# Patient Record
Sex: MALE | Race: WHITE | Employment: UNEMPLOYED | URBAN - METROPOLITAN AREA
[De-identification: names, ages, dates, MRNs, and addresses within clinical notes are randomized per-mention and may not be internally consistent; named-entity substitution may affect disease eponyms.]

---

## 2017-10-23 ENCOUNTER — HOSPITAL ENCOUNTER (OUTPATIENT)
Dept: OTHER | Age: 52
Discharge: HOME | End: 2017-10-23
Attending: INTERNAL MEDICINE | Admitting: INTERNAL MEDICINE

## 2017-10-23 DIAGNOSIS — R52 PAIN: ICD-10-CM

## 2019-04-04 ENCOUNTER — HOSPITAL ENCOUNTER (OUTPATIENT)
Dept: GENERAL RADIOLOGY | Age: 54
Discharge: HOME | End: 2019-04-04
Payer: COMMERCIAL

## 2019-04-04 ENCOUNTER — HOSPITAL ENCOUNTER (OUTPATIENT)
Age: 54
Discharge: HOME | End: 2019-04-04
Payer: COMMERCIAL

## 2019-04-04 DIAGNOSIS — R05.9 COUGH: ICD-10-CM

## 2019-04-04 PROCEDURE — 71046 X-RAY EXAM CHEST 2 VIEWS: CPT

## 2020-05-22 ENCOUNTER — HOSPITAL ENCOUNTER (OUTPATIENT)
Age: 55
Discharge: HOME | End: 2020-05-22
Payer: COMMERCIAL

## 2020-05-22 ENCOUNTER — HOSPITAL ENCOUNTER (OUTPATIENT)
Dept: GENERAL RADIOLOGY | Age: 55
Discharge: HOME | End: 2020-05-22
Payer: COMMERCIAL

## 2020-05-22 PROCEDURE — 72040 X-RAY EXAM NECK SPINE 2-3 VW: CPT

## 2020-05-22 PROCEDURE — 73030 X-RAY EXAM OF SHOULDER: CPT

## 2022-05-23 ENCOUNTER — HOSPITAL ENCOUNTER (OUTPATIENT)
Dept: GENERAL RADIOLOGY | Age: 57
Discharge: HOME | End: 2022-05-23
Payer: COMMERCIAL

## 2022-05-23 ENCOUNTER — HOSPITAL ENCOUNTER (OUTPATIENT)
Age: 57
Discharge: HOME | End: 2022-05-23
Payer: COMMERCIAL

## 2022-05-23 DIAGNOSIS — M54.50 LOW BACK PAIN, UNSPECIFIED BACK PAIN LATERALITY, UNSPECIFIED CHRONICITY, UNSPECIFIED WHETHER SCIATICA PRESENT: ICD-10-CM

## 2022-05-23 PROCEDURE — 72100 X-RAY EXAM L-S SPINE 2/3 VWS: CPT

## 2022-06-07 ENCOUNTER — HOSPITAL ENCOUNTER (OUTPATIENT)
Dept: CT IMAGING | Age: 57
Discharge: HOME | End: 2022-06-07
Payer: COMMERCIAL

## 2022-06-07 DIAGNOSIS — I71.40 ABDOMINAL AORTIC ANEURYSM WITHOUT RUPTURE: ICD-10-CM

## 2022-06-07 PROCEDURE — 6360000004 HC RX CONTRAST MEDICATION: Performed by: INTERNAL MEDICINE

## 2022-06-07 PROCEDURE — 74174 CTA ABD&PLVS W/CONTRAST: CPT

## 2022-06-07 RX ADMIN — IOPAMIDOL 75 ML: 755 INJECTION, SOLUTION INTRAVENOUS at 08:47

## 2022-07-25 PROBLEM — I65.29 CAROTID ARTERY STENOSIS: Status: ACTIVE | Noted: 2020-06-30

## 2022-07-25 PROBLEM — I71.43 ANEURYSM OF INFRARENAL ABDOMINAL AORTA (HCC): Status: ACTIVE | Noted: 2022-06-24

## 2022-07-25 PROBLEM — M19.019 PRIMARY LOCALIZED OSTEOARTHROSIS OF SHOULDER REGION: Status: ACTIVE | Noted: 2017-11-27

## 2022-07-25 PROBLEM — N52.9 MALE ERECTILE DISORDER: Status: ACTIVE | Noted: 2019-06-28

## 2022-07-25 RX ORDER — GEMFIBROZIL 600 MG/1
600 TABLET, FILM COATED ORAL
COMMUNITY

## 2022-07-25 RX ORDER — TAMSULOSIN HYDROCHLORIDE 0.4 MG/1
0.4 CAPSULE ORAL DAILY
COMMUNITY

## 2022-07-25 RX ORDER — METRONIDAZOLE 250 MG/1
250 TABLET ORAL 3 TIMES DAILY
COMMUNITY

## 2022-07-25 RX ORDER — LISINOPRIL AND HYDROCHLOROTHIAZIDE 20; 12.5 MG/1; MG/1
TABLET ORAL
COMMUNITY
Start: 2022-05-02

## 2022-07-25 RX ORDER — NAPROXEN 500 MG/1
500 TABLET ORAL 2 TIMES DAILY WITH MEALS
COMMUNITY

## 2022-07-25 RX ORDER — AMLODIPINE BESYLATE 10 MG/1
TABLET ORAL
COMMUNITY
Start: 2022-05-26

## 2022-07-25 RX ORDER — MIRTAZAPINE 45 MG/1
45 TABLET, ORALLY DISINTEGRATING ORAL NIGHTLY
COMMUNITY

## 2022-07-25 RX ORDER — TRAZODONE HYDROCHLORIDE 50 MG/1
50 TABLET ORAL NIGHTLY
COMMUNITY

## 2022-07-25 RX ORDER — CYCLOBENZAPRINE HCL 5 MG
TABLET ORAL
COMMUNITY
Start: 2022-04-22

## 2022-07-25 RX ORDER — ATORVASTATIN CALCIUM 40 MG/1
TABLET, FILM COATED ORAL
COMMUNITY
Start: 2022-06-24

## 2022-07-25 RX ORDER — SILDENAFIL 100 MG/1
100 TABLET, FILM COATED ORAL PRN
COMMUNITY

## 2022-07-25 RX ORDER — LEVOFLOXACIN 500 MG/1
500 TABLET, FILM COATED ORAL DAILY
COMMUNITY

## 2022-07-26 ENCOUNTER — OFFICE VISIT (OUTPATIENT)
Dept: VASCULAR SURGERY | Age: 57
End: 2022-07-26
Payer: COMMERCIAL

## 2022-07-26 VITALS
SYSTOLIC BLOOD PRESSURE: 126 MMHG | DIASTOLIC BLOOD PRESSURE: 78 MMHG | WEIGHT: 207 LBS | HEIGHT: 69 IN | BODY MASS INDEX: 30.66 KG/M2

## 2022-07-26 DIAGNOSIS — I71.40 AAA (ABDOMINAL AORTIC ANEURYSM) WITHOUT RUPTURE: Primary | ICD-10-CM

## 2022-07-26 PROCEDURE — G8427 DOCREV CUR MEDS BY ELIG CLIN: HCPCS | Performed by: SURGERY

## 2022-07-26 PROCEDURE — G8417 CALC BMI ABV UP PARAM F/U: HCPCS | Performed by: SURGERY

## 2022-07-26 PROCEDURE — 4004F PT TOBACCO SCREEN RCVD TLK: CPT | Performed by: SURGERY

## 2022-07-26 PROCEDURE — 99203 OFFICE O/P NEW LOW 30 MIN: CPT | Performed by: SURGERY

## 2022-07-26 PROCEDURE — 3017F COLORECTAL CA SCREEN DOC REV: CPT | Performed by: SURGERY

## 2022-07-26 NOTE — PROGRESS NOTES
[facility]  Consultation Note    Chief Complaint / Reason for Consultation  AAA    History of Present Illness  Patient is a 60 y.o. male with history of hypertension, COPD presents today in referral for an infrarenal abdominal aortic aneurysm. Patient experienced some low back pain and x-ray was performed suggesting a infrarenal abdominal aortic aneurysm. Subsequent CT scan was performed showing a 4.9 cm aneurysm. As a result he is referred to vascular surgery for further evaluation. He denies any family history of aneurysmal disease. The patient does currently smoke and has a long history of tobacco abuse    Review of Systems     Denies fevers, chills, chest pain, shortness of breath, nausea, vomiting, hematemesis, diarrhea, constipation, melena, hematochezia, wt changes, vision problems, blindness, hearing problems, facial droop, slurred speech, extremity weakness, extremity numbness, dysuria. Past Medical History:   Diagnosis Date    Anxiety     [facility] DR EWING [phone], PANIC DISORDER    Bulging disc     LUMBAR    COPD (chronic obstructive pulmonary disease) ([facility])     EMPHYSEMA    Dependent personality disorder     PER PSYCH. HISTORY    Enlarged prostate     Hypertension     Leukocytosis     MACROCYTOSIS    MDD (major depressive disorder)     discharged from [facility] psych unit 10-5-12.     Neuropathy     Weight loss 11-6-12    30 LBS IN LAST YEAR R/T DEPRESSION       Past Surgical History:   Procedure Laterality Date    COLONOSCOPY      CYSTOSCOPY  2/27/2014    HERNIA REPAIR      BILAT GROIN    INGUINAL HERNIA REPAIR  11/14/12    REPAIR RECURRENT WITH MESH    UMBILICAL HERNIA REPAIR  3/7/14    with mesh    UPPER GASTROINTESTINAL ENDOSCOPY         Allergies   Allergen Reactions    Nsaids      IBS    Prednisone      Does not remember    Pcn [Penicillins] Rash       Social History     Socioeconomic History    Marital status: Single     Spouse name: Not on file  06/10/2021 10:17 AM    K 4.3 06/10/2021 10:17 AM     06/10/2021 10:17 AM    CO2 22 06/10/2021 10:17 AM    BUN 19 06/10/2021 10:17 AM    CREATININE 1.1 06/10/2021 10:17 AM      No results found for: GLU    Imaging:     CT scan reviewed and shows a 4.8 cm infrarenal aortic aneurysm with atherosclerosis into the iliac arteries with stenosis of the right common iliac artery    Assessment:   Asymptomatic 4.9 cm infrarenal aortic aneurysm  Tobacco abuse      Plan:  1. AAA (abdominal aortic aneurysm) without rupture (HCC)  4.9 cm asymptomatic infrarenal abdominal aortic aneurysm. Discussed with him and his sister the natural history of aneurysmal disease. At this point would recommend routine surveillance imaging with repeat CT scan in 6 months. Discussed with him the importance of smoking cessation. All questions answered. - CTA ABDOMEN PELVIS W WO CONTRAST; Future          Salomón Tripathi M.D., FACS.  7/26/2022  7:59 AM

## 2022-07-26 NOTE — LETTER
[facility]) - Vascular and Endovascular Surgeons  [facility] [address] [place] [address]  Phone: [phone]  Fax: [phone]           Michael Guevara MD      July 26, 2022     Patient: Kasey Ybarra   MR Number: [id number]   YOB: 1965   Date of Visit: 7/26/2022       Dear Dr. Stefano Webber:    Thank you for referring Lionel Worthy to me for evaluation/treatment. Below are the relevant portions of my assessment and plan of care. If you have questions, please do not hesitate to call me. I look forward to following Laura House along with you.     Sincerely,        Michael Guevara MD    CC providers:  Felton Cagle MD  [address], [facility] [address] [address]

## 2023-01-27 ENCOUNTER — HOSPITAL ENCOUNTER (OUTPATIENT)
Dept: CT IMAGING | Age: 58
Discharge: HOME | End: 2023-01-27
Payer: COMMERCIAL

## 2023-01-27 DIAGNOSIS — I71.40 AAA (ABDOMINAL AORTIC ANEURYSM) WITHOUT RUPTURE: ICD-10-CM

## 2023-01-27 LAB
CREAT SERPL-MCNC: 0.9 MG/DL (ref 0.9–1.3)
GFR SERPL CREATININE-BSD FRML MDRD: >60 ML/MIN/{1.73_M2}

## 2023-01-27 PROCEDURE — 36415 COLL VENOUS BLD VENIPUNCTURE: CPT

## 2023-01-27 PROCEDURE — 82565 ASSAY OF CREATININE: CPT

## 2023-01-27 PROCEDURE — 74174 CTA ABD&PLVS W/CONTRAST: CPT

## 2023-01-27 PROCEDURE — 6360000004 HC RX CONTRAST MEDICATION: Performed by: SURGERY

## 2023-01-27 RX ADMIN — IOPAMIDOL 75 ML: 755 INJECTION, SOLUTION INTRAVENOUS at 09:18

## 2023-01-30 ENCOUNTER — OFFICE VISIT (OUTPATIENT)
Dept: VASCULAR SURGERY | Age: 58
End: 2023-01-30
Payer: COMMERCIAL

## 2023-01-30 VITALS
HEIGHT: 69 IN | DIASTOLIC BLOOD PRESSURE: 82 MMHG | WEIGHT: 200 LBS | SYSTOLIC BLOOD PRESSURE: 138 MMHG | BODY MASS INDEX: 29.62 KG/M2

## 2023-01-30 DIAGNOSIS — I71.43 INFRARENAL ABDOMINAL AORTIC ANEURYSM (AAA) WITHOUT RUPTURE: Primary | ICD-10-CM

## 2023-01-30 PROCEDURE — G8427 DOCREV CUR MEDS BY ELIG CLIN: HCPCS | Performed by: SURGERY

## 2023-01-30 PROCEDURE — 3017F COLORECTAL CA SCREEN DOC REV: CPT | Performed by: SURGERY

## 2023-01-30 PROCEDURE — 4004F PT TOBACCO SCREEN RCVD TLK: CPT | Performed by: SURGERY

## 2023-01-30 PROCEDURE — 3075F SYST BP GE 130 - 139MM HG: CPT | Performed by: SURGERY

## 2023-01-30 PROCEDURE — 3079F DIAST BP 80-89 MM HG: CPT | Performed by: SURGERY

## 2023-01-30 PROCEDURE — G8417 CALC BMI ABV UP PARAM F/U: HCPCS | Performed by: SURGERY

## 2023-01-30 PROCEDURE — 99213 OFFICE O/P EST LOW 20 MIN: CPT | Performed by: SURGERY

## 2023-01-30 PROCEDURE — G8484 FLU IMMUNIZE NO ADMIN: HCPCS | Performed by: SURGERY

## 2023-01-30 NOTE — PROGRESS NOTES
[facility])   Vascular Surgery Followup    Referring Provider:  Ulisses Patino MD     Chief Complaint   Patient presents with    Follow-up        History of Present Illness:  61-year-old male here today for follow-up after undergoing CT scan surveillance of an infrarenal abdominal aortic aneurysm. He denies any complaints. Denies abdominal or back pain    Past Medical History:   has a past medical history of Anxiety, Bulging disc, COPD (chronic obstructive pulmonary disease) ([address] Dependent personality disorder ([address] Enlarged prostate, Hypertension, Leukocytosis, MDD (major depressive disorder), Neuropathy, and Weight loss. Surgical History:   has a past surgical history that includes hernia repair; Colonoscopy; Upper gastrointestinal endoscopy; Inguinal hernia repair (11/14/12); Umbilical hernia repair (3/7/14); and Cystocopy (2/27/2014). Social History:   reports that he has been smoking cigarettes. He has a 15.00 pack-year smoking history. He has never used smokeless tobacco. He reports current alcohol use of about 2.0 standard drinks per week. He reports that he does not use drugs. Family History:  family history includes COPD in his father and mother; Cancer in his father; Dementia in his mother; Heart Disease in his father; High Blood Pressure in his father.      Home Medications:  Current Outpatient Medications   Medication Sig Dispense Refill    amLODIPine (NORVASC) 10 MG tablet TAKE 1 TABLET BY MOUTH EVERY DAY      cyclobenzaprine (FLEXERIL) 5 MG tablet TAKE 1 TABLET BY MOUTH THREE TIMES A DAY      atorvastatin (LIPITOR) 40 MG tablet Atorvastatin Calcium 40 MG Oral Tablet QTY: 90 tablet Days: 90 Refills: 0  Written: 06/24/22 Patient Instructions: TAKE 1 TABLET BY MOUTH EVERY DAY      lisinopril-hydroCHLOROthiazide (PRINZIDE;ZESTORETIC) 20-12.5 MG per tablet TAKE 1 TABLET BY MOUTH EVERY DAY      metroNIDAZOLE (FLAGYL) 250 MG tablet Take 250 mg by mouth in the morning and 250 mg at noon and 250 mg before bedtime. gemfibrozil (LOPID) 600 MG tablet Take 600 mg by mouth in the morning and 600 mg in the evening. Take before meals. HYDROCODONE-ACETAMINOPHEN 5-500 MG PO TABS, STARTER PACK, Take by mouth.      levoFLOXacin (LEVAQUIN) 500 MG tablet Take 500 mg by mouth in the morning. naproxen (NAPROSYN) 500 MG tablet Take 500 mg by mouth in the morning and 500 mg in the evening. Take with meals. mirtazapine (REMERON SOLTAB) 45 MG disintegrating tablet Take 45 mg by mouth nightly      tamsulosin (FLOMAX) 0.4 MG capsule Take 0.4 mg by mouth in the morning. traZODone (DESYREL) 50 MG tablet Take 50 mg by mouth nightly      sildenafil (VIAGRA) 100 MG tablet Take 100 mg by mouth as needed for Erectile Dysfunction      cyclobenzaprine (FLEXERIL) 10 MG tablet Take 1 tablet by mouth 3 times daily as needed for Muscle spasms 15 tablet 0    Cholecalciferol (VITAMIN D3) 5000 UNITS TABS Take  by mouth daily. amLODIPine (NORVASC) 5 MG tablet Take 5 mg by mouth daily. albuterol (PROVENTIL HFA;VENTOLIN HFA) 108 (90 BASE) MCG/ACT inhaler Inhale 2 puffs into the lungs every 6 hours as needed. clonazePAM (KLONOPIN) 1 MG tablet Take 1 mg by mouth 3 times daily. No current facility-administered medications for this visit. Allergies:  Nsaids, Prednisone, and Pcn [penicillins]     Review of Systems:   Constitutional: there has been no unanticipated weight loss. There's been no change in energy level, sleep pattern, or activity level. Eyes: No visual changes or diplopia. No scleral icterus. ENT: No Headaches, hearing loss or vertigo. No mouth sores or sore throat. Cardiovascular: Reviewed in HPI  Respiratory: No cough or wheezing, no sputum production. No hematemesis. Gastrointestinal: No abdominal pain, appetite loss, blood in stools. No change in bowel or bladder habits. Genitourinary: No dysuria, trouble voiding, or hematuria.   Musculoskeletal:  No gait disturbance, weakness or joint complaints. Integumentary: No rash or pruritis. Neurological: No headache, diplopia, change in muscle strength, numbness or tingling. No change in gait, balance, coordination, mood, affect, memory, mentation, behavior. Psychiatric: No anxiety, no depression. Endocrine: No malaise, fatigue or temperature intolerance. No excessive thirst, fluid intake, or urination. No tremor. Hematologic/Lymphatic: No abnormal bruising or bleeding, blood clots or swollen lymph nodes. Allergic/Immunologic: No nasal congestion or hives. Physical Examination:    Vitals:    01/30/23 0902   BP: 138/82          General appearance: alert, appears stated age, cooperative, and no distress  Head: Normocephalic, without obvious abnormality, atraumatic  Neck: no adenopathy, no carotid bruit, no JVD, supple, symmetrical, trachea midline, and thyroid: not enlarged, symmetric, no tenderness/mass/nodules  Lungs: clear to auscultation bilaterally  Heart: regular rate and rhythm, S1, S2 normal, no murmur, click, rub or gallop  Abdomen: soft, non-tender. Bowel sounds normal. No masses,  no organomegaly  Extremities: extremities normal, atraumatic, no cyanosis or edema    Neurologic: Grossly normal    MEDICAL DECISION MAKING/TESTING  I have reviewed the testing personally and my interpretation is below. 1. Infrarenal abdominal aortic aneurysm with mild increase in size, currently  measuring 5.1 x 5.1 cm. Previously was 4.9 x 4.9 cm.  2. Unchanged, severe atherosclerotic changes throughout abdominal aorta with  intramural thrombus. 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 3. Unchanged, 70% stenosis at the origin of right common iliac artery. Mildly dilated right common iliac artery at 1.8 cm.  4. Unchanged nonobstructing calculus inferior pole left kidney. Simple  stable bilateral renal cysts. 5. Unchanged colonic diverticulosis.   6. Unchanged incompletely distended urinary bladder versus urinary bladder  wall prominence due to chronic obstructive uropathy from mildly enlarged  prostate.    Assessment:     Patient Active Problem List   Diagnosis    DDD (degenerative disc disease), cervical    Displacement of cervical intervertebral disc without myelopathy    Aneurysm of infrarenal abdominal aorta    Anxiety disorder    Benign essential hypertension    Benign prostatic hyperplasia with lower urinary tract symptoms    Carotid artery stenosis    Chronic obstructive pulmonary disease (HCC)    Current smoker    Degeneration of lumbar or lumbosacral intervertebral disc    Depression    Diverticular disease    Esophageal reflux    Male erectile disorder    Neck sprain    Nicotine dependence    Other and unspecified hyperlipidemia    Primary localized osteoarthrosis of shoulder region    Recurrent major depression-severe (HCC)    Suicidal ideation       Plan:  57-year-old male with a slight interval enlargement of an infrarenal abdominal aortic aneurysm now measuring 5 cm.  I discussed with him the possibility of repair versus continued close surveillance with repeat CT in 6 months.  He would like to proceed with repeat CT scan in 6 months.  I did discuss with him and his sister today that if the aneurysm grew again on the next CT scan would recommend repair.  All questions answered.    Thank you for allowing me to participate in the care of this individual.  Please do not hesitate to contact me with any questions.    Salomón Hinojosa M.D., FACS.  1/30/2023  9:08 AM

## 2023-01-30 NOTE — LETTER
[facility]) - Vascular and Endovascular Surgeons  [facility] [address] [place] [address]  Phone: [phone]  Fax: [phone]           Kannan Francis MD      January 30, 2023     Patient: Tracee Sher   MR Number: [id number]   YOB: 1965   Date of Visit: 1/30/2023       Dear Dr. Joo Cruz:    Thank you for referring Danial Walters to me for evaluation/treatment. Below are the relevant portions of my assessment and plan of care. If you have questions, please do not hesitate to call me. I look forward to following [place] Humbles along with you.     Sincerely,        Kannan Francis MD    CC providers:  Moses Em MD  [address] [place] [address] [address] [place] [address] [address]

## 2023-08-04 ENCOUNTER — HOSPITAL ENCOUNTER (OUTPATIENT)
Dept: CT IMAGING | Age: 58
Discharge: HOME | End: 2023-08-04
Payer: COMMERCIAL

## 2023-08-04 DIAGNOSIS — I71.43 INFRARENAL ABDOMINAL AORTIC ANEURYSM (AAA) WITHOUT RUPTURE (HCC): ICD-10-CM

## 2023-08-04 LAB
CREAT SERPL-MCNC: 1.2 MG/DL (ref 0.9–1.3)
GFR SERPLBLD CREATININE-BSD FMLA CKD-EPI: >60 ML/MIN/{1.73_M2}

## 2023-08-04 PROCEDURE — 36415 COLL VENOUS BLD VENIPUNCTURE: CPT

## 2023-08-04 PROCEDURE — 82565 ASSAY OF CREATININE: CPT

## 2023-08-04 PROCEDURE — 6360000004 HC RX CONTRAST MEDICATION: Performed by: SURGERY

## 2023-08-04 PROCEDURE — 74174 CTA ABD&PLVS W/CONTRAST: CPT

## 2023-08-04 RX ADMIN — IOHEXOL 75 ML: 350 INJECTION, SOLUTION INTRAVENOUS at 09:07

## 2023-08-05 NOTE — PROGRESS NOTES
TAKE 1 TABLET BY MOUTH EVERY DAY      lisinopril-hydroCHLOROthiazide (PRINZIDE;ZESTORETIC) 20-12.5 MG per tablet TAKE 1 TABLET BY MOUTH EVERY DAY      metroNIDAZOLE (FLAGYL) 250 MG tablet Take 250 mg by mouth in the morning and 250 mg at noon and 250 mg before bedtime. gemfibrozil (LOPID) 600 MG tablet Take 600 mg by mouth in the morning and 600 mg in the evening. Take before meals. HYDROCODONE-ACETAMINOPHEN 5-500 MG PO TABS, STARTER PACK, Take by mouth.      levoFLOXacin (LEVAQUIN) 500 MG tablet Take 500 mg by mouth in the morning. naproxen (NAPROSYN) 500 MG tablet Take 500 mg by mouth in the morning and 500 mg in the evening. Take with meals. mirtazapine (REMERON SOLTAB) 45 MG disintegrating tablet Take 45 mg by mouth nightly      tamsulosin (FLOMAX) 0.4 MG capsule Take 0.4 mg by mouth in the morning. traZODone (DESYREL) 50 MG tablet Take 50 mg by mouth nightly      sildenafil (VIAGRA) 100 MG tablet Take 100 mg by mouth as needed for Erectile Dysfunction      cyclobenzaprine (FLEXERIL) 10 MG tablet Take 1 tablet by mouth 3 times daily as needed for Muscle spasms 15 tablet 0    Cholecalciferol (VITAMIN D3) 5000 UNITS TABS Take  by mouth daily. amLODIPine (NORVASC) 5 MG tablet Take 5 mg by mouth daily. albuterol (PROVENTIL HFA;VENTOLIN HFA) 108 (90 BASE) MCG/ACT inhaler Inhale 2 puffs into the lungs every 6 hours as needed. clonazePAM (KLONOPIN) 1 MG tablet Take 1 mg by mouth 3 times daily. No current facility-administered medications for this visit. Allergies:  Nsaids, Prednisone, and Pcn [penicillins]     Review of Systems:   Constitutional: there has been no unanticipated weight loss. There's been no change in energy level, sleep pattern, or activity level. Eyes: No visual changes or diplopia. No scleral icterus. ENT: No Headaches, hearing loss or vertigo. No mouth sores or sore throat.   Cardiovascular: Reviewed in HPI  Respiratory: No cough or wheezing, no

## 2023-08-08 ENCOUNTER — OFFICE VISIT (OUTPATIENT)
Dept: VASCULAR SURGERY | Age: 58
End: 2023-08-08
Payer: COMMERCIAL

## 2023-08-08 VITALS
WEIGHT: 175 LBS | DIASTOLIC BLOOD PRESSURE: 84 MMHG | BODY MASS INDEX: 25.92 KG/M2 | HEIGHT: 69 IN | SYSTOLIC BLOOD PRESSURE: 124 MMHG

## 2023-08-08 DIAGNOSIS — I71.43 INFRARENAL ABDOMINAL AORTIC ANEURYSM (AAA) WITHOUT RUPTURE (HCC): Primary | ICD-10-CM

## 2023-08-08 PROCEDURE — 4004F PT TOBACCO SCREEN RCVD TLK: CPT | Performed by: SURGERY

## 2023-08-08 PROCEDURE — 99213 OFFICE O/P EST LOW 20 MIN: CPT | Performed by: SURGERY

## 2023-08-08 PROCEDURE — 3079F DIAST BP 80-89 MM HG: CPT | Performed by: SURGERY

## 2023-08-08 PROCEDURE — 3017F COLORECTAL CA SCREEN DOC REV: CPT | Performed by: SURGERY

## 2023-08-08 PROCEDURE — G8417 CALC BMI ABV UP PARAM F/U: HCPCS | Performed by: SURGERY

## 2023-08-08 PROCEDURE — 3074F SYST BP LT 130 MM HG: CPT | Performed by: SURGERY

## 2023-08-08 PROCEDURE — G8427 DOCREV CUR MEDS BY ELIG CLIN: HCPCS | Performed by: SURGERY

## 2023-08-09 ENCOUNTER — TELEPHONE (OUTPATIENT)
Dept: VASCULAR SURGERY | Age: 58
End: 2023-08-09

## 2023-08-11 ENCOUNTER — OFFICE VISIT (OUTPATIENT)
Dept: VASCULAR SURGERY | Age: 58
End: 2023-08-11
Payer: COMMERCIAL

## 2023-08-11 VITALS
SYSTOLIC BLOOD PRESSURE: 110 MMHG | BODY MASS INDEX: 25.7 KG/M2 | DIASTOLIC BLOOD PRESSURE: 60 MMHG | HEIGHT: 69 IN | WEIGHT: 173.5 LBS

## 2023-08-11 DIAGNOSIS — I72.4 POPLITEAL ARTERY ANEURYSM (HCC): ICD-10-CM

## 2023-08-11 DIAGNOSIS — I71.42 JUXTARENAL ABDOMINAL AORTIC ANEURYSM (AAA) WITHOUT RUPTURE (HCC): Primary | ICD-10-CM

## 2023-08-11 PROCEDURE — G8427 DOCREV CUR MEDS BY ELIG CLIN: HCPCS | Performed by: SURGERY

## 2023-08-11 PROCEDURE — 99214 OFFICE O/P EST MOD 30 MIN: CPT | Performed by: SURGERY

## 2023-08-11 PROCEDURE — 4004F PT TOBACCO SCREEN RCVD TLK: CPT | Performed by: SURGERY

## 2023-08-11 PROCEDURE — 3017F COLORECTAL CA SCREEN DOC REV: CPT | Performed by: SURGERY

## 2023-08-11 PROCEDURE — 3074F SYST BP LT 130 MM HG: CPT | Performed by: SURGERY

## 2023-08-11 PROCEDURE — 3078F DIAST BP <80 MM HG: CPT | Performed by: SURGERY

## 2023-08-11 PROCEDURE — G8417 CALC BMI ABV UP PARAM F/U: HCPCS | Performed by: SURGERY

## 2023-08-12 NOTE — PROGRESS NOTES
Outpatient Consultation / H&P    Date of Consultation:  8/11/2023    PCP:  Sharilyn Koyanagi, MD     Referring Provider:  Dr. Debbie Hall     Chief Complaint:   Chief Complaint   Patient presents with    Other     Patient ref by Dr Debbie Hall for AAA. Pamlr        History of Present Illness: We are asked to see this patient in consultation by Dr. Debbie Hall regarding an AAA. Roshni Montaño is a 59 y.o. male who has been followed with an AAA. Diameter was 4.9cm in June of 2022. Recent CTA showed increase in size to 5.1 by Radiology read. Patient quite nervous about aneurysm and desires repair. Patient also notes left posterior knee pain and mass. Past Medical History:  Past Medical History:   Diagnosis Date    Anxiety     [facility] DR Galina Mauricio [phone], PANIC DISORDER    Bulging disc     LUMBAR    COPD (chronic obstructive pulmonary disease) (HCC)     EMPHYSEMA    Dependent personality disorder (HCC)     PER PSYCH. HISTORY    Enlarged prostate     Hypertension     Leukocytosis     MACROCYTOSIS    MDD (major depressive disorder)     discharged from [facility]. [facility] psych unit 10-5-12. Neuropathy     Weight loss 11-6-12    30 LBS IN LAST YEAR R/T DEPRESSION       Past Surgical History:  Past Surgical History:   Procedure Laterality Date    COLONOSCOPY      CYSTOSCOPY  2/27/2014    HERNIA REPAIR      BILAT GROIN    INGUINAL HERNIA REPAIR  11/14/12    REPAIR RECURRENT WITH MESH    UMBILICAL HERNIA REPAIR  3/7/14    with mesh    UPPER GASTROINTESTINAL ENDOSCOPY         Home Medications:   Prior to Admission medications    Medication Sig Start Date End Date Taking?  Authorizing Provider   amLODIPine (NORVASC) 10 MG tablet  5/26/22  Yes Historical Provider, MD   cyclobenzaprine (FLEXERIL) 5 MG tablet  4/22/22  Yes Historical Provider, MD   atorvastatin (LIPITOR) 40 MG tablet Atorvastatin Calcium 40 MG Oral Tablet QTY: 90 tablet Days: 90 Refills: 0  Written: 06/24/22 Patient Instructions: TAKE 1 TABLET BY MOUTH

## 2023-08-14 DIAGNOSIS — Z01.818 PREOP TESTING: ICD-10-CM

## 2023-08-14 DIAGNOSIS — I71.43 INFRARENAL ABDOMINAL AORTIC ANEURYSM (AAA) WITHOUT RUPTURE (HCC): Primary | ICD-10-CM

## 2023-08-23 ENCOUNTER — HOSPITAL ENCOUNTER (OUTPATIENT)
Dept: NON INVASIVE DIAGNOSTICS | Age: 58
Discharge: HOME | End: 2023-08-23
Payer: COMMERCIAL

## 2023-08-23 DIAGNOSIS — I71.43 INFRARENAL ABDOMINAL AORTIC ANEURYSM (AAA) WITHOUT RUPTURE (HCC): ICD-10-CM

## 2023-08-23 DIAGNOSIS — Z01.818 PREOP TESTING: ICD-10-CM

## 2023-08-23 LAB
LV EF: 46 %
LVEF MODALITY: NORMAL

## 2023-08-23 PROCEDURE — 93017 CV STRESS TEST TRACING ONLY: CPT | Performed by: INTERNAL MEDICINE

## 2023-08-23 PROCEDURE — 6360000002 HC RX W HCPCS: Performed by: SURGERY

## 2023-08-23 PROCEDURE — 3430000000 HC RX DIAGNOSTIC RADIOPHARMACEUTICAL: Performed by: SURGERY

## 2023-08-23 PROCEDURE — A9502 TC99M TETROFOSMIN: HCPCS | Performed by: SURGERY

## 2023-08-23 PROCEDURE — 78452 HT MUSCLE IMAGE SPECT MULT: CPT | Performed by: INTERNAL MEDICINE

## 2023-08-23 RX ORDER — REGADENOSON 0.08 MG/ML
0.4 INJECTION, SOLUTION INTRAVENOUS
Status: COMPLETED | OUTPATIENT
Start: 2023-08-23 | End: 2023-08-23

## 2023-08-23 RX ADMIN — REGADENOSON 0.4 MG: 0.08 INJECTION, SOLUTION INTRAVENOUS at 08:49

## 2023-08-23 RX ADMIN — TETROFOSMIN 30 MILLICURIE: 1.38 INJECTION, POWDER, LYOPHILIZED, FOR SOLUTION INTRAVENOUS at 08:54

## 2023-08-23 RX ADMIN — TETROFOSMIN 10 MILLICURIE: 1.38 INJECTION, POWDER, LYOPHILIZED, FOR SOLUTION INTRAVENOUS at 07:54

## 2023-08-23 NOTE — PROGRESS NOTES
Instructed on Lexiscan Stress Test Procedure including possible side effects/ adverse reactions. Patient verbalizes  understanding and denies having any questions . See [id number] [address] [place] [address] Cardiology

## 2023-08-30 ENCOUNTER — TELEPHONE (OUTPATIENT)
Dept: VASCULAR SURGERY | Age: 58
End: 2023-08-30

## 2023-08-30 ENCOUNTER — HOSPITAL ENCOUNTER (OUTPATIENT)
Dept: CT IMAGING | Age: 58
Discharge: HOME | End: 2023-08-30
Attending: SURGERY
Payer: COMMERCIAL

## 2023-08-30 DIAGNOSIS — I72.4 POPLITEAL ARTERY ANEURYSM (HCC): ICD-10-CM

## 2023-08-30 PROCEDURE — 75635 CT ANGIO ABDOMINAL ARTERIES: CPT

## 2023-08-30 PROCEDURE — 6360000004 HC RX CONTRAST MEDICATION: Performed by: SURGERY

## 2023-08-30 RX ADMIN — IOPAMIDOL 100 ML: 755 INJECTION, SOLUTION INTRAVENOUS at 06:56

## 2023-08-30 NOTE — PROGRESS NOTES
[address]   Cardiovascular Evaluation    PATIENT: Andrew Rodriguez  DATE: 2023  MRN: [id number]  CSN: [id number]  : 1965    Primary Care Doctor/Referring provider: Guilherme Egan MD, No admitting provider for patient encounter. Reason for evaluation/Chief complaint:   Cardiac Clearance, Hypertension, Hyperlipidemia, Chest Pain, and Shortness of Breath      Subjective:    History of present illness on initial date of evaluation:   Andrew Rodriguez is a 59 y.o. male patient who presents as a new patient for cardiology evaluation and treatment for cardiac clearance for abdominal aorta aneurysm open repair with . He has a past medical history including hypertension, carotid artery stenosis, hyperlipidemia, tobacco abuse, and AAA. The patient had a abdominal CTA measuring 5.1 cm on 23. He had a Lexiscan stress myoview 23 fixed defect consistent with scar/prior infarction, EF 46%. Today he presents with his sister for visit. He reports he follows with PCP Guilherme Egan MD. He lives in [place], [place]. He reports he was initially having issues with right shoulder. Incidentally AAA was found on scan in relation to his shoulder. He reports he has lower back pain. 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 He states he has fatigue after 30 minutes on hot days and has to stop to rest with push mowing. He reports at times will experience lightheadedness with this activity. He reports he is a active smoker. He has lost 30 pounds in 6 months. He has sleep disturbance. He has issue with his anxiety. He reports he has tried various statin medications with muscle myalgias that are intolerable. He reports he was unable to attend a sleep study.      Patient Active Problem List   Diagnosis    DDD (degenerative disc disease), cervical    Displacement of cervical intervertebral disc without myelopathy    Aneurysm of infrarenal Pt discharged at this time in stable condition back to [facility] via [facility] ambulance. Pt alert and cooperative at this time. All belongings given to [facility] staff.

## 2023-08-30 NOTE — TELEPHONE ENCOUNTER
Called patient regarding coming in to see DR Jonah Farah at 8:45am tomorrow for cardiac clearance for his open AAA Repair. megan

## 2023-08-30 NOTE — TELEPHONE ENCOUNTER
Called patient and his sister as contact regarding apt for tomorrow with Dr Larry Arzola at 8:45am for cardiac clearance.  Neftali

## 2023-08-31 ENCOUNTER — OFFICE VISIT (OUTPATIENT)
Dept: CARDIOLOGY CLINIC | Age: 58
End: 2023-08-31

## 2023-08-31 ENCOUNTER — TELEPHONE (OUTPATIENT)
Dept: CARDIOLOGY CLINIC | Age: 58
End: 2023-08-31

## 2023-08-31 VITALS
OXYGEN SATURATION: 96 % | DIASTOLIC BLOOD PRESSURE: 68 MMHG | HEIGHT: 69 IN | SYSTOLIC BLOOD PRESSURE: 110 MMHG | BODY MASS INDEX: 25.77 KG/M2 | HEART RATE: 82 BPM | WEIGHT: 174 LBS

## 2023-08-31 DIAGNOSIS — I71.43 INFRARENAL ABDOMINAL AORTIC ANEURYSM (AAA) WITHOUT RUPTURE (HCC): ICD-10-CM

## 2023-08-31 DIAGNOSIS — F17.200 CURRENT SMOKER: ICD-10-CM

## 2023-08-31 DIAGNOSIS — Z01.818 PREOPERATIVE CLEARANCE: Primary | ICD-10-CM

## 2023-08-31 DIAGNOSIS — R06.02 SOB (SHORTNESS OF BREATH): ICD-10-CM

## 2023-08-31 DIAGNOSIS — E78.5 HYPERLIPIDEMIA, UNSPECIFIED HYPERLIPIDEMIA TYPE: ICD-10-CM

## 2023-08-31 DIAGNOSIS — R94.39 ABNORMAL STRESS TEST: ICD-10-CM

## 2023-08-31 DIAGNOSIS — I10 BENIGN ESSENTIAL HYPERTENSION: ICD-10-CM

## 2023-08-31 PROBLEM — Z01.810 ENCOUNTER FOR PRE-OPERATIVE CARDIOVASCULAR CLEARANCE: Status: ACTIVE | Noted: 2023-08-31

## 2023-08-31 PROBLEM — R53.83 FATIGUE: Status: ACTIVE | Noted: 2023-08-31

## 2023-08-31 PROBLEM — R42 DIZZINESS: Status: ACTIVE | Noted: 2023-08-31

## 2023-08-31 PROBLEM — R63.4 WEIGHT LOSS, ABNORMAL: Status: ACTIVE | Noted: 2023-08-31

## 2023-08-31 NOTE — PATIENT INSTRUCTIONS
Your provider has ordered testing for further evaluation. An order/prescription has been included in your paper work. To schedule outpatient testing, contact Central Scheduling by calling [facility] ([phone]). Plan:  1. EKG reviewed   2. Tobacco Cessation ~ high risk heart attack or stroke  ~1-800-QUIT-NOW  3. Order echocardiogram ~ dizziness, fatigue, shortness of breath   ~A test that records movement of your heart valves and chambers by ultrasound. Evaluates heart valves, any chamber enlargement, abnormal openings, or any fluid in the sac surrounding the heart. 4. Discussed will need medications management will discuss at future encounter ~ history of statin intolerance   5. Recommend follow up with primary care provider Javad Giordano MD ~ anxiety, depression, and sleep disturbance   6. Discussed ordering left heart catheterization possible percutaneous coronary intervention ~ abnormal stress test  ~ NPO (nothing to eat or drink) after midnight  prior to procedure  ~ Hold lisinopril-hydrochlorothiazide morning of procedure  ~ May take all other prescribed medications with sip of water morning of procedure  ~Pack over night bag  ~ Have transportation arrangements  ~ No lotion or cream to skin morning of procedure  ~ We will call you to schedule heart cath   7. Follow up after procedure.

## 2023-08-31 NOTE — TELEPHONE ENCOUNTER
Per  await to schedule ~ needs to discuss with .      Order left heart catheterization possible percutaneous coronary intervention ~ abnormal stress test  ~ NPO (nothing to eat or drink) after midnight  prior to procedure  ~ Hold lisinopril-hydrochlorothiazide morning of procedure  ~ May take all other prescribed medications with sip of water morning of procedure  ~Pack over night bag  ~ Have transportation arrangements  ~ No lotion or cream to skin morning of procedure

## 2023-09-11 NOTE — TELEPHONE ENCOUNTER
This patient was going to see his PCP and decide if he wanted to proceed. I talked to Dr Isa Vicente and vascular stuff will be on hold until after cardiac testing was completed. Joni Eisenberg, can you follow-up with patient to se if he has decided to proceed with cardiac testing.

## 2023-09-12 NOTE — TELEPHONE ENCOUNTER
Attempted to reach patient, again. Not able to contact. Per [facility] patient needs cardiac testing prior to scheduling cath it appears, echo is scheduled 09/26/23 it appears.

## 2023-09-28 ENCOUNTER — TELEPHONE (OUTPATIENT)
Dept: CARDIOLOGY CLINIC | Age: 58
End: 2023-09-28

## 2023-09-28 ENCOUNTER — HOSPITAL ENCOUNTER (OUTPATIENT)
Dept: NON INVASIVE DIAGNOSTICS | Age: 58
Discharge: HOME | End: 2023-09-28
Attending: INTERNAL MEDICINE
Payer: COMMERCIAL

## 2023-09-28 DIAGNOSIS — R06.02 SOB (SHORTNESS OF BREATH): ICD-10-CM

## 2023-09-28 DIAGNOSIS — I10 BENIGN ESSENTIAL HYPERTENSION: ICD-10-CM

## 2023-09-28 PROCEDURE — 93306 TTE W/DOPPLER COMPLETE: CPT

## 2023-09-28 NOTE — TELEPHONE ENCOUNTER
Pt called mhi wanting to know if vsp wants to proceed with the cath after normal echo results? Please advise.

## 2023-09-28 NOTE — TELEPHONE ENCOUNTER
----- Message from Abiel Joseph MD sent at 9/28/2023 12:40 PM EDT -----  The test is normal. Please call the patient and inform them of the normal result.

## 2023-09-30 PROBLEM — Z01.810 ENCOUNTER FOR PRE-OPERATIVE CARDIOVASCULAR CLEARANCE: Status: RESOLVED | Noted: 2023-08-31 | Resolved: 2023-09-30

## 2023-10-02 NOTE — TELEPHONE ENCOUNTER
Procedure:  [facility]  Doctor:  Dr. Ralf Dumont  Date:  10/18/23  Time:  9am  Arrival:  7:30am  Reps:  n/a  Anesthesia:  n/a      Spoke with patient. Please have patient arrive to the main entrance of [facility] ([address] [facility], [facility]) and check in with the registration desk. They will be directed to the Cath Lab. Medications reviewed per RN message. Remind patient to be NPO after midnight (8 hours prior). Do not apply lotions/creams on skin the day of procedure. [facility] - fyi only.

## 2023-10-02 NOTE — TELEPHONE ENCOUNTER
Pt returned call. He will not be able to answer his phone and would like for a vm to be left he doesn't answer. Pt would like to schedule for Kia Negro if possible.

## 2023-10-18 ENCOUNTER — HOSPITAL ENCOUNTER (OUTPATIENT)
Dept: CARDIAC CATH/INVASIVE PROCEDURES | Age: 58
Discharge: HOME | End: 2023-10-18
Attending: INTERNAL MEDICINE | Admitting: INTERNAL MEDICINE
Payer: COMMERCIAL

## 2023-10-18 VITALS
DIASTOLIC BLOOD PRESSURE: 86 MMHG | HEART RATE: 73 BPM | HEIGHT: 69 IN | BODY MASS INDEX: 25.62 KG/M2 | SYSTOLIC BLOOD PRESSURE: 137 MMHG | WEIGHT: 173 LBS | RESPIRATION RATE: 22 BRPM | OXYGEN SATURATION: 97 %

## 2023-10-18 LAB
ANION GAP SERPL CALCULATED.3IONS-SCNC: 12 MMOL/L (ref 3–16)
BUN SERPL-MCNC: 25 MG/DL (ref 7–20)
CALCIUM SERPL-MCNC: 9.4 MG/DL (ref 8.3–10.6)
CHLORIDE SERPL-SCNC: 97 MMOL/L (ref 99–110)
CHOLEST SERPL-MCNC: 216 MG/DL (ref 0–199)
CO2 SERPL-SCNC: 25 MMOL/L (ref 21–32)
CREAT SERPL-MCNC: 1.3 MG/DL (ref 0.9–1.3)
DEPRECATED RDW RBC AUTO: 13.5 % (ref 12.4–15.4)
EKG ATRIAL RATE: 74 BPM
EKG ATRIAL RATE: 80 BPM
EKG DIAGNOSIS: NORMAL
EKG DIAGNOSIS: NORMAL
EKG P AXIS: 51 DEGREES
EKG P AXIS: 62 DEGREES
EKG P-R INTERVAL: 158 MS
EKG P-R INTERVAL: 172 MS
EKG Q-T INTERVAL: 358 MS
EKG Q-T INTERVAL: 452 MS
EKG QRS DURATION: 148 MS
EKG QRS DURATION: 80 MS
EKG QTC CALCULATION (BAZETT): 412 MS
EKG QTC CALCULATION (BAZETT): 501 MS
EKG R AXIS: 15 DEGREES
EKG R AXIS: 30 DEGREES
EKG T AXIS: -35 DEGREES
EKG T AXIS: 12 DEGREES
EKG VENTRICULAR RATE: 74 BPM
EKG VENTRICULAR RATE: 80 BPM
GFR SERPLBLD CREATININE-BSD FMLA CKD-EPI: >60 ML/MIN/{1.73_M2}
GLUCOSE SERPL-MCNC: 114 MG/DL (ref 70–99)
HCT VFR BLD AUTO: 45.4 % (ref 40.5–52.5)
HDLC SERPL-MCNC: 32 MG/DL (ref 40–60)
HGB BLD-MCNC: 15.6 G/DL (ref 13.5–17.5)
LDLC SERPL CALC-MCNC: 146 MG/DL
MCH RBC QN AUTO: 34 PG (ref 26–34)
MCHC RBC AUTO-ENTMCNC: 34.5 G/DL (ref 31–36)
MCV RBC AUTO: 98.7 FL (ref 80–100)
PLATELET # BLD AUTO: 267 K/UL (ref 135–450)
PMV BLD AUTO: 7.3 FL (ref 5–10.5)
POTASSIUM SERPL-SCNC: 4.3 MMOL/L (ref 3.5–5.1)
RBC # BLD AUTO: 4.6 M/UL (ref 4.2–5.9)
SODIUM SERPL-SCNC: 134 MMOL/L (ref 136–145)
TRIGL SERPL-MCNC: 189 MG/DL (ref 0–150)
VLDLC SERPL CALC-MCNC: 38 MG/DL
WBC # BLD AUTO: 7.8 K/UL (ref 4–11)

## 2023-10-18 PROCEDURE — 2580000003 HC RX 258

## 2023-10-18 PROCEDURE — 99152 MOD SED SAME PHYS/QHP 5/>YRS: CPT | Performed by: INTERNAL MEDICINE

## 2023-10-18 PROCEDURE — 6360000002 HC RX W HCPCS: Performed by: INTERNAL MEDICINE

## 2023-10-18 PROCEDURE — C1887 CATHETER, GUIDING: HCPCS | Performed by: INTERNAL MEDICINE

## 2023-10-18 PROCEDURE — 92928 PRQ TCAT PLMT NTRAC ST 1 LES: CPT

## 2023-10-18 PROCEDURE — C1753 CATH, INTRAVAS ULTRASOUND: HCPCS | Performed by: INTERNAL MEDICINE

## 2023-10-18 PROCEDURE — 92978 ENDOLUMINL IVUS OCT C 1ST: CPT

## 2023-10-18 PROCEDURE — C1894 INTRO/SHEATH, NON-LASER: HCPCS | Performed by: INTERNAL MEDICINE

## 2023-10-18 PROCEDURE — 93458 L HRT ARTERY/VENTRICLE ANGIO: CPT

## 2023-10-18 PROCEDURE — 92928 PRQ TCAT PLMT NTRAC ST 1 LES: CPT | Performed by: INTERNAL MEDICINE

## 2023-10-18 PROCEDURE — 80061 LIPID PANEL: CPT

## 2023-10-18 PROCEDURE — 80048 BASIC METABOLIC PNL TOTAL CA: CPT

## 2023-10-18 PROCEDURE — 2709999900 HC NON-CHARGEABLE SUPPLY: Performed by: INTERNAL MEDICINE

## 2023-10-18 PROCEDURE — 2580000003 HC RX 258: Performed by: INTERNAL MEDICINE

## 2023-10-18 PROCEDURE — C1769 GUIDE WIRE: HCPCS | Performed by: INTERNAL MEDICINE

## 2023-10-18 PROCEDURE — 2500000003 HC RX 250 WO HCPCS

## 2023-10-18 PROCEDURE — 6370000000 HC RX 637 (ALT 250 FOR IP)

## 2023-10-18 PROCEDURE — 6360000002 HC RX W HCPCS

## 2023-10-18 PROCEDURE — 6370000000 HC RX 637 (ALT 250 FOR IP): Performed by: INTERNAL MEDICINE

## 2023-10-18 PROCEDURE — 85027 COMPLETE CBC AUTOMATED: CPT

## 2023-10-18 PROCEDURE — C1874 STENT, COATED/COV W/DEL SYS: HCPCS | Performed by: INTERNAL MEDICINE

## 2023-10-18 PROCEDURE — 93005 ELECTROCARDIOGRAM TRACING: CPT | Performed by: INTERNAL MEDICINE

## 2023-10-18 PROCEDURE — 92978 ENDOLUMINL IVUS OCT C 1ST: CPT | Performed by: INTERNAL MEDICINE

## 2023-10-18 PROCEDURE — 93458 L HRT ARTERY/VENTRICLE ANGIO: CPT | Performed by: INTERNAL MEDICINE

## 2023-10-18 PROCEDURE — C1725 CATH, TRANSLUMIN NON-LASER: HCPCS | Performed by: INTERNAL MEDICINE

## 2023-10-18 RX ORDER — SODIUM CHLORIDE 0.9 % (FLUSH) 0.9 %
5-40 SYRINGE (ML) INJECTION PRN
Status: DISCONTINUED | OUTPATIENT
Start: 2023-10-18 | End: 2023-10-18 | Stop reason: HOSPADM

## 2023-10-18 RX ORDER — SODIUM CHLORIDE 0.9 % (FLUSH) 0.9 %
5-40 SYRINGE (ML) INJECTION EVERY 12 HOURS SCHEDULED
Status: DISCONTINUED | OUTPATIENT
Start: 2023-10-18 | End: 2023-10-18 | Stop reason: HOSPADM

## 2023-10-18 RX ORDER — MIDAZOLAM HYDROCHLORIDE 1 MG/ML
INJECTION INTRAMUSCULAR; INTRAVENOUS
Status: COMPLETED | OUTPATIENT
Start: 2023-10-18 | End: 2023-10-18

## 2023-10-18 RX ORDER — ONDANSETRON 2 MG/ML
4 INJECTION INTRAMUSCULAR; INTRAVENOUS EVERY 6 HOURS PRN
Status: DISCONTINUED | OUTPATIENT
Start: 2023-10-18 | End: 2023-10-18 | Stop reason: SDUPTHER

## 2023-10-18 RX ORDER — SODIUM CHLORIDE 9 MG/ML
INJECTION, SOLUTION INTRAVENOUS PRN
Status: DISCONTINUED | OUTPATIENT
Start: 2023-10-18 | End: 2023-10-18 | Stop reason: HOSPADM

## 2023-10-18 RX ORDER — ATORVASTATIN CALCIUM 40 MG/1
40 TABLET, FILM COATED ORAL DAILY
Qty: 90 TABLET | Refills: 1 | Status: SHIPPED | OUTPATIENT
Start: 2023-10-18

## 2023-10-18 RX ORDER — LORAZEPAM 0.5 MG/1
0.5 TABLET ORAL
Status: DISCONTINUED | OUTPATIENT
Start: 2023-10-18 | End: 2023-10-18 | Stop reason: HOSPADM

## 2023-10-18 RX ORDER — CLOPIDOGREL 300 MG/1
TABLET, FILM COATED ORAL
Status: COMPLETED | OUTPATIENT
Start: 2023-10-18 | End: 2023-10-18

## 2023-10-18 RX ORDER — CARVEDILOL 3.12 MG/1
3.12 TABLET ORAL 2 TIMES DAILY
Qty: 180 TABLET | Refills: 1 | Status: SHIPPED | OUTPATIENT
Start: 2023-10-18

## 2023-10-18 RX ORDER — ONDANSETRON 2 MG/ML
4 INJECTION INTRAMUSCULAR; INTRAVENOUS EVERY 6 HOURS PRN
Status: DISCONTINUED | OUTPATIENT
Start: 2023-10-18 | End: 2023-10-18 | Stop reason: HOSPADM

## 2023-10-18 RX ORDER — CLOPIDOGREL BISULFATE 75 MG/1
75 TABLET ORAL DAILY
Qty: 30 TABLET | Refills: 3 | Status: SHIPPED | OUTPATIENT
Start: 2023-10-18

## 2023-10-18 RX ORDER — ATORVASTATIN CALCIUM 40 MG/1
40 TABLET, FILM COATED ORAL NIGHTLY
Status: COMPLETED | OUTPATIENT
Start: 2023-10-18 | End: 2023-10-18

## 2023-10-18 RX ORDER — ACETAMINOPHEN 325 MG/1
650 TABLET ORAL EVERY 4 HOURS PRN
Status: DISCONTINUED | OUTPATIENT
Start: 2023-10-18 | End: 2023-10-18 | Stop reason: HOSPADM

## 2023-10-18 RX ORDER — ASPIRIN 325 MG
325 TABLET ORAL ONCE
Status: COMPLETED | OUTPATIENT
Start: 2023-10-18 | End: 2023-10-18

## 2023-10-18 RX ORDER — SODIUM CHLORIDE 9 MG/ML
INJECTION, SOLUTION INTRAVENOUS CONTINUOUS
Status: ACTIVE | OUTPATIENT
Start: 2023-10-18 | End: 2023-10-18

## 2023-10-18 RX ORDER — FENTANYL CITRATE 50 UG/ML
INJECTION, SOLUTION INTRAMUSCULAR; INTRAVENOUS
Status: COMPLETED | OUTPATIENT
Start: 2023-10-18 | End: 2023-10-18

## 2023-10-18 RX ORDER — ASPIRIN 81 MG/1
81 TABLET ORAL DAILY
Qty: 90 TABLET | Refills: 1 | Status: SHIPPED | OUTPATIENT
Start: 2023-10-18

## 2023-10-18 RX ADMIN — Medication 325 MG: at 08:25

## 2023-10-18 RX ADMIN — MIDAZOLAM HYDROCHLORIDE 1 MG: 1 INJECTION INTRAMUSCULAR; INTRAVENOUS at 09:34

## 2023-10-18 RX ADMIN — CLOPIDOGREL 600 MG: 300 TABLET, FILM COATED ORAL at 09:57

## 2023-10-18 RX ADMIN — FENTANYL CITRATE 25 MCG: 50 INJECTION, SOLUTION INTRAMUSCULAR; INTRAVENOUS at 10:04

## 2023-10-18 RX ADMIN — MIDAZOLAM HYDROCHLORIDE 0.5 MG: 1 INJECTION INTRAMUSCULAR; INTRAVENOUS at 09:39

## 2023-10-18 RX ADMIN — ATORVASTATIN CALCIUM 40 MG: 40 TABLET, FILM COATED ORAL at 15:27

## 2023-10-18 RX ADMIN — FENTANYL CITRATE 50 MCG: 50 INJECTION, SOLUTION INTRAMUSCULAR; INTRAVENOUS at 09:34

## 2023-10-18 RX ADMIN — MIDAZOLAM HYDROCHLORIDE 0.5 MG: 1 INJECTION INTRAMUSCULAR; INTRAVENOUS at 10:04

## 2023-10-18 RX ADMIN — BIVALIRUDIN 1.75 MG/KG/HR: 250 INJECTION, POWDER, LYOPHILIZED, FOR SOLUTION INTRAVENOUS at 09:52

## 2023-10-18 NOTE — DISCHARGE INSTRUCTIONS
Cath Labs at  [facility]   Discharge Instructions        10/18/2023  Sung Wolf   Date of Birth 1965       Activity:  No driving for 24 hours. In 24 hours you may remove dressing and shower, wash site gently with soap and water and leave open to air  Avoid submerging your arm in sitting water for 5 days. Do not use your right hand for 24 hours, then  No lifting more than 5 pounds for 5 days. No lotions, powders, or ointments near site for 5 days. No work/school for 5 days unless instructed otherwise by your cardiologist.    Diet:   Resume previous diet, if a cardiac diet is specified you will receive a handout with  general guidelines. Drink extra non-alcoholic/decaffienated fluids for first 24 hours after your procedure. Arm Management:  If bleeding occurs from the site or a hematoma (lump) begins to increase in size, apply pressure directly over the site, call 911 to return to the hospital.    Special Instructions:  Report any coolness or numbness in the arm  Report any chills, fever, itching, red bumps or rash   Report any of the following to the MD: drainage from the site, redness and/or swelling at the site, increased tenderness at the site   If you are currently taking Metformin or Metformin combination medications for Diabetes, hold your dose for 48 hours after your procedure. Consult your Cardiologist before taking any NSAIDS, vitamin supplements, estrogen, or estrogen plus progestin. Do not stop taking Plavix, Brilinta or Effient, without first consulting your cardiologist.    Sedation Discharge Instructions: For the next 24 hours do not drive a car, operate machinery, power tools or kitchen appliances. Do not drink alcohol; including beer or wine. Do not make any important decisions or sign any important papers. For the next 24 hours you can expect drowsiness, light-headed or dizziness, nausea/ vomiting, inability to concentrate, fatigue and desire to sleep.   We

## 2023-10-18 NOTE — PROGRESS NOTES
Patient/family given discharge instructions. Patient/family verbalize understanding of discharge instructions, all questions addressed, copy given to patient/family. Pt transferred to vehicle via wheelchair to be discharged home with family.

## 2023-10-18 NOTE — H&P
[address]   Cardiovascular Evaluation    PATIENT: Maggie Carter  DATE: 10/18/2023  MRN: [id number]  CSN: [id number]  : 1965    Primary Care Doctor/Referring provider: Evelyn Mills MD, Trent Callaway MD     Reason for evaluation/Chief complaint:   Pre-operative evaluation. Subjective:    History of present illness on initial date of evaluation:   Maggie Carter is a 59 y.o. male patient who presents as a new patient for cardiology evaluation and treatment for cardiac clearance for abdominal aorta aneurysm open repair with . He has a past medical history including hypertension, carotid artery stenosis, hyperlipidemia, tobacco abuse, and AAA. The patient had a abdominal CTA measuring 5.1 cm on 23. He had a Lexiscan stress myoview 23 fixed defect consistent with scar/prior infarction, EF 46%. Today he presents with his sister for visit. He reports he follows with PCP Evelyn Mills MD. He lives in [place], [place]. He reports he was initially having issues with right shoulder. Incidentally AAA was found on scan in relation to his shoulder. He reports he has lower back pain. 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 He states he has fatigue after 30 minutes on hot days and has to stop to rest with push mowing. He reports at times will experience lightheadedness with this activity. He reports he is a active smoker. He has lost 30 pounds in 6 months. He has sleep disturbance. He has issue with his anxiety. He reports he has tried various statin medications with muscle myalgias that are intolerable. He reports he was unable to attend a sleep study.      Patient Active Problem List   Diagnosis    DDD (degenerative disc disease), cervical    Displacement of cervical intervertebral disc without myelopathy    Aneurysm of infrarenal abdominal aorta (HCC)    Anxiety disorder    Benign essential hypertension    Benign

## 2023-10-18 NOTE — PROCEDURES
[facility]       Cardiac Catheterization Lab Report    PATIENT: Kera Rojas  DATE: 10/18/2023  MRN: [id number]  CSN: [id number]  : 1965      Performing Physician: Marleny Fortune MD, [address] [place] [address]  Primary Care Physician: Skip Zarco MD  Admitting/Referring provider: Roman Reyes MD     Procedures Performed:   1. Left heart catheterization  2. Selective left and right coronary angiogram  3. Left ventriculography   4. IVUS of the RCA  5. TAWANDA to the RCA x 2    Procedure Findings:  1. 70-90% mid RCA  ~mild to moderate coronary artery disease. 2. Normal left ventricular function with EF estimated at 55-60%  3. Normal left heart hemodynamics    Indications:   Patient Active Problem List   Diagnosis    DDD (degenerative disc disease), cervical    Displacement of cervical intervertebral disc without myelopathy    Aneurysm of infrarenal abdominal aorta (HCC)    Anxiety disorder    Benign essential hypertension    Benign prostatic hyperplasia with lower urinary tract symptoms    Carotid artery stenosis    Chronic obstructive pulmonary disease (HCC)    Current smoker    Degeneration of lumbar or lumbosacral intervertebral disc    Depression    Diverticular disease    Esophageal reflux    Male erectile disorder    Neck sprain    Nicotine dependence    Other and unspecified hyperlipidemia    Primary localized osteoarthrosis of shoulder region    Recurrent major depression-severe ([address])    Suicidal ideation    HLD (hyperlipidemia)    Pre-operative cardiovascular examination    Fatigue    Dizziness    Abnormal stress test    SOB (shortness of breath)    Weight loss, abnormal       Details:   Kera Rojas was brought to the cardiac catheterization lab in a fasting state after informed consent was obtained. If the patient was able to provide written consent, it was obtained. The patient's vitals were monitored through out the procedure.  The patient was sedated using the

## 2023-10-18 NOTE — FLOWSHEET NOTE
Pt educated prior to discharge on stents, meds, cardiac rehab and follow up plan of care for same day PCI / radial discharge to home. Pt has copy of discharge instructions and education folder with above information. Pt states he is going to follow instructions,  Pt continues to elevate RUE. Right radial site remains unremarkable, arm board remains in place. Pt in good spirits.

## 2023-10-27 ENCOUNTER — TELEPHONE (OUTPATIENT)
Dept: VASCULAR SURGERY | Age: 58
End: 2023-10-27

## 2023-10-30 ENCOUNTER — TELEPHONE (OUTPATIENT)
Dept: VASCULAR SURGERY | Age: 58
End: 2023-10-30

## 2023-10-30 NOTE — TELEPHONE ENCOUNTER
Called patient regarding apt with Dr Osei Diallo. He will need to come into see Dr Osei Diallo on a Friday. pamlr

## 2023-11-03 ENCOUNTER — OFFICE VISIT (OUTPATIENT)
Dept: VASCULAR SURGERY | Age: 58
End: 2023-11-03
Payer: COMMERCIAL

## 2023-11-03 ENCOUNTER — OFFICE VISIT (OUTPATIENT)
Dept: CARDIOLOGY CLINIC | Age: 58
End: 2023-11-03
Payer: COMMERCIAL

## 2023-11-03 VITALS
WEIGHT: 173 LBS | DIASTOLIC BLOOD PRESSURE: 72 MMHG | OXYGEN SATURATION: 97 % | BODY MASS INDEX: 25.62 KG/M2 | HEART RATE: 66 BPM | HEIGHT: 69 IN | SYSTOLIC BLOOD PRESSURE: 130 MMHG

## 2023-11-03 VITALS
SYSTOLIC BLOOD PRESSURE: 140 MMHG | HEIGHT: 69 IN | DIASTOLIC BLOOD PRESSURE: 82 MMHG | BODY MASS INDEX: 25.62 KG/M2 | WEIGHT: 173 LBS

## 2023-11-03 DIAGNOSIS — F17.200 CURRENT SMOKER: ICD-10-CM

## 2023-11-03 DIAGNOSIS — I10 BENIGN ESSENTIAL HYPERTENSION: Primary | ICD-10-CM

## 2023-11-03 DIAGNOSIS — I71.43 INFRARENAL ABDOMINAL AORTIC ANEURYSM (AAA) WITHOUT RUPTURE (HCC): Primary | ICD-10-CM

## 2023-11-03 DIAGNOSIS — I25.10 CORONARY ARTERY DISEASE INVOLVING NATIVE CORONARY ARTERY OF NATIVE HEART WITHOUT ANGINA PECTORIS: ICD-10-CM

## 2023-11-03 DIAGNOSIS — E78.2 MIXED HYPERLIPIDEMIA: ICD-10-CM

## 2023-11-03 DIAGNOSIS — I71.40 ABDOMINAL AORTIC ANEURYSM (AAA) WITHOUT RUPTURE, UNSPECIFIED PART (HCC): ICD-10-CM

## 2023-11-03 PROCEDURE — 3079F DIAST BP 80-89 MM HG: CPT | Performed by: SURGERY

## 2023-11-03 PROCEDURE — 4004F PT TOBACCO SCREEN RCVD TLK: CPT | Performed by: NURSE PRACTITIONER

## 2023-11-03 PROCEDURE — 3017F COLORECTAL CA SCREEN DOC REV: CPT | Performed by: SURGERY

## 2023-11-03 PROCEDURE — 3077F SYST BP >= 140 MM HG: CPT | Performed by: SURGERY

## 2023-11-03 PROCEDURE — 99214 OFFICE O/P EST MOD 30 MIN: CPT | Performed by: SURGERY

## 2023-11-03 PROCEDURE — 3078F DIAST BP <80 MM HG: CPT | Performed by: NURSE PRACTITIONER

## 2023-11-03 PROCEDURE — 99214 OFFICE O/P EST MOD 30 MIN: CPT | Performed by: NURSE PRACTITIONER

## 2023-11-03 PROCEDURE — 3017F COLORECTAL CA SCREEN DOC REV: CPT | Performed by: NURSE PRACTITIONER

## 2023-11-03 PROCEDURE — G8484 FLU IMMUNIZE NO ADMIN: HCPCS | Performed by: SURGERY

## 2023-11-03 PROCEDURE — 3075F SYST BP GE 130 - 139MM HG: CPT | Performed by: NURSE PRACTITIONER

## 2023-11-03 PROCEDURE — 4004F PT TOBACCO SCREEN RCVD TLK: CPT | Performed by: SURGERY

## 2023-11-03 PROCEDURE — G8484 FLU IMMUNIZE NO ADMIN: HCPCS | Performed by: NURSE PRACTITIONER

## 2023-11-03 PROCEDURE — G8427 DOCREV CUR MEDS BY ELIG CLIN: HCPCS | Performed by: SURGERY

## 2023-11-03 PROCEDURE — G8427 DOCREV CUR MEDS BY ELIG CLIN: HCPCS | Performed by: NURSE PRACTITIONER

## 2023-11-03 PROCEDURE — G8417 CALC BMI ABV UP PARAM F/U: HCPCS | Performed by: NURSE PRACTITIONER

## 2023-11-03 PROCEDURE — G8417 CALC BMI ABV UP PARAM F/U: HCPCS | Performed by: SURGERY

## 2023-11-03 NOTE — PROGRESS NOTES
coronary artery disease. 2. Normal left ventricular function with EF estimated at 55-60%  3. Normal left heart hemodynamics    Findings:  1. Left main coronary artery was normal. It gave off the left anterior descending artery and left circumflex. 2. Left anterior descending artery has mild to moderate atherosclerotic disease. It was moderate in size. It gave off septal perforators and a moderate sized diagonal branch. The LAD covered the entire apex of the left ventricle. 3. Left circumflex has mild to moderate atherosclerotic disease. It was moderate in size. There was a moderate sized obtuse marginal branch. 4. Right coronary artery has 70-90% mid atherosclerotic disease. It was moderate in size and was the dominant artery. 5. Left ventriculogram showed normal LVEF at 55-60%. Wall motion was normal . There was no significant mitral valve or aortic valve disease noted. LVEDP was normal. There was no gradient noted across the aortic valve during pullback of the catheter. CONCLUSIONS:     1. Successful IVUS guided drug-eluting stent insertion to the mid right coronary artery. ASSESSMENT/RECOMMENDATIONS:     1. Initiate dual antiplatelet therapy with aspirin 81 mg and Plavix 75 mg for a minimum of 4 weeks. 9/28/2023 Echo:  Summary Left ventricular cavity size is normal. There is mild left ventricular hypertrophy. Overall left ventricular systolic function appears normal with an ejection fraction of 55% The right ventricle is normal in size and function. Normal function of all valves. 8/23/2023 Lexiscan-Myoview;  Summary Abnormal study. There is a moderate sized, moderate intensity, fixed defect of the inferoseptal, inferior walls which is consistent with scar/prior infarction. There is mild global LV systolic dysfunction with ejection fraction of 46 %. Overall findings represent a intermediate risk scan.  Recommendation Based on positive stress test, risk for perioperative cardiac

## 2023-11-09 RX ORDER — CLOPIDOGREL BISULFATE 75 MG/1
75 TABLET ORAL DAILY
Qty: 90 TABLET | Refills: 3 | Status: SHIPPED | OUTPATIENT
Start: 2023-11-09

## 2023-11-17 PROBLEM — Z01.810 PRE-OPERATIVE CARDIOVASCULAR EXAMINATION: Status: RESOLVED | Noted: 2023-08-31 | Resolved: 2023-11-17

## 2023-11-21 ENCOUNTER — TELEPHONE (OUTPATIENT)
Dept: VASCULAR SURGERY | Age: 58
End: 2023-11-21

## 2023-11-21 NOTE — TELEPHONE ENCOUNTER
Called patient today regarding his surgery for December 4, 2023. He is to arrive at 5:30am for his 7:00am surgery. Npo after midnight. Stop Plavix 7 days prior to surgery. Other meds can take with a sip of water the morning.  Neftali

## 2023-12-01 ENCOUNTER — ANESTHESIA EVENT (OUTPATIENT)
Dept: OPERATING ROOM | Age: 58
End: 2023-12-01
Payer: COMMERCIAL

## 2023-12-01 ENCOUNTER — PREP FOR PROCEDURE (OUTPATIENT)
Dept: VASCULAR SURGERY | Age: 58
End: 2023-12-01

## 2023-12-01 DIAGNOSIS — Z01.818 PREOP TESTING: Primary | ICD-10-CM

## 2023-12-01 RX ORDER — SODIUM CHLORIDE 0.9 % (FLUSH) 0.9 %
5-40 SYRINGE (ML) INJECTION EVERY 12 HOURS SCHEDULED
Status: CANCELLED | OUTPATIENT
Start: 2023-12-01

## 2023-12-01 RX ORDER — SODIUM CHLORIDE 9 MG/ML
INJECTION, SOLUTION INTRAVENOUS PRN
Status: CANCELLED | OUTPATIENT
Start: 2023-12-01

## 2023-12-01 RX ORDER — SODIUM CHLORIDE 0.9 % (FLUSH) 0.9 %
5-40 SYRINGE (ML) INJECTION PRN
Status: CANCELLED | OUTPATIENT
Start: 2023-12-01

## 2023-12-04 ENCOUNTER — APPOINTMENT (OUTPATIENT)
Dept: GENERAL RADIOLOGY | Age: 58
DRG: 169 | End: 2023-12-04
Attending: SURGERY
Payer: COMMERCIAL

## 2023-12-04 ENCOUNTER — ANESTHESIA (OUTPATIENT)
Dept: OPERATING ROOM | Age: 58
End: 2023-12-04
Payer: COMMERCIAL

## 2023-12-04 ENCOUNTER — HOSPITAL ENCOUNTER (INPATIENT)
Age: 58
LOS: 7 days | Discharge: NURSING, REHAB OR LONG-TERM CARE | DRG: 169 | End: 2023-12-11
Attending: SURGERY | Admitting: SURGERY
Payer: COMMERCIAL

## 2023-12-04 DIAGNOSIS — G89.18 ACUTE POSTOPERATIVE PAIN: Primary | ICD-10-CM

## 2023-12-04 PROBLEM — I71.42 JUXTARENAL ABDOMINAL AORTIC ANEURYSM (AAA) WITHOUT RUPTURE (HCC): Status: ACTIVE | Noted: 2023-12-04

## 2023-12-04 PROBLEM — I71.40 AAA (ABDOMINAL AORTIC ANEURYSM) WITHOUT RUPTURE (HCC): Status: ACTIVE | Noted: 2023-12-04

## 2023-12-04 LAB
ABO + RH BLD: NORMAL
ANION GAP SERPL CALCULATED.3IONS-SCNC: 10 MMOL/L (ref 3–16)
ANION GAP SERPL CALCULATED.3IONS-SCNC: 6 MMOL/L (ref 3–16)
BASE EXCESS BLDA CALC-SCNC: 1 MMOL/L (ref -3–3)
BASE EXCESS BLDA CALC-SCNC: 1 MMOL/L (ref -3–3)
BASE EXCESS BLDA CALC-SCNC: 8 MMOL/L (ref -3–3)
BLD GP AB SCN SERPL QL: NORMAL
BUN SERPL-MCNC: 16 MG/DL (ref 7–20)
BUN SERPL-MCNC: 18 MG/DL (ref 7–20)
CA-I BLD-SCNC: 1.02 MMOL/L (ref 1.12–1.32)
CA-I BLD-SCNC: 1.08 MMOL/L (ref 1.12–1.32)
CA-I BLD-SCNC: 1.15 MMOL/L (ref 1.12–1.32)
CALCIUM SERPL-MCNC: 7 MG/DL (ref 8.3–10.6)
CALCIUM SERPL-MCNC: 8.9 MG/DL (ref 8.3–10.6)
CHLORIDE SERPL-SCNC: 105 MMOL/L (ref 99–110)
CHLORIDE SERPL-SCNC: 109 MMOL/L (ref 99–110)
CO2 BLDA-SCNC: 27 MMOL/L
CO2 BLDA-SCNC: 29 MMOL/L
CO2 BLDA-SCNC: 32 MMOL/L
CO2 SERPL-SCNC: 25 MMOL/L (ref 21–32)
CO2 SERPL-SCNC: 26 MMOL/L (ref 21–32)
CREAT SERPL-MCNC: 0.9 MG/DL (ref 0.9–1.3)
CREAT SERPL-MCNC: 0.9 MG/DL (ref 0.9–1.3)
DEPRECATED RDW RBC AUTO: 13.1 % (ref 12.4–15.4)
DEPRECATED RDW RBC AUTO: 13.2 % (ref 12.4–15.4)
EKG ATRIAL RATE: 72 BPM
EKG DIAGNOSIS: NORMAL
EKG P AXIS: 60 DEGREES
EKG P-R INTERVAL: 156 MS
EKG Q-T INTERVAL: 394 MS
EKG QRS DURATION: 78 MS
EKG QTC CALCULATION (BAZETT): 431 MS
EKG R AXIS: 18 DEGREES
EKG T AXIS: 1 DEGREES
EKG VENTRICULAR RATE: 72 BPM
GFR SERPLBLD CREATININE-BSD FMLA CKD-EPI: >60 ML/MIN/{1.73_M2}
GFR SERPLBLD CREATININE-BSD FMLA CKD-EPI: >60 ML/MIN/{1.73_M2}
GLUCOSE BLD-MCNC: 110 MG/DL (ref 70–99)
GLUCOSE BLD-MCNC: 112 MG/DL (ref 70–99)
GLUCOSE BLD-MCNC: 130 MG/DL (ref 70–99)
GLUCOSE SERPL-MCNC: 104 MG/DL (ref 70–99)
GLUCOSE SERPL-MCNC: 127 MG/DL (ref 70–99)
HCO3 BLDA-SCNC: 25.5 MMOL/L (ref 21–29)
HCO3 BLDA-SCNC: 27 MMOL/L (ref 21–29)
HCO3 BLDA-SCNC: 31 MMOL/L (ref 21–29)
HCT VFR BLD AUTO: 32 % (ref 40.5–52.5)
HCT VFR BLD AUTO: 32 % (ref 40.5–52.5)
HCT VFR BLD AUTO: 36.3 % (ref 40.5–52.5)
HCT VFR BLD AUTO: 43.4 % (ref 40.5–52.5)
HGB BLD CALC-MCNC: 10.7 GM/DL (ref 13.5–17.5)
HGB BLD CALC-MCNC: 10.9 GM/DL (ref 13.5–17.5)
HGB BLD-MCNC: 12.3 G/DL (ref 13.5–17.5)
HGB BLD-MCNC: 15 G/DL (ref 13.5–17.5)
LACTATE BLD-SCNC: 1.1 MMOL/L (ref 0.4–2)
LACTATE BLD-SCNC: 1.95 MMOL/L (ref 0.4–2)
LACTATE BLD-SCNC: 2.21 MMOL/L (ref 0.4–2)
MCH RBC QN AUTO: 34.1 PG (ref 26–34)
MCH RBC QN AUTO: 34.5 PG (ref 26–34)
MCHC RBC AUTO-ENTMCNC: 33.9 G/DL (ref 31–36)
MCHC RBC AUTO-ENTMCNC: 34.7 G/DL (ref 31–36)
MCV RBC AUTO: 100.6 FL (ref 80–100)
MCV RBC AUTO: 99.5 FL (ref 80–100)
PCO2 BLDA: 36.4 MM HG (ref 35–45)
PCO2 BLDA: 39.6 MM HG (ref 35–45)
PCO2 BLDA: 51.5 MM HG (ref 35–45)
PERFORMED ON: ABNORMAL
PH BLDA: 7.33 [PH] (ref 7.35–7.45)
PH BLDA: 7.42 [PH] (ref 7.35–7.45)
PH BLDA: 7.54 [PH] (ref 7.35–7.45)
PLATELET # BLD AUTO: 209 K/UL (ref 135–450)
PLATELET # BLD AUTO: 253 K/UL (ref 135–450)
PMV BLD AUTO: 7.4 FL (ref 5–10.5)
PMV BLD AUTO: 8 FL (ref 5–10.5)
PO2 BLDA: 124.8 MM HG (ref 75–108)
PO2 BLDA: 151 MM HG (ref 75–108)
PO2 BLDA: 250.2 MM HG (ref 75–108)
POC SAMPLE TYPE: ABNORMAL
POTASSIUM BLD-SCNC: 4.3 MMOL/L (ref 3.5–5.1)
POTASSIUM BLD-SCNC: 4.4 MMOL/L (ref 3.5–5.1)
POTASSIUM SERPL-SCNC: 4.2 MMOL/L (ref 3.5–5.1)
POTASSIUM SERPL-SCNC: 4.7 MMOL/L (ref 3.5–5.1)
RBC # BLD AUTO: 3.61 M/UL (ref 4.2–5.9)
RBC # BLD AUTO: 4.36 M/UL (ref 4.2–5.9)
SAO2 % BLDA: 100 % (ref 93–100)
SAO2 % BLDA: 99 % (ref 93–100)
SAO2 % BLDA: 99 % (ref 93–100)
SODIUM BLD-SCNC: 140 MMOL/L (ref 136–145)
SODIUM BLD-SCNC: 143 MMOL/L (ref 136–145)
SODIUM SERPL-SCNC: 140 MMOL/L (ref 136–145)
SODIUM SERPL-SCNC: 141 MMOL/L (ref 136–145)
WBC # BLD AUTO: 11.1 K/UL (ref 4–11)
WBC # BLD AUTO: 7.3 K/UL (ref 4–11)

## 2023-12-04 PROCEDURE — C9290 INJ, BUPIVACAINE LIPOSOME: HCPCS | Performed by: SURGERY

## 2023-12-04 PROCEDURE — 3600000017 HC SURGERY HYBRID ADDL 15MIN: Performed by: SURGERY

## 2023-12-04 PROCEDURE — 82947 ASSAY GLUCOSE BLOOD QUANT: CPT

## 2023-12-04 PROCEDURE — C1768 GRAFT, VASCULAR: HCPCS | Performed by: SURGERY

## 2023-12-04 PROCEDURE — 6360000002 HC RX W HCPCS: Performed by: SURGERY

## 2023-12-04 PROCEDURE — 93010 ELECTROCARDIOGRAM REPORT: CPT | Performed by: INTERNAL MEDICINE

## 2023-12-04 PROCEDURE — 2500000003 HC RX 250 WO HCPCS

## 2023-12-04 PROCEDURE — 2580000003 HC RX 258

## 2023-12-04 PROCEDURE — 2580000003 HC RX 258: Performed by: SURGERY

## 2023-12-04 PROCEDURE — 86900 BLOOD TYPING SEROLOGIC ABO: CPT

## 2023-12-04 PROCEDURE — 82803 BLOOD GASES ANY COMBINATION: CPT

## 2023-12-04 PROCEDURE — 86901 BLOOD TYPING SEROLOGIC RH(D): CPT

## 2023-12-04 PROCEDURE — 3700000000 HC ANESTHESIA ATTENDED CARE: Performed by: SURGERY

## 2023-12-04 PROCEDURE — 04R007Z REPLACEMENT OF ABDOMINAL AORTA WITH AUTOLOGOUS TISSUE SUBSTITUTE, OPEN APPROACH: ICD-10-PCS | Performed by: SURGERY

## 2023-12-04 PROCEDURE — C2628 CATHETER, OCCLUSION: HCPCS | Performed by: SURGERY

## 2023-12-04 PROCEDURE — 80048 BASIC METABOLIC PNL TOTAL CA: CPT

## 2023-12-04 PROCEDURE — 84295 ASSAY OF SERUM SODIUM: CPT

## 2023-12-04 PROCEDURE — 6370000000 HC RX 637 (ALT 250 FOR IP)

## 2023-12-04 PROCEDURE — 85027 COMPLETE CBC AUTOMATED: CPT

## 2023-12-04 PROCEDURE — 2000000000 HC ICU R&B

## 2023-12-04 PROCEDURE — 83605 ASSAY OF LACTIC ACID: CPT

## 2023-12-04 PROCEDURE — C1729 CATH, DRAINAGE: HCPCS | Performed by: SURGERY

## 2023-12-04 PROCEDURE — 6360000002 HC RX W HCPCS

## 2023-12-04 PROCEDURE — 2500000003 HC RX 250 WO HCPCS: Performed by: CLINICAL NURSE SPECIALIST

## 2023-12-04 PROCEDURE — 02HV33Z INSERTION OF INFUSION DEVICE INTO SUPERIOR VENA CAVA, PERCUTANEOUS APPROACH: ICD-10-PCS | Performed by: SURGERY

## 2023-12-04 PROCEDURE — 2580000003 HC RX 258: Performed by: CLINICAL NURSE SPECIALIST

## 2023-12-04 PROCEDURE — 94761 N-INVAS EAR/PLS OXIMETRY MLT: CPT

## 2023-12-04 PROCEDURE — 85014 HEMATOCRIT: CPT

## 2023-12-04 PROCEDURE — 3600000007 HC SURGERY HYBRID BASE: Performed by: SURGERY

## 2023-12-04 PROCEDURE — A4217 STERILE WATER/SALINE, 500 ML: HCPCS | Performed by: SURGERY

## 2023-12-04 PROCEDURE — 35102 REPAIR DEFECT OF ARTERY: CPT | Performed by: SURGERY

## 2023-12-04 PROCEDURE — 71045 X-RAY EXAM CHEST 1 VIEW: CPT

## 2023-12-04 PROCEDURE — 36415 COLL VENOUS BLD VENIPUNCTURE: CPT

## 2023-12-04 PROCEDURE — 2709999900 HC NON-CHARGEABLE SUPPLY: Performed by: SURGERY

## 2023-12-04 PROCEDURE — 93005 ELECTROCARDIOGRAM TRACING: CPT | Performed by: CLINICAL NURSE SPECIALIST

## 2023-12-04 PROCEDURE — 2700000000 HC OXYGEN THERAPY PER DAY

## 2023-12-04 PROCEDURE — 2580000003 HC RX 258: Performed by: ANESTHESIOLOGY

## 2023-12-04 PROCEDURE — 82330 ASSAY OF CALCIUM: CPT

## 2023-12-04 PROCEDURE — 86850 RBC ANTIBODY SCREEN: CPT

## 2023-12-04 PROCEDURE — 3700000001 HC ADD 15 MINUTES (ANESTHESIA): Performed by: SURGERY

## 2023-12-04 PROCEDURE — 84132 ASSAY OF SERUM POTASSIUM: CPT

## 2023-12-04 DEVICE — HEMASHIELD PLATINUM WOVEN BIFURCATED DOUBLE VELOUR VASCULAR GRAFT WITH GRAFT SIZER ACCESSORY
Type: IMPLANTABLE DEVICE | Site: ABDOMEN | Status: FUNCTIONAL
Brand: HEMASHIELD

## 2023-12-04 RX ORDER — ONDANSETRON 2 MG/ML
4 INJECTION INTRAMUSCULAR; INTRAVENOUS
Status: DISCONTINUED | OUTPATIENT
Start: 2023-12-04 | End: 2023-12-05 | Stop reason: HOSPADM

## 2023-12-04 RX ORDER — SODIUM CHLORIDE 9 MG/ML
INJECTION, SOLUTION INTRAVENOUS PRN
Status: DISCONTINUED | OUTPATIENT
Start: 2023-12-04 | End: 2023-12-04 | Stop reason: HOSPADM

## 2023-12-04 RX ORDER — NITROGLYCERIN 20 MG/100ML
5 INJECTION INTRAVENOUS CONTINUOUS PRN
Status: DISCONTINUED | OUTPATIENT
Start: 2023-12-04 | End: 2023-12-08

## 2023-12-04 RX ORDER — SODIUM CHLORIDE 0.9 % (FLUSH) 0.9 %
5-40 SYRINGE (ML) INJECTION PRN
Status: DISCONTINUED | OUTPATIENT
Start: 2023-12-04 | End: 2023-12-11 | Stop reason: HOSPADM

## 2023-12-04 RX ORDER — ONDANSETRON 2 MG/ML
INJECTION INTRAMUSCULAR; INTRAVENOUS PRN
Status: DISCONTINUED | OUTPATIENT
Start: 2023-12-04 | End: 2023-12-04 | Stop reason: SDUPTHER

## 2023-12-04 RX ORDER — MORPHINE SULFATE/0.9% NACL/PF 1 MG/ML
SYRINGE (ML) INJECTION CONTINUOUS
Status: DISCONTINUED | OUTPATIENT
Start: 2023-12-04 | End: 2023-12-06

## 2023-12-04 RX ORDER — SODIUM CHLORIDE 9 MG/ML
INJECTION, SOLUTION INTRAVENOUS PRN
Status: DISCONTINUED | OUTPATIENT
Start: 2023-12-04 | End: 2023-12-04 | Stop reason: SDUPTHER

## 2023-12-04 RX ORDER — LIDOCAINE HYDROCHLORIDE 20 MG/ML
INJECTION, SOLUTION EPIDURAL; INFILTRATION; INTRACAUDAL; PERINEURAL PRN
Status: DISCONTINUED | OUTPATIENT
Start: 2023-12-04 | End: 2023-12-04 | Stop reason: SDUPTHER

## 2023-12-04 RX ORDER — LABETALOL HYDROCHLORIDE 5 MG/ML
INJECTION, SOLUTION INTRAVENOUS PRN
Status: DISCONTINUED | OUTPATIENT
Start: 2023-12-04 | End: 2023-12-04 | Stop reason: SDUPTHER

## 2023-12-04 RX ORDER — METOPROLOL TARTRATE 1 MG/ML
5 INJECTION, SOLUTION INTRAVENOUS EVERY 6 HOURS
Status: DISCONTINUED | OUTPATIENT
Start: 2023-12-04 | End: 2023-12-06

## 2023-12-04 RX ORDER — ONDANSETRON 2 MG/ML
4 INJECTION INTRAMUSCULAR; INTRAVENOUS EVERY 6 HOURS PRN
Status: DISCONTINUED | OUTPATIENT
Start: 2023-12-04 | End: 2023-12-11 | Stop reason: HOSPADM

## 2023-12-04 RX ORDER — SODIUM CHLORIDE 0.9 % (FLUSH) 0.9 %
5-40 SYRINGE (ML) INJECTION EVERY 12 HOURS SCHEDULED
Status: DISCONTINUED | OUTPATIENT
Start: 2023-12-04 | End: 2023-12-04 | Stop reason: HOSPADM

## 2023-12-04 RX ORDER — PHENYLEPHRINE HCL IN 0.9% NACL 1 MG/10 ML
SYRINGE (ML) INTRAVENOUS PRN
Status: DISCONTINUED | OUTPATIENT
Start: 2023-12-04 | End: 2023-12-04 | Stop reason: SDUPTHER

## 2023-12-04 RX ORDER — HYDROMORPHONE HYDROCHLORIDE 1 MG/ML
0.5 INJECTION, SOLUTION INTRAMUSCULAR; INTRAVENOUS; SUBCUTANEOUS EVERY 5 MIN PRN
Status: DISCONTINUED | OUTPATIENT
Start: 2023-12-04 | End: 2023-12-05 | Stop reason: HOSPADM

## 2023-12-04 RX ORDER — SODIUM CHLORIDE, SODIUM LACTATE, POTASSIUM CHLORIDE, CALCIUM CHLORIDE 600; 310; 30; 20 MG/100ML; MG/100ML; MG/100ML; MG/100ML
INJECTION, SOLUTION INTRAVENOUS CONTINUOUS
Status: DISCONTINUED | OUTPATIENT
Start: 2023-12-04 | End: 2023-12-04

## 2023-12-04 RX ORDER — DROPERIDOL 2.5 MG/ML
0.62 INJECTION, SOLUTION INTRAMUSCULAR; INTRAVENOUS
Status: DISCONTINUED | OUTPATIENT
Start: 2023-12-04 | End: 2023-12-05 | Stop reason: HOSPADM

## 2023-12-04 RX ORDER — MAGNESIUM SULFATE 1 G/100ML
1000 INJECTION INTRAVENOUS PRN
Status: DISCONTINUED | OUTPATIENT
Start: 2023-12-04 | End: 2023-12-11

## 2023-12-04 RX ORDER — MIDAZOLAM HYDROCHLORIDE 1 MG/ML
INJECTION INTRAMUSCULAR; INTRAVENOUS PRN
Status: DISCONTINUED | OUTPATIENT
Start: 2023-12-04 | End: 2023-12-04 | Stop reason: SDUPTHER

## 2023-12-04 RX ORDER — ALBUTEROL SULFATE 90 UG/1
AEROSOL, METERED RESPIRATORY (INHALATION) PRN
Status: DISCONTINUED | OUTPATIENT
Start: 2023-12-04 | End: 2023-12-04 | Stop reason: SDUPTHER

## 2023-12-04 RX ORDER — SODIUM CHLORIDE, SODIUM LACTATE, POTASSIUM CHLORIDE, CALCIUM CHLORIDE 600; 310; 30; 20 MG/100ML; MG/100ML; MG/100ML; MG/100ML
INJECTION, SOLUTION INTRAVENOUS CONTINUOUS
Status: DISCONTINUED | OUTPATIENT
Start: 2023-12-04 | End: 2023-12-06

## 2023-12-04 RX ORDER — SODIUM CHLORIDE 9 MG/ML
INJECTION, SOLUTION INTRAVENOUS PRN
Status: DISCONTINUED | OUTPATIENT
Start: 2023-12-04 | End: 2023-12-11

## 2023-12-04 RX ORDER — GLYCOPYRROLATE 0.2 MG/ML
INJECTION INTRAMUSCULAR; INTRAVENOUS PRN
Status: DISCONTINUED | OUTPATIENT
Start: 2023-12-04 | End: 2023-12-04 | Stop reason: SDUPTHER

## 2023-12-04 RX ORDER — HYDROMORPHONE HYDROCHLORIDE 1 MG/ML
0.25 INJECTION, SOLUTION INTRAMUSCULAR; INTRAVENOUS; SUBCUTANEOUS EVERY 5 MIN PRN
Status: DISCONTINUED | OUTPATIENT
Start: 2023-12-04 | End: 2023-12-05 | Stop reason: HOSPADM

## 2023-12-04 RX ORDER — 0.9 % SODIUM CHLORIDE 0.9 %
1000 INTRAVENOUS SOLUTION INTRAVENOUS ONCE
Status: COMPLETED | OUTPATIENT
Start: 2023-12-04 | End: 2023-12-04

## 2023-12-04 RX ORDER — ACETAMINOPHEN 500 MG
1000 TABLET ORAL
Status: DISCONTINUED | OUTPATIENT
Start: 2023-12-04 | End: 2023-12-05 | Stop reason: HOSPADM

## 2023-12-04 RX ORDER — SODIUM CHLORIDE 0.9 % (FLUSH) 0.9 %
5-40 SYRINGE (ML) INJECTION EVERY 12 HOURS SCHEDULED
Status: DISCONTINUED | OUTPATIENT
Start: 2023-12-04 | End: 2023-12-04 | Stop reason: SDUPTHER

## 2023-12-04 RX ORDER — NITROGLYCERIN 5 MG/ML
INJECTION, SOLUTION INTRAVENOUS PRN
Status: DISCONTINUED | OUTPATIENT
Start: 2023-12-04 | End: 2023-12-04 | Stop reason: SDUPTHER

## 2023-12-04 RX ORDER — OXYCODONE HYDROCHLORIDE 5 MG/1
5 TABLET ORAL EVERY 30 MIN PRN
Status: DISCONTINUED | OUTPATIENT
Start: 2023-12-04 | End: 2023-12-05 | Stop reason: HOSPADM

## 2023-12-04 RX ORDER — SODIUM CHLORIDE 0.9 % (FLUSH) 0.9 %
5-40 SYRINGE (ML) INJECTION PRN
Status: DISCONTINUED | OUTPATIENT
Start: 2023-12-04 | End: 2023-12-04 | Stop reason: HOSPADM

## 2023-12-04 RX ORDER — PROPOFOL 10 MG/ML
INJECTION, EMULSION INTRAVENOUS PRN
Status: DISCONTINUED | OUTPATIENT
Start: 2023-12-04 | End: 2023-12-04 | Stop reason: SDUPTHER

## 2023-12-04 RX ORDER — SODIUM CHLORIDE 0.9 % (FLUSH) 0.9 %
5-40 SYRINGE (ML) INJECTION EVERY 12 HOURS SCHEDULED
Status: DISCONTINUED | OUTPATIENT
Start: 2023-12-04 | End: 2023-12-11 | Stop reason: HOSPADM

## 2023-12-04 RX ORDER — HYDRALAZINE HYDROCHLORIDE 20 MG/ML
10 INJECTION INTRAMUSCULAR; INTRAVENOUS
Status: DISCONTINUED | OUTPATIENT
Start: 2023-12-04 | End: 2023-12-11

## 2023-12-04 RX ORDER — MANNITOL 250 MG/ML
INJECTION, SOLUTION INTRAVENOUS PRN
Status: DISCONTINUED | OUTPATIENT
Start: 2023-12-04 | End: 2023-12-04 | Stop reason: SDUPTHER

## 2023-12-04 RX ORDER — POTASSIUM CHLORIDE 29.8 MG/ML
20 INJECTION INTRAVENOUS PRN
Status: DISCONTINUED | OUTPATIENT
Start: 2023-12-04 | End: 2023-12-07

## 2023-12-04 RX ORDER — HEPARIN SODIUM 1000 [USP'U]/ML
INJECTION, SOLUTION INTRAVENOUS; SUBCUTANEOUS PRN
Status: DISCONTINUED | OUTPATIENT
Start: 2023-12-04 | End: 2023-12-04 | Stop reason: SDUPTHER

## 2023-12-04 RX ORDER — MIDAZOLAM HYDROCHLORIDE 1 MG/ML
2 INJECTION INTRAMUSCULAR; INTRAVENOUS
Status: DISCONTINUED | OUTPATIENT
Start: 2023-12-04 | End: 2023-12-04 | Stop reason: HOSPADM

## 2023-12-04 RX ORDER — NALOXONE HYDROCHLORIDE 0.4 MG/ML
INJECTION, SOLUTION INTRAMUSCULAR; INTRAVENOUS; SUBCUTANEOUS PRN
Status: DISCONTINUED | OUTPATIENT
Start: 2023-12-04 | End: 2023-12-11

## 2023-12-04 RX ORDER — SODIUM CHLORIDE 0.9 % (FLUSH) 0.9 %
5-40 SYRINGE (ML) INJECTION PRN
Status: DISCONTINUED | OUTPATIENT
Start: 2023-12-04 | End: 2023-12-04 | Stop reason: SDUPTHER

## 2023-12-04 RX ORDER — MEPERIDINE HYDROCHLORIDE 50 MG/ML
12.5 INJECTION INTRAMUSCULAR; INTRAVENOUS; SUBCUTANEOUS EVERY 5 MIN PRN
Status: DISCONTINUED | OUTPATIENT
Start: 2023-12-04 | End: 2023-12-05 | Stop reason: HOSPADM

## 2023-12-04 RX ORDER — DEXAMETHASONE SODIUM PHOSPHATE 4 MG/ML
INJECTION, SOLUTION INTRA-ARTICULAR; INTRALESIONAL; INTRAMUSCULAR; INTRAVENOUS; SOFT TISSUE PRN
Status: DISCONTINUED | OUTPATIENT
Start: 2023-12-04 | End: 2023-12-04 | Stop reason: SDUPTHER

## 2023-12-04 RX ORDER — LIDOCAINE HYDROCHLORIDE 10 MG/ML
1 INJECTION, SOLUTION EPIDURAL; INFILTRATION; INTRACAUDAL; PERINEURAL
Status: DISCONTINUED | OUTPATIENT
Start: 2023-12-04 | End: 2023-12-04 | Stop reason: HOSPADM

## 2023-12-04 RX ORDER — HYDROMORPHONE HYDROCHLORIDE 2 MG/ML
INJECTION, SOLUTION INTRAMUSCULAR; INTRAVENOUS; SUBCUTANEOUS PRN
Status: DISCONTINUED | OUTPATIENT
Start: 2023-12-04 | End: 2023-12-04 | Stop reason: SDUPTHER

## 2023-12-04 RX ORDER — FENTANYL CITRATE 50 UG/ML
INJECTION, SOLUTION INTRAMUSCULAR; INTRAVENOUS PRN
Status: DISCONTINUED | OUTPATIENT
Start: 2023-12-04 | End: 2023-12-04 | Stop reason: SDUPTHER

## 2023-12-04 RX ORDER — CEFAZOLIN SODIUM IN 0.9 % NACL 2 G/100 ML
2000 PLASTIC BAG, INJECTION (ML) INTRAVENOUS
Status: COMPLETED | OUTPATIENT
Start: 2023-12-04 | End: 2023-12-04

## 2023-12-04 RX ORDER — ROCURONIUM BROMIDE 10 MG/ML
INJECTION, SOLUTION INTRAVENOUS PRN
Status: DISCONTINUED | OUTPATIENT
Start: 2023-12-04 | End: 2023-12-04 | Stop reason: SDUPTHER

## 2023-12-04 RX ORDER — PROMETHAZINE HYDROCHLORIDE 25 MG/1
12.5 TABLET ORAL EVERY 6 HOURS PRN
Status: DISCONTINUED | OUTPATIENT
Start: 2023-12-04 | End: 2023-12-11 | Stop reason: HOSPADM

## 2023-12-04 RX ADMIN — SODIUM CHLORIDE, POTASSIUM CHLORIDE, SODIUM LACTATE AND CALCIUM CHLORIDE: 600; 310; 30; 20 INJECTION, SOLUTION INTRAVENOUS at 11:26

## 2023-12-04 RX ADMIN — PROPOFOL 200 MG: 10 INJECTION, EMULSION INTRAVENOUS at 07:09

## 2023-12-04 RX ADMIN — FENTANYL CITRATE 50 MCG: 50 INJECTION, SOLUTION INTRAMUSCULAR; INTRAVENOUS at 08:09

## 2023-12-04 RX ADMIN — LABETALOL HYDROCHLORIDE 5 MG: 5 INJECTION, SOLUTION INTRAVENOUS at 10:27

## 2023-12-04 RX ADMIN — NITROGLYCERIN 50 MCG: 5 INJECTION, SOLUTION INTRAVENOUS at 10:22

## 2023-12-04 RX ADMIN — NITROGLYCERIN 50 MCG: 5 INJECTION, SOLUTION INTRAVENOUS at 07:28

## 2023-12-04 RX ADMIN — FENTANYL CITRATE 50 MCG: 50 INJECTION, SOLUTION INTRAMUSCULAR; INTRAVENOUS at 07:07

## 2023-12-04 RX ADMIN — HYDROMORPHONE HYDROCHLORIDE 0.4 MG: 2 INJECTION, SOLUTION INTRAMUSCULAR; INTRAVENOUS; SUBCUTANEOUS at 10:39

## 2023-12-04 RX ADMIN — LABETALOL HYDROCHLORIDE 5 MG: 5 INJECTION, SOLUTION INTRAVENOUS at 10:38

## 2023-12-04 RX ADMIN — FENTANYL CITRATE 50 MCG: 50 INJECTION, SOLUTION INTRAMUSCULAR; INTRAVENOUS at 08:48

## 2023-12-04 RX ADMIN — HYDROMORPHONE HYDROCHLORIDE 0.4 MG: 2 INJECTION, SOLUTION INTRAMUSCULAR; INTRAVENOUS; SUBCUTANEOUS at 10:53

## 2023-12-04 RX ADMIN — PHENYLEPHRINE HYDROCHLORIDE 25 MCG/MIN: 10 INJECTION INTRAVENOUS at 07:50

## 2023-12-04 RX ADMIN — NITROGLYCERIN 50 MCG/MIN: 5 INJECTION, SOLUTION INTRAVENOUS at 08:48

## 2023-12-04 RX ADMIN — LABETALOL HYDROCHLORIDE 5 MG: 5 INJECTION, SOLUTION INTRAVENOUS at 10:33

## 2023-12-04 RX ADMIN — NITROGLYCERIN 3 MCG/MIN: 20 INJECTION INTRAVENOUS at 17:15

## 2023-12-04 RX ADMIN — NITROGLYCERIN 50 MCG: 5 INJECTION, SOLUTION INTRAVENOUS at 08:53

## 2023-12-04 RX ADMIN — ONDANSETRON 4 MG: 2 INJECTION INTRAMUSCULAR; INTRAVENOUS at 07:35

## 2023-12-04 RX ADMIN — ROCURONIUM BROMIDE 50 MG: 50 INJECTION, SOLUTION INTRAVENOUS at 09:08

## 2023-12-04 RX ADMIN — FENTANYL CITRATE 50 MCG: 50 INJECTION, SOLUTION INTRAMUSCULAR; INTRAVENOUS at 08:02

## 2023-12-04 RX ADMIN — SODIUM CHLORIDE, SODIUM LACTATE, POTASSIUM CHLORIDE, AND CALCIUM CHLORIDE: .6; .31; .03; .02 INJECTION, SOLUTION INTRAVENOUS at 09:25

## 2023-12-04 RX ADMIN — NITROGLYCERIN 50 MCG: 5 INJECTION, SOLUTION INTRAVENOUS at 10:25

## 2023-12-04 RX ADMIN — NITROGLYCERIN 50 MCG: 5 INJECTION, SOLUTION INTRAVENOUS at 08:51

## 2023-12-04 RX ADMIN — MANNITOL 12.5 G: 12.5 INJECTION, SOLUTION INTRAVENOUS at 08:47

## 2023-12-04 RX ADMIN — Medication 2000 MG: at 07:10

## 2023-12-04 RX ADMIN — NITROGLYCERIN 50 MCG: 5 INJECTION, SOLUTION INTRAVENOUS at 08:34

## 2023-12-04 RX ADMIN — SODIUM CHLORIDE 1000 ML: 9 INJECTION, SOLUTION INTRAVENOUS at 10:56

## 2023-12-04 RX ADMIN — LIDOCAINE HYDROCHLORIDE 100 MG: 20 INJECTION, SOLUTION EPIDURAL; INFILTRATION; INTRACAUDAL at 07:07

## 2023-12-04 RX ADMIN — Medication 50 MCG: at 07:54

## 2023-12-04 RX ADMIN — NITROGLYCERIN 50 MCG: 5 INJECTION, SOLUTION INTRAVENOUS at 08:05

## 2023-12-04 RX ADMIN — SODIUM CHLORIDE, SODIUM LACTATE, POTASSIUM CHLORIDE, AND CALCIUM CHLORIDE: .6; .31; .03; .02 INJECTION, SOLUTION INTRAVENOUS at 07:03

## 2023-12-04 RX ADMIN — HYDROMORPHONE HYDROCHLORIDE 0.3 MG: 2 INJECTION, SOLUTION INTRAMUSCULAR; INTRAVENOUS; SUBCUTANEOUS at 10:50

## 2023-12-04 RX ADMIN — Medication 50 MCG: at 07:51

## 2023-12-04 RX ADMIN — SODIUM BICARBONATE 50 MEQ: 84 INJECTION, SOLUTION INTRAVENOUS at 08:54

## 2023-12-04 RX ADMIN — HEPARIN SODIUM 5000 UNITS: 1000 INJECTION, SOLUTION INTRAVENOUS; SUBCUTANEOUS at 08:47

## 2023-12-04 RX ADMIN — GLYCOPYRROLATE 0.2 MG: 0.2 INJECTION, SOLUTION INTRAMUSCULAR; INTRAVENOUS at 08:57

## 2023-12-04 RX ADMIN — ROCURONIUM BROMIDE 30 MG: 50 INJECTION, SOLUTION INTRAVENOUS at 08:07

## 2023-12-04 RX ADMIN — Medication 100 MCG: at 09:40

## 2023-12-04 RX ADMIN — MORPHINE SULFATE: 1 INJECTION INTRAVENOUS at 11:26

## 2023-12-04 RX ADMIN — NITROGLYCERIN 5 MCG/MIN: 20 INJECTION INTRAVENOUS at 10:55

## 2023-12-04 RX ADMIN — ROCURONIUM BROMIDE 70 MG: 50 INJECTION, SOLUTION INTRAVENOUS at 07:07

## 2023-12-04 RX ADMIN — Medication 100 MCG: at 09:25

## 2023-12-04 RX ADMIN — SUGAMMADEX 200 MG: 100 INJECTION, SOLUTION INTRAVENOUS at 10:24

## 2023-12-04 RX ADMIN — SODIUM BICARBONATE 50 MEQ: 84 INJECTION, SOLUTION INTRAVENOUS at 08:55

## 2023-12-04 RX ADMIN — Medication 50 MCG: at 07:22

## 2023-12-04 RX ADMIN — HYDROMORPHONE HYDROCHLORIDE 0.2 MG: 2 INJECTION, SOLUTION INTRAMUSCULAR; INTRAVENOUS; SUBCUTANEOUS at 10:07

## 2023-12-04 RX ADMIN — SODIUM CHLORIDE, POTASSIUM CHLORIDE, SODIUM LACTATE AND CALCIUM CHLORIDE: 600; 310; 30; 20 INJECTION, SOLUTION INTRAVENOUS at 18:24

## 2023-12-04 RX ADMIN — METOPROLOL TARTRATE 5 MG: 5 INJECTION INTRAVENOUS at 23:14

## 2023-12-04 RX ADMIN — Medication 2 PUFF: at 07:02

## 2023-12-04 RX ADMIN — Medication 10 ML: at 20:26

## 2023-12-04 RX ADMIN — NITROGLYCERIN 50 MCG: 5 INJECTION, SOLUTION INTRAVENOUS at 09:02

## 2023-12-04 RX ADMIN — NITROGLYCERIN 10 MCG/MIN: 20 INJECTION INTRAVENOUS at 11:32

## 2023-12-04 RX ADMIN — Medication 50 MCG: at 08:27

## 2023-12-04 RX ADMIN — MIDAZOLAM 2 MG: 1 INJECTION INTRAMUSCULAR; INTRAVENOUS at 07:02

## 2023-12-04 RX ADMIN — Medication 50 MCG: at 09:41

## 2023-12-04 RX ADMIN — HYDROMORPHONE HYDROCHLORIDE 0.3 MG: 2 INJECTION, SOLUTION INTRAMUSCULAR; INTRAVENOUS; SUBCUTANEOUS at 10:47

## 2023-12-04 RX ADMIN — NITROGLYCERIN 50 MCG: 5 INJECTION, SOLUTION INTRAVENOUS at 08:37

## 2023-12-04 RX ADMIN — NITROGLYCERIN 50 MCG: 5 INJECTION, SOLUTION INTRAVENOUS at 10:19

## 2023-12-04 RX ADMIN — DEXAMETHASONE SODIUM PHOSPHATE 4 MG: 4 INJECTION, SOLUTION INTRAMUSCULAR; INTRAVENOUS at 07:34

## 2023-12-04 RX ADMIN — HYDROMORPHONE HYDROCHLORIDE 0.4 MG: 2 INJECTION, SOLUTION INTRAMUSCULAR; INTRAVENOUS; SUBCUTANEOUS at 10:43

## 2023-12-04 RX ADMIN — Medication 4 PUFF: at 09:52

## 2023-12-04 ASSESSMENT — PAIN SCALES - GENERAL
PAINLEVEL_OUTOF10: 0
PAINLEVEL_OUTOF10: 10
PAINLEVEL_OUTOF10: 0

## 2023-12-04 ASSESSMENT — PAIN - FUNCTIONAL ASSESSMENT: PAIN_FUNCTIONAL_ASSESSMENT: 0-10

## 2023-12-04 ASSESSMENT — COPD QUESTIONNAIRES: CAT_SEVERITY: NO INTERVAL CHANGE

## 2023-12-04 ASSESSMENT — LIFESTYLE VARIABLES: SMOKING_STATUS: 1

## 2023-12-04 ASSESSMENT — ENCOUNTER SYMPTOMS: SHORTNESS OF BREATH: 1

## 2023-12-04 NOTE — BRIEF OP NOTE
Brief Postoperative Note      Patient: Rosa Logan  YOB: 1965  MRN: [id number]    Date of Procedure: 12/4/2023    Pre-Op Diagnosis Codes:     * Infrarenal abdominal aortic aneurysm (AAA) without rupture ([address]) [I71.43]    Post-Op Diagnosis: Same       Procedure(s):  OPEN ABDOMINAL AORTIC ANEURYSM REPAIR,  RETROPERONEAL APPROACH, LEFT RENAL ARTERY IMPLANTATION    Surgeon(s):  Macey Rock MD    Assistant:  Surgical Assistant: Abida Mayberry    Anesthesia: General    Estimated Blood Loss (mL): 164VW    Complications: None    Specimens:   * No specimens in log *    Implants:  Implant Name Type Inv.  Item Serial No.  Lot No. LRB No. Used Action   GRAFT VSCLR WOVEN DBLE VLR BFRCTD 20 MM X 10 CM JOHNNY X 40 CM - [id number] Vascular grafts GRAFT VSCLR WOVEN DBLE VLR BFRCTD 20 MM X 10 CM JOHNNY X 40 CM [id number] [employer]- [id number] Left 1 Implanted         Drains:   Urinary Catheter 12/04/23 Erickson-Temperature (Active)             Electronically signed by Macey Rock MD on 12/4/2023 at 10:28 AM

## 2023-12-04 NOTE — ANESTHESIA PRE PROCEDURE
12/04/2023 06:10 AM    RBC 4.36 12/04/2023 06:10 AM    HGB 15.0 12/04/2023 06:10 AM    HCT 43.4 12/04/2023 06:10 AM    MCV 99.5 12/04/2023 06:10 AM    RDW 13.1 12/04/2023 06:10 AM     12/04/2023 06:10 AM       CMP:   Lab Results   Component Value Date/Time     10/18/2023 08:05 AM    K 4.3 10/18/2023 08:05 AM    CL 97 10/18/2023 08:05 AM    CO2 25 10/18/2023 08:05 AM    BUN 25 10/18/2023 08:05 AM    CREATININE 1.3 10/18/2023 08:05 AM    GFRAA >60 06/10/2021 10:17 AM    GFRAA >60 06/08/2012 09:24 AM    AGRATIO 1.4 06/10/2021 10:17 AM    LABGLOM >60 10/18/2023 08:05 AM    GLUCOSE 114 10/18/2023 08:05 AM    PROT 7.7 06/10/2021 10:17 AM    PROT 7.3 06/08/2012 09:24 AM    CALCIUM 9.4 10/18/2023 08:05 AM    BILITOT 1.1 06/10/2021 10:17 AM    ALKPHOS 66 06/10/2021 10:17 AM    AST 23 06/10/2021 10:17 AM    ALT 19 06/10/2021 10:17 AM       POC Tests: No results for input(s): \"POCGLU\", \"POCNA\", \"POCK\", \"POCCL\", \"POCBUN\", \"POCHEMO\", \"POCHCT\" in the last 72 hours.     Coags: No results found for: \"PROTIME\", \"INR\", \"APTT\"    HCG (If Applicable): No results found for: \"PREGTESTUR\", \"PREGSERUM\", \"HCG\", \"HCGQUANT\"     ABGs: No results found for: \"PHART\", \"PO2ART\", \"EYC4UOW\", \"YRO9CWV\", \"BEART\", \"J0NEGALM\"     Type & Screen (If Applicable):  No results found for: \"LABABO\", \"LABRH\"    Drug/Infectious Status (If Applicable):  No results found for: \"HIV\", \"HEPCAB\"    COVID-19 Screening (If Applicable): No results found for: \"COVID19\"        Anesthesia Evaluation     no history of anesthetic complications:   Airway: Mallampati: II  TM distance: >3 FB   Neck ROM: full  Mouth opening: > = 3 FB   Dental:    (+) poor dentition  Comment: Has multiple loose teeth (\"most of them\"); discussed and understanding of risks to remove teeth    Pulmonary:   (+)  COPD: no interval change,  shortness of breath: no interval change,     decreased breath sounds    current smoker                           Cardiovascular:    (+) hypertension:,

## 2023-12-04 NOTE — ANESTHESIA PROCEDURE NOTES
Central Venous Line:    A central venous line was placed using ultrasound guidance, in the OR for the following indication(s): central venous access and CVP monitoring. 12/4/2023 7:25 AM    Sterility preparation included the following: hand hygiene performed prior to procedure, maximum sterile barriers used and sterile technique used to drape from head to toe. The patient was placed in Trendelenburg position. The right internal jugular vein was prepped. The site was prepped with Chloraprep. A 7 Fr (size), 16 (length), triple lumen was placed. During the procedure, the following specific steps were taken: target vein identified, needle advanced into vein and blood aspirated and guidewire advanced into vein. During the procedure the patient experienced: patient tolerated procedure well with no complications.       Outcomes: uncomplicated and patient tolerated procedure well  Real-time US image taken/store: yes  Anesthesia type: general..No  Staffing  Performed: Anesthesiologist   Anesthesiologist: Espinoza Lynn MD  Performed by: Espinoza Lynn MD  Authorized by: Espinoza Lynn MD    Preanesthetic Checklist  Completed: patient identified, IV checked, site marked, risks and benefits discussed, surgical/procedural consents, equipment checked, pre-op evaluation, timeout performed, anesthesia consent given, oxygen available, monitors applied/VS acknowledged, fire risk safety assessment completed and verbalized and blood product R/B/A discussed and consented

## 2023-12-04 NOTE — H&P
Outpatient Follow Up Note    Héctor Lewis is 59 y.o. male who presents today for AAA repair. Past Medical History:   Diagnosis Date    Abdominal aortic aneurysm (AAA) ([address])     Anxiety 11/01/2012    PSYCH DR Samy Osorio [phone], PANIC DISORDER    Arthritis     viv hands    Bulging disc     LUMBAR    CAD (coronary artery disease)     COPD (chronic obstructive pulmonary disease) (HCC)     EMPHYSEMA    Dependent personality disorder (HCC)     PER PSYCH. HISTORY    Diverticulosis     Enlarged prostate     Hyperlipidemia     Hypertension     Leukocytosis     MACROCYTOSIS    MDD (major depressive disorder) 10/01/2012    discharged from [facility]. [facility] psych unit 10-5-12.     Neuropathy     viv LE, viv hands    Tobacco abuse     Weight loss 11/06/2012    30 LBS IN LAST YEAR R/T DEPRESSION       Past Surgical History:   Procedure Laterality Date    COLONOSCOPY      CYSTOSCOPY  2/27/2014    HERNIA REPAIR      BILAT GROIN    INGUINAL HERNIA REPAIR  11/14/12    REPAIR RECURRENT WITH MESH    UMBILICAL HERNIA REPAIR  3/7/14    with mesh    UPPER GASTROINTESTINAL ENDOSCOPY       Social History:    Social History     Tobacco Use   Smoking Status Every Day    Packs/day: 0.25    Years: 30.00    Additional pack years: 0.00    Total pack years: 7.50    Types: Cigarettes   Smokeless Tobacco Never     Current Medications:  Current Facility-Administered Medications   Medication Dose Route Frequency Provider Last Rate Last Admin    lidocaine PF 1 % injection 1 mL  1 mL IntraDERmal Once PRN Gerhardt Fairy, MD        lactated ringers IV soln infusion   IntraVENous Continuous Gerhardt Fairy, MD        sodium chloride flush 0.9 % injection 5-40 mL  5-40 mL IntraVENous 2 times per day Gerhardt Fairy, MD        sodium chloride flush 0.9 % injection 5-40 mL  5-40 mL IntraVENous PRN Gerhardt Fairy, MD        0.9 % sodium chloride infusion   IntraVENous PRN Gerhardt Fairy, MD        midazolam

## 2023-12-05 ENCOUNTER — APPOINTMENT (OUTPATIENT)
Dept: GENERAL RADIOLOGY | Age: 58
DRG: 169 | End: 2023-12-05
Attending: SURGERY
Payer: COMMERCIAL

## 2023-12-05 PROBLEM — J98.11 ATELECTASIS: Status: ACTIVE | Noted: 2023-12-05

## 2023-12-05 PROBLEM — F10.90 ALCOHOL USE: Status: ACTIVE | Noted: 2023-12-05

## 2023-12-05 PROBLEM — Z78.9 ALCOHOL USE: Status: ACTIVE | Noted: 2023-12-05

## 2023-12-05 PROBLEM — E66.3 OVERWEIGHT (BMI 25.0-29.9): Status: ACTIVE | Noted: 2023-12-05

## 2023-12-05 PROBLEM — D64.9 POSTOPERATIVE ANEMIA: Status: ACTIVE | Noted: 2023-12-05

## 2023-12-05 PROBLEM — J96.01 ACUTE RESPIRATORY FAILURE WITH HYPOXEMIA (HCC): Status: ACTIVE | Noted: 2023-12-05

## 2023-12-05 PROBLEM — J95.2 ACUTE POSTOPERATIVE PULMONARY INSUFFICIENCY (HCC): Status: ACTIVE | Noted: 2023-12-05

## 2023-12-05 PROBLEM — J98.6 ACQUIRED ELEVATED DIAPHRAGM: Status: ACTIVE | Noted: 2023-12-05

## 2023-12-05 PROBLEM — Z87.891 HISTORY OF SMOKING: Status: ACTIVE | Noted: 2023-12-05

## 2023-12-05 LAB
ANION GAP SERPL CALCULATED.3IONS-SCNC: 7 MMOL/L (ref 3–16)
BASE EXCESS BLDA CALC-SCNC: 6 MMOL/L (ref -3–3)
BUN SERPL-MCNC: 19 MG/DL (ref 7–20)
CALCIUM SERPL-MCNC: 7.6 MG/DL (ref 8.3–10.6)
CHLORIDE SERPL-SCNC: 105 MMOL/L (ref 99–110)
CO2 BLDA-SCNC: 33 MMOL/L
CO2 SERPL-SCNC: 29 MMOL/L (ref 21–32)
CREAT SERPL-MCNC: 1.1 MG/DL (ref 0.9–1.3)
DEPRECATED RDW RBC AUTO: 12.9 % (ref 12.4–15.4)
DEPRECATED RDW RBC AUTO: 13.1 % (ref 12.4–15.4)
GFR SERPLBLD CREATININE-BSD FMLA CKD-EPI: >60 ML/MIN/{1.73_M2}
GLUCOSE SERPL-MCNC: 115 MG/DL (ref 70–99)
HCO3 BLDA-SCNC: 31.6 MMOL/L (ref 21–29)
HCT VFR BLD AUTO: 32.7 % (ref 40.5–52.5)
HCT VFR BLD AUTO: 33.9 % (ref 40.5–52.5)
HGB BLD-MCNC: 11 G/DL (ref 13.5–17.5)
HGB BLD-MCNC: 11.3 G/DL (ref 13.5–17.5)
MCH RBC QN AUTO: 33.6 PG (ref 26–34)
MCH RBC QN AUTO: 34.1 PG (ref 26–34)
MCHC RBC AUTO-ENTMCNC: 33.2 G/DL (ref 31–36)
MCHC RBC AUTO-ENTMCNC: 33.8 G/DL (ref 31–36)
MCV RBC AUTO: 101 FL (ref 80–100)
MCV RBC AUTO: 101.3 FL (ref 80–100)
PCO2 BLDA: 61 MM HG (ref 35–45)
PERFORMED ON: ABNORMAL
PH BLDA: 7.32 [PH] (ref 7.35–7.45)
PLATELET # BLD AUTO: 178 K/UL (ref 135–450)
PLATELET # BLD AUTO: 197 K/UL (ref 135–450)
PMV BLD AUTO: 7.5 FL (ref 5–10.5)
PMV BLD AUTO: 8.2 FL (ref 5–10.5)
PO2 BLDA: 28.2 MM HG (ref 75–108)
POC SAMPLE TYPE: ABNORMAL
POTASSIUM SERPL-SCNC: 4.4 MMOL/L (ref 3.5–5.1)
RBC # BLD AUTO: 3.24 M/UL (ref 4.2–5.9)
RBC # BLD AUTO: 3.35 M/UL (ref 4.2–5.9)
SAO2 % BLDA: 46 % (ref 93–100)
SODIUM SERPL-SCNC: 141 MMOL/L (ref 136–145)
WBC # BLD AUTO: 7.3 K/UL (ref 4–11)
WBC # BLD AUTO: 8.4 K/UL (ref 4–11)

## 2023-12-05 PROCEDURE — 2700000000 HC OXYGEN THERAPY PER DAY

## 2023-12-05 PROCEDURE — 99291 CRITICAL CARE FIRST HOUR: CPT | Performed by: INTERNAL MEDICINE

## 2023-12-05 PROCEDURE — 36415 COLL VENOUS BLD VENIPUNCTURE: CPT

## 2023-12-05 PROCEDURE — 94640 AIRWAY INHALATION TREATMENT: CPT

## 2023-12-05 PROCEDURE — 94669 MECHANICAL CHEST WALL OSCILL: CPT

## 2023-12-05 PROCEDURE — 2500000003 HC RX 250 WO HCPCS: Performed by: CLINICAL NURSE SPECIALIST

## 2023-12-05 PROCEDURE — 6360000002 HC RX W HCPCS: Performed by: SURGERY

## 2023-12-05 PROCEDURE — 71045 X-RAY EXAM CHEST 1 VIEW: CPT

## 2023-12-05 PROCEDURE — 2580000003 HC RX 258: Performed by: SURGERY

## 2023-12-05 PROCEDURE — 6370000000 HC RX 637 (ALT 250 FOR IP): Performed by: INTERNAL MEDICINE

## 2023-12-05 PROCEDURE — 85027 COMPLETE CBC AUTOMATED: CPT

## 2023-12-05 PROCEDURE — 36592 COLLECT BLOOD FROM PICC: CPT

## 2023-12-05 PROCEDURE — 6360000002 HC RX W HCPCS: Performed by: INTERNAL MEDICINE

## 2023-12-05 PROCEDURE — 2000000000 HC ICU R&B

## 2023-12-05 PROCEDURE — 94761 N-INVAS EAR/PLS OXIMETRY MLT: CPT

## 2023-12-05 PROCEDURE — 82803 BLOOD GASES ANY COMBINATION: CPT

## 2023-12-05 PROCEDURE — 6370000000 HC RX 637 (ALT 250 FOR IP): Performed by: SURGERY

## 2023-12-05 PROCEDURE — 80048 BASIC METABOLIC PNL TOTAL CA: CPT

## 2023-12-05 RX ORDER — CLONAZEPAM 1 MG/1
1 TABLET ORAL 3 TIMES DAILY PRN
Status: DISCONTINUED | OUTPATIENT
Start: 2023-12-05 | End: 2023-12-11 | Stop reason: HOSPADM

## 2023-12-05 RX ORDER — IPRATROPIUM BROMIDE AND ALBUTEROL SULFATE 2.5; .5 MG/3ML; MG/3ML
1 SOLUTION RESPIRATORY (INHALATION)
Status: DISCONTINUED | OUTPATIENT
Start: 2023-12-05 | End: 2023-12-06

## 2023-12-05 RX ORDER — FUROSEMIDE 10 MG/ML
20 INJECTION INTRAMUSCULAR; INTRAVENOUS ONCE
Status: COMPLETED | OUTPATIENT
Start: 2023-12-05 | End: 2023-12-05

## 2023-12-05 RX ORDER — ACETAMINOPHEN 325 MG/1
650 TABLET ORAL EVERY 6 HOURS PRN
Status: DISCONTINUED | OUTPATIENT
Start: 2023-12-05 | End: 2023-12-05

## 2023-12-05 RX ORDER — ACETAMINOPHEN 325 MG/1
650 TABLET ORAL EVERY 6 HOURS PRN
Status: DISCONTINUED | OUTPATIENT
Start: 2023-12-05 | End: 2023-12-11 | Stop reason: HOSPADM

## 2023-12-05 RX ORDER — ACETYLCYSTEINE 200 MG/ML
600 SOLUTION ORAL; RESPIRATORY (INHALATION)
Status: DISCONTINUED | OUTPATIENT
Start: 2023-12-05 | End: 2023-12-11 | Stop reason: HOSPADM

## 2023-12-05 RX ADMIN — ACETYLCYSTEINE 600 MG: 200 INHALANT RESPIRATORY (INHALATION) at 19:31

## 2023-12-05 RX ADMIN — CLONAZEPAM 1 MG: 1 TABLET ORAL at 09:43

## 2023-12-05 RX ADMIN — SODIUM CHLORIDE, POTASSIUM CHLORIDE, SODIUM LACTATE AND CALCIUM CHLORIDE: 600; 310; 30; 20 INJECTION, SOLUTION INTRAVENOUS at 18:28

## 2023-12-05 RX ADMIN — METOPROLOL TARTRATE 5 MG: 5 INJECTION INTRAVENOUS at 11:10

## 2023-12-05 RX ADMIN — IPRATROPIUM BROMIDE AND ALBUTEROL SULFATE 1 DOSE: 2.5; .5 SOLUTION RESPIRATORY (INHALATION) at 15:32

## 2023-12-05 RX ADMIN — MORPHINE SULFATE: 1 INJECTION INTRAVENOUS at 00:05

## 2023-12-05 RX ADMIN — Medication 10 ML: at 09:44

## 2023-12-05 RX ADMIN — METOPROLOL TARTRATE 5 MG: 5 INJECTION INTRAVENOUS at 16:58

## 2023-12-05 RX ADMIN — FUROSEMIDE 20 MG: 10 INJECTION, SOLUTION INTRAMUSCULAR; INTRAVENOUS at 09:43

## 2023-12-05 RX ADMIN — CLONAZEPAM 1 MG: 1 TABLET ORAL at 18:53

## 2023-12-05 RX ADMIN — METOPROLOL TARTRATE 5 MG: 5 INJECTION INTRAVENOUS at 22:36

## 2023-12-05 RX ADMIN — ACETAMINOPHEN 650 MG: 325 TABLET ORAL at 17:34

## 2023-12-05 RX ADMIN — IPRATROPIUM BROMIDE AND ALBUTEROL SULFATE 1 DOSE: 2.5; .5 SOLUTION RESPIRATORY (INHALATION) at 19:31

## 2023-12-05 RX ADMIN — Medication 10 ML: at 22:36

## 2023-12-05 ASSESSMENT — PAIN SCALES - GENERAL
PAINLEVEL_OUTOF10: 0
PAINLEVEL_OUTOF10: 10
PAINLEVEL_OUTOF10: 0
PAINLEVEL_OUTOF10: 10
PAINLEVEL_OUTOF10: 10
PAINLEVEL_OUTOF10: 0
PAINLEVEL_OUTOF10: 10
PAINLEVEL_OUTOF10: 4

## 2023-12-05 NOTE — RT PROTOCOL NOTE
medication at home then do not decrease Frequency below that used at home. 0-3 - enter or revise RT bronchodilator order(s) to equivalent RT Bronchodilator order with Frequency of every 4 hours PRN for wheezing or increased work of breathing using Per Protocol order mode. 4-6 - enter or revise RT Bronchodilator order(s) to two equivalent RT bronchodilator orders with one order with BID Frequency and one order with Frequency of every 4 hours PRN wheezing or increased work of breathing using Per Protocol order mode. 7-10 - enter or revise RT Bronchodilator order(s) to two equivalent RT bronchodilator orders with one order with TID Frequency and one order with Frequency of every 4 hours PRN wheezing or increased work of breathing using Per Protocol order mode. 11-13 - enter or revise RT Bronchodilator order(s) to one equivalent RT bronchodilator order with QID Frequency and an Albuterol order with Frequency of every 4 hours PRN wheezing or increased work of breathing using Per Protocol order mode. Greater than 13 - enter or revise RT Bronchodilator order(s) to one equivalent RT bronchodilator order with every 4 hours Frequency and an Albuterol order with Frequency of every 2 hours PRN wheezing or increased work of breathing using Per Protocol order mode. RT to enter RT Home Evaluation for COPD & MDI Assessment order using Per Protocol order mode.     Electronically signed by Lissett Castillo RCP on 12/5/2023 at 5:01 PM

## 2023-12-05 NOTE — ACP (ADVANCE CARE PLANNING)
Advance Care Planning     General Advance Care Planning (ACP) Conversation    Date of Conversation: 11/21/2023  Conducted with: Patient with Decision Making Capacity    Healthcare Decision Maker:  No healthcare decision makers have been documented. Click here to complete [address] including selection of the Healthcare Decision Maker Relationship (ie \"Primary\")   Today we documented Decision Maker(s) consistent with Legal Next of Kin hierarchy. Content/Action Overview:   Has ACP document(s) on file - reflects the patient's care preferences  Reviewed DNR/DNI and patient elects Full Code (Attempt Resuscitation)        Length of Voluntary ACP Conversation in minutes:  <16 minutes (Non-Billable)    Xu Olvera RN

## 2023-12-05 NOTE — OP NOTE
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 POSTOPERATIVE DIAGNOSIS:  Juxtarenal abdominal aortic aneurysm with  right iliac artery aneurysm. PROCEDURE PERFORMED:  Abdominal aortic aneurysm repair using  aortobiiliac graft performed via a retroperitoneal approach. SURGEON:  Roland Reyna MD    ANESTHESIA:  General endotracheal.    INDICATIONS:  The patient is a 24-year-old male with large abdominal  aortic aneurysm abutting the renal arteries. Posterior to the renal  artery, there was an outpouching of the aorta. I felt that the safest  way to completely repair the aorta and have a sound proximal anastomosis  would be through a lateral retroperitoneal approach. OPERATIVE PROCEDURE:  The patient was brought to the operating room and  placed in supine position. General endotracheal anesthesia was induced. After adequate anesthesia, the patient was turned in the right lateral  decubitus position, positioned on a beanbag splint. The chest, abdomen  and pelvis were then prepped and draped in a sterile fashion. An  oblique incision was made coursing through approximately the tenth  interspace. The abdominal wall muscle was divided using a cautery. I  did split the diaphragm slightly so that the ribs could be spread for  better retraction. The retroperitoneal plane was developed and the left  kidney was reflected anteriorly.   Dissected down to the aortic  bifurcation and both iliac arteries down to just above the

## 2023-12-05 NOTE — CARE COORDINATION
Case Management Assessment  Initial Evaluation    Date/Time of Evaluation: 12/5/2023 12:56 PM  Assessment Completed by: Deloris Heaton RN    If patient is discharged prior to next notation, then this note serves as note for discharge by case management. Patient Name: Heike Odonnell                   YOB: 1965  Diagnosis: Aneurysm of infrarenal abdominal aorta (HCC) [I71.43]  Infrarenal abdominal aortic aneurysm (AAA) without rupture (HCC) [I71.43]  AAA (abdominal aortic aneurysm) without rupture ([address]) [I71.40]                   Date / Time: 12/4/2023  5:31 AM    Patient Admission Status: Inpatient   Readmission Risk (Low < 19, Mod (19-27), High > 27): Readmission Risk Score: 11.2    Current PCP: Fatoumata Gama MD  PCP verified by CM? Yes    Chart Reviewed: Yes      History Provided by: Patient  Patient Orientation: Alert and Oriented, Person, Place, Situation, Self    Patient Cognition: Alert    Hospitalization in the last 30 days (Readmission):  No    If yes, Readmission Assessment in  Navigator will be completed. Advance Directives:      Code Status: Full Code   Patient's Primary Decision Maker is: Named in [facility]      Discharge Planning:    Patient lives with: Other (Comment) (sister - hugo) Type of Home: House  Primary Care Giver: Self  Patient Support Systems include: Family Members (lives w/ sister)   Current Financial resources: Medicaid  Current community resources: None  Current services prior to admission: None            Current DME:              Type of Home Care services:  None    ADLS  Prior functional level: Independent in ADLs/IADLs  Current functional level: Assistance with the following:, Bathing, Dressing, Mobility    PT AM-PAC:   /24  OT AM-PAC:   /24    Family can provide assistance at DC: Yes  Would you like Case Management to discuss the discharge plan with any other family members/significant others, and if so, who?  No  Plans to Return to

## 2023-12-06 ENCOUNTER — TELEPHONE (OUTPATIENT)
Dept: VASCULAR SURGERY | Age: 58
End: 2023-12-06

## 2023-12-06 ENCOUNTER — APPOINTMENT (OUTPATIENT)
Dept: GENERAL RADIOLOGY | Age: 58
DRG: 169 | End: 2023-12-06
Attending: SURGERY
Payer: COMMERCIAL

## 2023-12-06 PROBLEM — R09.89 CHEST CONGESTION: Status: ACTIVE | Noted: 2023-12-06

## 2023-12-06 PROBLEM — R06.89 INEFFECTIVE AIRWAY CLEARANCE: Status: ACTIVE | Noted: 2023-12-06

## 2023-12-06 LAB
ANION GAP SERPL CALCULATED.3IONS-SCNC: 3 MMOL/L (ref 3–16)
BUN SERPL-MCNC: 14 MG/DL (ref 7–20)
CALCIUM SERPL-MCNC: 7.6 MG/DL (ref 8.3–10.6)
CHLORIDE SERPL-SCNC: 104 MMOL/L (ref 99–110)
CO2 SERPL-SCNC: 32 MMOL/L (ref 21–32)
CREAT SERPL-MCNC: 0.9 MG/DL (ref 0.9–1.3)
DEPRECATED RDW RBC AUTO: 13.1 % (ref 12.4–15.4)
EKG ATRIAL RATE: 94 BPM
EKG DIAGNOSIS: NORMAL
EKG P AXIS: 53 DEGREES
EKG P-R INTERVAL: 144 MS
EKG Q-T INTERVAL: 330 MS
EKG QRS DURATION: 82 MS
EKG QTC CALCULATION (BAZETT): 412 MS
EKG R AXIS: 15 DEGREES
EKG T AXIS: 0 DEGREES
EKG VENTRICULAR RATE: 94 BPM
GFR SERPLBLD CREATININE-BSD FMLA CKD-EPI: >60 ML/MIN/{1.73_M2}
GLUCOSE SERPL-MCNC: 107 MG/DL (ref 70–99)
HCT VFR BLD AUTO: 28.3 % (ref 40.5–52.5)
HGB BLD-MCNC: 9.8 G/DL (ref 13.5–17.5)
MCH RBC QN AUTO: 34.5 PG (ref 26–34)
MCHC RBC AUTO-ENTMCNC: 34.7 G/DL (ref 31–36)
MCV RBC AUTO: 99.7 FL (ref 80–100)
PLATELET # BLD AUTO: 152 K/UL (ref 135–450)
PMV BLD AUTO: 7.1 FL (ref 5–10.5)
POTASSIUM SERPL-SCNC: 4.2 MMOL/L (ref 3.5–5.1)
RBC # BLD AUTO: 2.84 M/UL (ref 4.2–5.9)
SODIUM SERPL-SCNC: 139 MMOL/L (ref 136–145)
WBC # BLD AUTO: 6.9 K/UL (ref 4–11)

## 2023-12-06 PROCEDURE — 93010 ELECTROCARDIOGRAM REPORT: CPT | Performed by: INTERNAL MEDICINE

## 2023-12-06 PROCEDURE — 6370000000 HC RX 637 (ALT 250 FOR IP): Performed by: SURGERY

## 2023-12-06 PROCEDURE — 6360000002 HC RX W HCPCS: Performed by: INTERNAL MEDICINE

## 2023-12-06 PROCEDURE — 93005 ELECTROCARDIOGRAM TRACING: CPT | Performed by: CLINICAL NURSE SPECIALIST

## 2023-12-06 PROCEDURE — 2000000000 HC ICU R&B

## 2023-12-06 PROCEDURE — 6360000002 HC RX W HCPCS: Performed by: NURSE PRACTITIONER

## 2023-12-06 PROCEDURE — 85027 COMPLETE CBC AUTOMATED: CPT

## 2023-12-06 PROCEDURE — 97163 PT EVAL HIGH COMPLEX 45 MIN: CPT

## 2023-12-06 PROCEDURE — 97110 THERAPEUTIC EXERCISES: CPT

## 2023-12-06 PROCEDURE — 97166 OT EVAL MOD COMPLEX 45 MIN: CPT

## 2023-12-06 PROCEDURE — 97530 THERAPEUTIC ACTIVITIES: CPT

## 2023-12-06 PROCEDURE — 6360000002 HC RX W HCPCS: Performed by: SURGERY

## 2023-12-06 PROCEDURE — 6370000000 HC RX 637 (ALT 250 FOR IP): Performed by: INTERNAL MEDICINE

## 2023-12-06 PROCEDURE — 94640 AIRWAY INHALATION TREATMENT: CPT

## 2023-12-06 PROCEDURE — 2700000000 HC OXYGEN THERAPY PER DAY

## 2023-12-06 PROCEDURE — 71045 X-RAY EXAM CHEST 1 VIEW: CPT

## 2023-12-06 PROCEDURE — 99291 CRITICAL CARE FIRST HOUR: CPT | Performed by: INTERNAL MEDICINE

## 2023-12-06 PROCEDURE — 94669 MECHANICAL CHEST WALL OSCILL: CPT

## 2023-12-06 PROCEDURE — 2580000003 HC RX 258: Performed by: SURGERY

## 2023-12-06 PROCEDURE — 97535 SELF CARE MNGMENT TRAINING: CPT

## 2023-12-06 PROCEDURE — 80048 BASIC METABOLIC PNL TOTAL CA: CPT

## 2023-12-06 PROCEDURE — 94761 N-INVAS EAR/PLS OXIMETRY MLT: CPT

## 2023-12-06 PROCEDURE — 6370000000 HC RX 637 (ALT 250 FOR IP): Performed by: CLINICAL NURSE SPECIALIST

## 2023-12-06 PROCEDURE — 2500000003 HC RX 250 WO HCPCS: Performed by: CLINICAL NURSE SPECIALIST

## 2023-12-06 PROCEDURE — 6360000002 HC RX W HCPCS: Performed by: CLINICAL NURSE SPECIALIST

## 2023-12-06 RX ORDER — ENOXAPARIN SODIUM 100 MG/ML
40 INJECTION SUBCUTANEOUS DAILY
Status: DISCONTINUED | OUTPATIENT
Start: 2023-12-06 | End: 2023-12-11 | Stop reason: HOSPADM

## 2023-12-06 RX ORDER — LEVALBUTEROL 1.25 MG/.5ML
0.63 SOLUTION, CONCENTRATE RESPIRATORY (INHALATION) EVERY 6 HOURS
Status: DISCONTINUED | OUTPATIENT
Start: 2023-12-06 | End: 2023-12-06

## 2023-12-06 RX ORDER — METOPROLOL TARTRATE 1 MG/ML
5 INJECTION, SOLUTION INTRAVENOUS EVERY 6 HOURS
Status: DISCONTINUED | OUTPATIENT
Start: 2023-12-06 | End: 2023-12-08

## 2023-12-06 RX ORDER — METOPROLOL TARTRATE 1 MG/ML
5 INJECTION, SOLUTION INTRAVENOUS ONCE
Status: COMPLETED | OUTPATIENT
Start: 2023-12-06 | End: 2023-12-06

## 2023-12-06 RX ORDER — POTASSIUM CHLORIDE 20 MEQ/1
20 TABLET, EXTENDED RELEASE ORAL ONCE
Status: COMPLETED | OUTPATIENT
Start: 2023-12-06 | End: 2023-12-06

## 2023-12-06 RX ORDER — MORPHINE SULFATE 4 MG/ML
4 INJECTION, SOLUTION INTRAMUSCULAR; INTRAVENOUS
Status: DISCONTINUED | OUTPATIENT
Start: 2023-12-06 | End: 2023-12-11

## 2023-12-06 RX ORDER — LEVALBUTEROL 1.25 MG/.5ML
0.63 SOLUTION, CONCENTRATE RESPIRATORY (INHALATION)
Status: DISCONTINUED | OUTPATIENT
Start: 2023-12-07 | End: 2023-12-11 | Stop reason: HOSPADM

## 2023-12-06 RX ORDER — MORPHINE SULFATE 2 MG/ML
2 INJECTION, SOLUTION INTRAMUSCULAR; INTRAVENOUS
Status: DISCONTINUED | OUTPATIENT
Start: 2023-12-06 | End: 2023-12-11

## 2023-12-06 RX ORDER — CLONAZEPAM 1 MG/1
1 TABLET ORAL 3 TIMES DAILY
Status: ON HOLD | COMMUNITY

## 2023-12-06 RX ORDER — OXYCODONE HYDROCHLORIDE 5 MG/1
5 TABLET ORAL EVERY 4 HOURS PRN
Status: DISCONTINUED | OUTPATIENT
Start: 2023-12-06 | End: 2023-12-11 | Stop reason: HOSPADM

## 2023-12-06 RX ORDER — LISINOPRIL AND HYDROCHLOROTHIAZIDE 20; 12.5 MG/1; MG/1
1 TABLET ORAL DAILY
Status: ON HOLD | COMMUNITY
End: 2023-12-11 | Stop reason: HOSPADM

## 2023-12-06 RX ORDER — EZETIMIBE 10 MG/1
10 TABLET ORAL DAILY
Status: ON HOLD | COMMUNITY

## 2023-12-06 RX ORDER — PANTOPRAZOLE SODIUM 40 MG/1
40 TABLET, DELAYED RELEASE ORAL
Status: DISCONTINUED | OUTPATIENT
Start: 2023-12-06 | End: 2023-12-11 | Stop reason: HOSPADM

## 2023-12-06 RX ORDER — CARVEDILOL 3.12 MG/1
3.12 TABLET ORAL 2 TIMES DAILY
Status: DISCONTINUED | OUTPATIENT
Start: 2023-12-06 | End: 2023-12-06

## 2023-12-06 RX ORDER — FUROSEMIDE 10 MG/ML
20 INJECTION INTRAMUSCULAR; INTRAVENOUS ONCE
Status: COMPLETED | OUTPATIENT
Start: 2023-12-06 | End: 2023-12-06

## 2023-12-06 RX ADMIN — POTASSIUM CHLORIDE 20 MEQ: 1500 TABLET, EXTENDED RELEASE ORAL at 17:54

## 2023-12-06 RX ADMIN — OXYCODONE 5 MG: 5 TABLET ORAL at 18:34

## 2023-12-06 RX ADMIN — Medication 10 ML: at 09:00

## 2023-12-06 RX ADMIN — OXYCODONE 5 MG: 5 TABLET ORAL at 14:54

## 2023-12-06 RX ADMIN — CLONAZEPAM 1 MG: 1 TABLET ORAL at 14:54

## 2023-12-06 RX ADMIN — Medication 10 ML: at 20:14

## 2023-12-06 RX ADMIN — OXYCODONE 5 MG: 5 TABLET ORAL at 09:35

## 2023-12-06 RX ADMIN — METOPROLOL TARTRATE 5 MG: 5 INJECTION INTRAVENOUS at 17:47

## 2023-12-06 RX ADMIN — METOPROLOL TARTRATE 5 MG: 5 INJECTION INTRAVENOUS at 11:14

## 2023-12-06 RX ADMIN — ACETAMINOPHEN 650 MG: 325 TABLET ORAL at 18:32

## 2023-12-06 RX ADMIN — MORPHINE SULFATE: 1 INJECTION INTRAVENOUS at 05:39

## 2023-12-06 RX ADMIN — PANTOPRAZOLE SODIUM 40 MG: 40 TABLET, DELAYED RELEASE ORAL at 08:58

## 2023-12-06 RX ADMIN — CLONAZEPAM 1 MG: 1 TABLET ORAL at 08:59

## 2023-12-06 RX ADMIN — ACETYLCYSTEINE 600 MG: 200 INHALANT RESPIRATORY (INHALATION) at 19:13

## 2023-12-06 RX ADMIN — IPRATROPIUM BROMIDE AND ALBUTEROL SULFATE 1 DOSE: 2.5; .5 SOLUTION RESPIRATORY (INHALATION) at 07:41

## 2023-12-06 RX ADMIN — FUROSEMIDE 20 MG: 10 INJECTION, SOLUTION INTRAMUSCULAR; INTRAVENOUS at 08:20

## 2023-12-06 RX ADMIN — FUROSEMIDE 20 MG: 10 INJECTION, SOLUTION INTRAMUSCULAR; INTRAVENOUS at 17:46

## 2023-12-06 RX ADMIN — ENOXAPARIN SODIUM 40 MG: 100 INJECTION SUBCUTANEOUS at 09:00

## 2023-12-06 RX ADMIN — LEVALBUTEROL 0.63 MG: 1.25 SOLUTION, CONCENTRATE RESPIRATORY (INHALATION) at 15:37

## 2023-12-06 RX ADMIN — METOPROLOL TARTRATE 5 MG: 5 INJECTION INTRAVENOUS at 05:42

## 2023-12-06 RX ADMIN — Medication 10 ML: at 20:13

## 2023-12-06 RX ADMIN — CLONAZEPAM 1 MG: 1 TABLET ORAL at 00:22

## 2023-12-06 RX ADMIN — MORPHINE SULFATE 2 MG: 2 INJECTION, SOLUTION INTRAMUSCULAR; INTRAVENOUS at 13:15

## 2023-12-06 RX ADMIN — LEVALBUTEROL 0.63 MG: 1.25 SOLUTION, CONCENTRATE RESPIRATORY (INHALATION) at 19:13

## 2023-12-06 RX ADMIN — ACETYLCYSTEINE 600 MG: 200 INHALANT RESPIRATORY (INHALATION) at 07:41

## 2023-12-06 RX ADMIN — METOPROLOL TARTRATE 5 MG: 5 INJECTION INTRAVENOUS at 23:36

## 2023-12-06 ASSESSMENT — PAIN - FUNCTIONAL ASSESSMENT
PAIN_FUNCTIONAL_ASSESSMENT: ACTIVITIES ARE NOT PREVENTED

## 2023-12-06 ASSESSMENT — PAIN SCALES - GENERAL
PAINLEVEL_OUTOF10: 6
PAINLEVEL_OUTOF10: 8
PAINLEVEL_OUTOF10: 2
PAINLEVEL_OUTOF10: 6
PAINLEVEL_OUTOF10: 7

## 2023-12-06 ASSESSMENT — PAIN DESCRIPTION - LOCATION
LOCATION: ABDOMEN;INCISION
LOCATION: ABDOMEN

## 2023-12-06 ASSESSMENT — PAIN SCALES - WONG BAKER
WONGBAKER_NUMERICALRESPONSE: 0
WONGBAKER_NUMERICALRESPONSE: 0

## 2023-12-06 ASSESSMENT — PAIN DESCRIPTION - DESCRIPTORS
DESCRIPTORS: ACHING;SORE
DESCRIPTORS: ACHING;SORE
DESCRIPTORS: DISCOMFORT;SORE

## 2023-12-06 NOTE — CARE COORDINATION
[facility] Acute Rehab Unit   After review, this patient is felt to be:       []  Appropriate for Acute Inpatient Rehab    []  Appropriate for Acute Inpatient Rehab Pending Insurance Authorization    []  Not appropriate for Acute Inpatient Rehab    [x]  Referral received and ARU reviewing patient; Evaluation ongoing. Dr. Jillian Bradley will round for final determination. Will notify DCP with further updates.  Thank you for the referral.  Nai Harrison RN

## 2023-12-06 NOTE — CARE COORDINATION
Spoke with Dr Giselle Ornelas - catalina to make ARU referral (F preferred)  Spoke with A ARU - they are aware and Dr Tram Espinoza can evaluate patient for appropriateness/criteria. This writer contacted Antonella Nicole (liaison)  [facility] ARU and made referral.    Will follow.     Kevin Tony RN

## 2023-12-06 NOTE — TELEPHONE ENCOUNTER
from [facility] called regarding pt. Pt is currently in   post AAA repair. Therapy has seen pt and recommended ARU. Pt prefers ARU, [facility]. Best callback number is [phone].       Please advise.

## 2023-12-06 NOTE — CARE COORDINATION
CM update note  - POD #2 AAA repair. Patient lives with sister Adeola Minor, however she works 2 jobs, and is gone most of the day. He would essentially be alone most of the time. Adeola Minor is concerned that Ti Lilly may need to get some rehab before discharge home. We discussed the potential  for IPR/ARU. If patient would need ARU, they prefer ARU at [facility], as they live very close to that site. Following.     Francisco J King RN

## 2023-12-07 ENCOUNTER — APPOINTMENT (OUTPATIENT)
Dept: GENERAL RADIOLOGY | Age: 58
DRG: 169 | End: 2023-12-07
Attending: SURGERY
Payer: COMMERCIAL

## 2023-12-07 LAB
ANION GAP SERPL CALCULATED.3IONS-SCNC: 8 MMOL/L (ref 3–16)
BUN SERPL-MCNC: 13 MG/DL (ref 7–20)
CALCIUM SERPL-MCNC: 7.9 MG/DL (ref 8.3–10.6)
CHLORIDE SERPL-SCNC: 100 MMOL/L (ref 99–110)
CO2 SERPL-SCNC: 29 MMOL/L (ref 21–32)
CREAT SERPL-MCNC: 1 MG/DL (ref 0.9–1.3)
DEPRECATED RDW RBC AUTO: 12.5 % (ref 12.4–15.4)
GFR SERPLBLD CREATININE-BSD FMLA CKD-EPI: >60 ML/MIN/{1.73_M2}
GLUCOSE SERPL-MCNC: 107 MG/DL (ref 70–99)
HCT VFR BLD AUTO: 26.2 % (ref 40.5–52.5)
HGB BLD-MCNC: 9.3 G/DL (ref 13.5–17.5)
MCH RBC QN AUTO: 35.4 PG (ref 26–34)
MCHC RBC AUTO-ENTMCNC: 35.4 G/DL (ref 31–36)
MCV RBC AUTO: 99.9 FL (ref 80–100)
PLATELET # BLD AUTO: 141 K/UL (ref 135–450)
PMV BLD AUTO: 7.5 FL (ref 5–10.5)
POTASSIUM SERPL-SCNC: 3.7 MMOL/L (ref 3.5–5.1)
RBC # BLD AUTO: 2.63 M/UL (ref 4.2–5.9)
SODIUM SERPL-SCNC: 137 MMOL/L (ref 136–145)
WBC # BLD AUTO: 8.1 K/UL (ref 4–11)

## 2023-12-07 PROCEDURE — 6360000002 HC RX W HCPCS: Performed by: SURGERY

## 2023-12-07 PROCEDURE — 97116 GAIT TRAINING THERAPY: CPT

## 2023-12-07 PROCEDURE — 94669 MECHANICAL CHEST WALL OSCILL: CPT

## 2023-12-07 PROCEDURE — 2500000003 HC RX 250 WO HCPCS: Performed by: CLINICAL NURSE SPECIALIST

## 2023-12-07 PROCEDURE — 2580000003 HC RX 258: Performed by: SURGERY

## 2023-12-07 PROCEDURE — 94640 AIRWAY INHALATION TREATMENT: CPT

## 2023-12-07 PROCEDURE — 2000000000 HC ICU R&B

## 2023-12-07 PROCEDURE — 6360000002 HC RX W HCPCS: Performed by: NURSE PRACTITIONER

## 2023-12-07 PROCEDURE — 85027 COMPLETE CBC AUTOMATED: CPT

## 2023-12-07 PROCEDURE — 6360000002 HC RX W HCPCS: Performed by: CLINICAL NURSE SPECIALIST

## 2023-12-07 PROCEDURE — 6370000000 HC RX 637 (ALT 250 FOR IP): Performed by: CLINICAL NURSE SPECIALIST

## 2023-12-07 PROCEDURE — 97110 THERAPEUTIC EXERCISES: CPT

## 2023-12-07 PROCEDURE — 6370000000 HC RX 637 (ALT 250 FOR IP): Performed by: INTERNAL MEDICINE

## 2023-12-07 PROCEDURE — 71045 X-RAY EXAM CHEST 1 VIEW: CPT

## 2023-12-07 PROCEDURE — 99233 SBSQ HOSP IP/OBS HIGH 50: CPT | Performed by: INTERNAL MEDICINE

## 2023-12-07 PROCEDURE — 6370000000 HC RX 637 (ALT 250 FOR IP): Performed by: SURGERY

## 2023-12-07 PROCEDURE — 94761 N-INVAS EAR/PLS OXIMETRY MLT: CPT

## 2023-12-07 PROCEDURE — 97530 THERAPEUTIC ACTIVITIES: CPT

## 2023-12-07 PROCEDURE — 2700000000 HC OXYGEN THERAPY PER DAY

## 2023-12-07 PROCEDURE — 80048 BASIC METABOLIC PNL TOTAL CA: CPT

## 2023-12-07 RX ORDER — FUROSEMIDE 10 MG/ML
20 INJECTION INTRAMUSCULAR; INTRAVENOUS ONCE
Status: COMPLETED | OUTPATIENT
Start: 2023-12-07 | End: 2023-12-07

## 2023-12-07 RX ORDER — POTASSIUM CHLORIDE 20 MEQ/1
20 TABLET, EXTENDED RELEASE ORAL 2 TIMES DAILY
Status: DISCONTINUED | OUTPATIENT
Start: 2023-12-07 | End: 2023-12-07

## 2023-12-07 RX ORDER — OXYCODONE HYDROCHLORIDE 5 MG/1
10 TABLET ORAL EVERY 4 HOURS PRN
Status: DISCONTINUED | OUTPATIENT
Start: 2023-12-07 | End: 2023-12-11

## 2023-12-07 RX ORDER — POTASSIUM CHLORIDE 20 MEQ/1
40 TABLET, EXTENDED RELEASE ORAL 2 TIMES DAILY
Status: DISCONTINUED | OUTPATIENT
Start: 2023-12-07 | End: 2023-12-08

## 2023-12-07 RX ORDER — LANOLIN ALCOHOL/MO/W.PET/CERES
6 CREAM (GRAM) TOPICAL NIGHTLY PRN
Status: DISCONTINUED | OUTPATIENT
Start: 2023-12-07 | End: 2023-12-11 | Stop reason: HOSPADM

## 2023-12-07 RX ADMIN — CLONAZEPAM 1 MG: 1 TABLET ORAL at 12:52

## 2023-12-07 RX ADMIN — FUROSEMIDE 20 MG: 10 INJECTION, SOLUTION INTRAMUSCULAR; INTRAVENOUS at 08:01

## 2023-12-07 RX ADMIN — ACETAMINOPHEN 650 MG: 325 TABLET ORAL at 08:01

## 2023-12-07 RX ADMIN — LEVALBUTEROL 0.63 MG: 1.25 SOLUTION, CONCENTRATE RESPIRATORY (INHALATION) at 07:41

## 2023-12-07 RX ADMIN — Medication 10 ML: at 20:49

## 2023-12-07 RX ADMIN — POTASSIUM CHLORIDE 40 MEQ: 1500 TABLET, EXTENDED RELEASE ORAL at 21:20

## 2023-12-07 RX ADMIN — OXYCODONE 10 MG: 5 TABLET ORAL at 12:52

## 2023-12-07 RX ADMIN — METOPROLOL TARTRATE 5 MG: 5 INJECTION INTRAVENOUS at 23:56

## 2023-12-07 RX ADMIN — PANTOPRAZOLE SODIUM 40 MG: 40 TABLET, DELAYED RELEASE ORAL at 08:00

## 2023-12-07 RX ADMIN — MORPHINE SULFATE 4 MG: 4 INJECTION, SOLUTION INTRAMUSCULAR; INTRAVENOUS at 03:09

## 2023-12-07 RX ADMIN — LEVALBUTEROL 0.63 MG: 1.25 SOLUTION, CONCENTRATE RESPIRATORY (INHALATION) at 13:38

## 2023-12-07 RX ADMIN — LEVALBUTEROL 0.63 MG: 1.25 SOLUTION, CONCENTRATE RESPIRATORY (INHALATION) at 20:43

## 2023-12-07 RX ADMIN — ACETYLCYSTEINE 600 MG: 200 INHALANT RESPIRATORY (INHALATION) at 07:41

## 2023-12-07 RX ADMIN — Medication 10 ML: at 08:00

## 2023-12-07 RX ADMIN — OXYCODONE 5 MG: 5 TABLET ORAL at 08:00

## 2023-12-07 RX ADMIN — METOPROLOL TARTRATE 5 MG: 5 INJECTION INTRAVENOUS at 06:44

## 2023-12-07 RX ADMIN — POTASSIUM CHLORIDE 20 MEQ: 1500 TABLET, EXTENDED RELEASE ORAL at 08:00

## 2023-12-07 RX ADMIN — METOPROLOL TARTRATE 5 MG: 5 INJECTION INTRAVENOUS at 18:03

## 2023-12-07 RX ADMIN — OXYCODONE 10 MG: 5 TABLET ORAL at 20:46

## 2023-12-07 RX ADMIN — ENOXAPARIN SODIUM 40 MG: 100 INJECTION SUBCUTANEOUS at 07:59

## 2023-12-07 RX ADMIN — ACETAMINOPHEN 650 MG: 325 TABLET ORAL at 16:38

## 2023-12-07 RX ADMIN — OXYCODONE 10 MG: 5 TABLET ORAL at 16:38

## 2023-12-07 RX ADMIN — ACETYLCYSTEINE 600 MG: 200 INHALANT RESPIRATORY (INHALATION) at 20:43

## 2023-12-07 RX ADMIN — METOPROLOL TARTRATE 5 MG: 5 INJECTION INTRAVENOUS at 12:53

## 2023-12-07 RX ADMIN — MORPHINE SULFATE 4 MG: 4 INJECTION, SOLUTION INTRAMUSCULAR; INTRAVENOUS at 07:00

## 2023-12-07 RX ADMIN — CLONAZEPAM 1 MG: 1 TABLET ORAL at 08:00

## 2023-12-07 RX ADMIN — ACETAMINOPHEN 650 MG: 325 TABLET ORAL at 03:09

## 2023-12-07 ASSESSMENT — PAIN SCALES - GENERAL
PAINLEVEL_OUTOF10: 7
PAINLEVEL_OUTOF10: 10
PAINLEVEL_OUTOF10: 5
PAINLEVEL_OUTOF10: 10
PAINLEVEL_OUTOF10: 8
PAINLEVEL_OUTOF10: 8
PAINLEVEL_OUTOF10: 4

## 2023-12-07 ASSESSMENT — PAIN - FUNCTIONAL ASSESSMENT
PAIN_FUNCTIONAL_ASSESSMENT: ACTIVITIES ARE NOT PREVENTED
PAIN_FUNCTIONAL_ASSESSMENT: PREVENTS OR INTERFERES SOME ACTIVE ACTIVITIES AND ADLS
PAIN_FUNCTIONAL_ASSESSMENT: PREVENTS OR INTERFERES SOME ACTIVE ACTIVITIES AND ADLS

## 2023-12-07 ASSESSMENT — PAIN DESCRIPTION - LOCATION
LOCATION: CHEST

## 2023-12-07 ASSESSMENT — PAIN DESCRIPTION - DESCRIPTORS
DESCRIPTORS: ACHING

## 2023-12-07 ASSESSMENT — PAIN DESCRIPTION - ORIENTATION
ORIENTATION: RIGHT
ORIENTATION: LEFT;MID
ORIENTATION: LEFT;MID

## 2023-12-07 NOTE — CARE COORDINATION
SPoke to Dong at [facility] ARU- they do have a bed available for the pt.  Will start pre cert tomorrow

## 2023-12-07 NOTE — CARE COORDINATION
LOS 3- Pt being followed by [facility],Pulm-  He is recommended for IPR- pref for [facility]- bed availability is tight. Pt will probably not be ready til Monday. Will call back tomorrow to  ARU for update.      Possible bronch today, chest tube to waterseal,  prn pain meds, advancing diet    Spoke with Geovanny Santos and she is okay if the pt can't get a bed at [facility] she is ok with staying here at [facility] ARU

## 2023-12-08 ENCOUNTER — APPOINTMENT (OUTPATIENT)
Dept: GENERAL RADIOLOGY | Age: 58
DRG: 169 | End: 2023-12-08
Attending: SURGERY
Payer: COMMERCIAL

## 2023-12-08 LAB
ANION GAP SERPL CALCULATED.3IONS-SCNC: 6 MMOL/L (ref 3–16)
BUN SERPL-MCNC: 12 MG/DL (ref 7–20)
CALCIUM SERPL-MCNC: 8.2 MG/DL (ref 8.3–10.6)
CHLORIDE SERPL-SCNC: 102 MMOL/L (ref 99–110)
CO2 SERPL-SCNC: 30 MMOL/L (ref 21–32)
CREAT SERPL-MCNC: 0.9 MG/DL (ref 0.9–1.3)
DEPRECATED RDW RBC AUTO: 12.6 % (ref 12.4–15.4)
GFR SERPLBLD CREATININE-BSD FMLA CKD-EPI: >60 ML/MIN/{1.73_M2}
GLUCOSE SERPL-MCNC: 107 MG/DL (ref 70–99)
HCT VFR BLD AUTO: 27.4 % (ref 40.5–52.5)
HGB BLD-MCNC: 9.5 G/DL (ref 13.5–17.5)
MCH RBC QN AUTO: 34.5 PG (ref 26–34)
MCHC RBC AUTO-ENTMCNC: 34.7 G/DL (ref 31–36)
MCV RBC AUTO: 99.7 FL (ref 80–100)
PLATELET # BLD AUTO: 176 K/UL (ref 135–450)
PMV BLD AUTO: 7.5 FL (ref 5–10.5)
POTASSIUM SERPL-SCNC: 4.4 MMOL/L (ref 3.5–5.1)
RBC # BLD AUTO: 2.75 M/UL (ref 4.2–5.9)
SODIUM SERPL-SCNC: 138 MMOL/L (ref 136–145)
WBC # BLD AUTO: 7.8 K/UL (ref 4–11)

## 2023-12-08 PROCEDURE — 94669 MECHANICAL CHEST WALL OSCILL: CPT

## 2023-12-08 PROCEDURE — 2000000000 HC ICU R&B

## 2023-12-08 PROCEDURE — 94761 N-INVAS EAR/PLS OXIMETRY MLT: CPT

## 2023-12-08 PROCEDURE — 97530 THERAPEUTIC ACTIVITIES: CPT

## 2023-12-08 PROCEDURE — 80048 BASIC METABOLIC PNL TOTAL CA: CPT

## 2023-12-08 PROCEDURE — A4217 STERILE WATER/SALINE, 500 ML: HCPCS | Performed by: INTERNAL MEDICINE

## 2023-12-08 PROCEDURE — 2500000003 HC RX 250 WO HCPCS: Performed by: CLINICAL NURSE SPECIALIST

## 2023-12-08 PROCEDURE — 2709999900 HC NON-CHARGEABLE SUPPLY: Performed by: INTERNAL MEDICINE

## 2023-12-08 PROCEDURE — 0BC18ZZ EXTIRPATION OF MATTER FROM TRACHEA, VIA NATURAL OR ARTIFICIAL OPENING ENDOSCOPIC: ICD-10-PCS | Performed by: INTERNAL MEDICINE

## 2023-12-08 PROCEDURE — 3609027000 HC BRONCHOSCOPY: Performed by: INTERNAL MEDICINE

## 2023-12-08 PROCEDURE — 2580000003 HC RX 258: Performed by: SURGERY

## 2023-12-08 PROCEDURE — 97110 THERAPEUTIC EXERCISES: CPT

## 2023-12-08 PROCEDURE — 99233 SBSQ HOSP IP/OBS HIGH 50: CPT | Performed by: INTERNAL MEDICINE

## 2023-12-08 PROCEDURE — 85027 COMPLETE CBC AUTOMATED: CPT

## 2023-12-08 PROCEDURE — 6370000000 HC RX 637 (ALT 250 FOR IP): Performed by: CLINICAL NURSE SPECIALIST

## 2023-12-08 PROCEDURE — 0BC58ZZ EXTIRPATION OF MATTER FROM RIGHT MIDDLE LOBE BRONCHUS, VIA NATURAL OR ARTIFICIAL OPENING ENDOSCOPIC: ICD-10-PCS | Performed by: INTERNAL MEDICINE

## 2023-12-08 PROCEDURE — 6370000000 HC RX 637 (ALT 250 FOR IP): Performed by: SURGERY

## 2023-12-08 PROCEDURE — 71045 X-RAY EXAM CHEST 1 VIEW: CPT

## 2023-12-08 PROCEDURE — 87070 CULTURE OTHR SPECIMN AEROBIC: CPT

## 2023-12-08 PROCEDURE — 6360000002 HC RX W HCPCS: Performed by: NURSE PRACTITIONER

## 2023-12-08 PROCEDURE — 6360000002 HC RX W HCPCS: Performed by: SURGERY

## 2023-12-08 PROCEDURE — 6360000002 HC RX W HCPCS: Performed by: CLINICAL NURSE SPECIALIST

## 2023-12-08 PROCEDURE — 2580000003 HC RX 258: Performed by: INTERNAL MEDICINE

## 2023-12-08 PROCEDURE — 2700000000 HC OXYGEN THERAPY PER DAY

## 2023-12-08 PROCEDURE — 0BC68ZZ EXTIRPATION OF MATTER FROM RIGHT LOWER LOBE BRONCHUS, VIA NATURAL OR ARTIFICIAL OPENING ENDOSCOPIC: ICD-10-PCS | Performed by: INTERNAL MEDICINE

## 2023-12-08 PROCEDURE — 6370000000 HC RX 637 (ALT 250 FOR IP): Performed by: INTERNAL MEDICINE

## 2023-12-08 PROCEDURE — 31645 BRNCHSC W/THER ASPIR 1ST: CPT | Performed by: INTERNAL MEDICINE

## 2023-12-08 PROCEDURE — 0B9F8ZX DRAINAGE OF RIGHT LOWER LUNG LOBE, VIA NATURAL OR ARTIFICIAL OPENING ENDOSCOPIC, DIAGNOSTIC: ICD-10-PCS | Performed by: INTERNAL MEDICINE

## 2023-12-08 PROCEDURE — 87205 SMEAR GRAM STAIN: CPT

## 2023-12-08 PROCEDURE — 87077 CULTURE AEROBIC IDENTIFY: CPT

## 2023-12-08 PROCEDURE — 6360000002 HC RX W HCPCS: Performed by: INTERNAL MEDICINE

## 2023-12-08 PROCEDURE — 94640 AIRWAY INHALATION TREATMENT: CPT

## 2023-12-08 PROCEDURE — 87185 SC STD ENZYME DETCJ PER NZM: CPT

## 2023-12-08 PROCEDURE — 97535 SELF CARE MNGMENT TRAINING: CPT

## 2023-12-08 PROCEDURE — 2500000003 HC RX 250 WO HCPCS: Performed by: INTERNAL MEDICINE

## 2023-12-08 RX ORDER — ATORVASTATIN CALCIUM 40 MG/1
40 TABLET, FILM COATED ORAL NIGHTLY
Status: DISCONTINUED | OUTPATIENT
Start: 2023-12-08 | End: 2023-12-11 | Stop reason: HOSPADM

## 2023-12-08 RX ORDER — FENTANYL CITRATE 50 UG/ML
INJECTION, SOLUTION INTRAMUSCULAR; INTRAVENOUS PRN
Status: DISCONTINUED | OUTPATIENT
Start: 2023-12-08 | End: 2023-12-08 | Stop reason: ALTCHOICE

## 2023-12-08 RX ORDER — MAGNESIUM HYDROXIDE 1200 MG/15ML
LIQUID ORAL CONTINUOUS PRN
Status: COMPLETED | OUTPATIENT
Start: 2023-12-08 | End: 2023-12-08

## 2023-12-08 RX ORDER — ACETYLCYSTEINE 200 MG/ML
SOLUTION ORAL; RESPIRATORY (INHALATION) PRN
Status: DISCONTINUED | OUTPATIENT
Start: 2023-12-08 | End: 2023-12-08 | Stop reason: ALTCHOICE

## 2023-12-08 RX ORDER — CLOPIDOGREL BISULFATE 75 MG/1
75 TABLET ORAL DAILY
Status: DISCONTINUED | OUTPATIENT
Start: 2023-12-08 | End: 2023-12-11 | Stop reason: HOSPADM

## 2023-12-08 RX ORDER — EZETIMIBE 10 MG/1
10 TABLET ORAL DAILY
Status: DISCONTINUED | OUTPATIENT
Start: 2023-12-08 | End: 2023-12-11 | Stop reason: HOSPADM

## 2023-12-08 RX ORDER — CARVEDILOL 3.12 MG/1
3.12 TABLET ORAL 2 TIMES DAILY
Status: DISCONTINUED | OUTPATIENT
Start: 2023-12-08 | End: 2023-12-11 | Stop reason: HOSPADM

## 2023-12-08 RX ORDER — LIDOCAINE HYDROCHLORIDE 20 MG/ML
INJECTION, SOLUTION INFILTRATION; PERINEURAL PRN
Status: DISCONTINUED | OUTPATIENT
Start: 2023-12-08 | End: 2023-12-08 | Stop reason: ALTCHOICE

## 2023-12-08 RX ORDER — MIDAZOLAM HYDROCHLORIDE 5 MG/ML
INJECTION INTRAMUSCULAR; INTRAVENOUS PRN
Status: DISCONTINUED | OUTPATIENT
Start: 2023-12-08 | End: 2023-12-08 | Stop reason: ALTCHOICE

## 2023-12-08 RX ADMIN — CLONAZEPAM 1 MG: 1 TABLET ORAL at 12:27

## 2023-12-08 RX ADMIN — ACETAMINOPHEN 650 MG: 325 TABLET ORAL at 07:25

## 2023-12-08 RX ADMIN — OXYCODONE 10 MG: 5 TABLET ORAL at 17:04

## 2023-12-08 RX ADMIN — CLONAZEPAM 1 MG: 1 TABLET ORAL at 17:04

## 2023-12-08 RX ADMIN — Medication 10 ML: at 21:32

## 2023-12-08 RX ADMIN — ATORVASTATIN CALCIUM 40 MG: 40 TABLET, FILM COATED ORAL at 21:31

## 2023-12-08 RX ADMIN — OXYCODONE 10 MG: 5 TABLET ORAL at 12:27

## 2023-12-08 RX ADMIN — EZETIMIBE 10 MG: 10 TABLET ORAL at 10:18

## 2023-12-08 RX ADMIN — OXYCODONE 10 MG: 5 TABLET ORAL at 21:32

## 2023-12-08 RX ADMIN — CARVEDILOL 3.12 MG: 3.12 TABLET, FILM COATED ORAL at 21:31

## 2023-12-08 RX ADMIN — ACETYLCYSTEINE 600 MG: 200 INHALANT RESPIRATORY (INHALATION) at 20:42

## 2023-12-08 RX ADMIN — PANTOPRAZOLE SODIUM 40 MG: 40 TABLET, DELAYED RELEASE ORAL at 07:25

## 2023-12-08 RX ADMIN — ENOXAPARIN SODIUM 40 MG: 100 INJECTION SUBCUTANEOUS at 07:25

## 2023-12-08 RX ADMIN — METOPROLOL TARTRATE 5 MG: 5 INJECTION INTRAVENOUS at 05:09

## 2023-12-08 RX ADMIN — LEVALBUTEROL 0.63 MG: 1.25 SOLUTION, CONCENTRATE RESPIRATORY (INHALATION) at 20:42

## 2023-12-08 RX ADMIN — Medication 10 ML: at 07:25

## 2023-12-08 RX ADMIN — LEVALBUTEROL 0.63 MG: 1.25 SOLUTION, CONCENTRATE RESPIRATORY (INHALATION) at 14:23

## 2023-12-08 RX ADMIN — MORPHINE SULFATE 4 MG: 4 INJECTION, SOLUTION INTRAMUSCULAR; INTRAVENOUS at 05:09

## 2023-12-08 RX ADMIN — CLONAZEPAM 1 MG: 1 TABLET ORAL at 07:25

## 2023-12-08 RX ADMIN — CLOPIDOGREL BISULFATE 75 MG: 75 TABLET ORAL at 10:18

## 2023-12-08 RX ADMIN — OXYCODONE 10 MG: 5 TABLET ORAL at 07:25

## 2023-12-08 RX ADMIN — CARVEDILOL 3.12 MG: 3.12 TABLET, FILM COATED ORAL at 10:17

## 2023-12-08 ASSESSMENT — PAIN DESCRIPTION - DESCRIPTORS
DESCRIPTORS: ACHING
DESCRIPTORS: ACHING

## 2023-12-08 ASSESSMENT — PAIN SCALES - GENERAL
PAINLEVEL_OUTOF10: 8
PAINLEVEL_OUTOF10: 8
PAINLEVEL_OUTOF10: 10
PAINLEVEL_OUTOF10: 2
PAINLEVEL_OUTOF10: 2
PAINLEVEL_OUTOF10: 0
PAINLEVEL_OUTOF10: 2
PAINLEVEL_OUTOF10: 2

## 2023-12-08 ASSESSMENT — PAIN DESCRIPTION - ORIENTATION
ORIENTATION: MID
ORIENTATION: LEFT

## 2023-12-08 ASSESSMENT — PAIN - FUNCTIONAL ASSESSMENT
PAIN_FUNCTIONAL_ASSESSMENT: PREVENTS OR INTERFERES SOME ACTIVE ACTIVITIES AND ADLS
PAIN_FUNCTIONAL_ASSESSMENT: PREVENTS OR INTERFERES SOME ACTIVE ACTIVITIES AND ADLS

## 2023-12-08 ASSESSMENT — PAIN DESCRIPTION - PAIN TYPE: TYPE: ACUTE PAIN;SURGICAL PAIN

## 2023-12-08 ASSESSMENT — PAIN DESCRIPTION - FREQUENCY: FREQUENCY: CONTINUOUS

## 2023-12-08 ASSESSMENT — PAIN DESCRIPTION - LOCATION
LOCATION: CHEST
LOCATION: CHEST;ABDOMEN

## 2023-12-08 ASSESSMENT — PAIN DESCRIPTION - ONSET: ONSET: ON-GOING

## 2023-12-08 NOTE — CARE COORDINATION
LOS 4-  DC naqvi and chest tube- Pre cert is pending the pt to go to [address] tentatively Monday.

## 2023-12-09 ENCOUNTER — APPOINTMENT (OUTPATIENT)
Dept: GENERAL RADIOLOGY | Age: 58
DRG: 169 | End: 2023-12-09
Attending: SURGERY
Payer: COMMERCIAL

## 2023-12-09 PROBLEM — J14 PNEUMONIA OF BOTH LOWER LOBES DUE TO HAEMOPHILUS INFLUENZAE (HCC): Status: ACTIVE | Noted: 2023-12-09

## 2023-12-09 LAB
ANION GAP SERPL CALCULATED.3IONS-SCNC: 7 MMOL/L (ref 3–16)
BUN SERPL-MCNC: 13 MG/DL (ref 7–20)
CALCIUM SERPL-MCNC: 8.6 MG/DL (ref 8.3–10.6)
CHLORIDE SERPL-SCNC: 102 MMOL/L (ref 99–110)
CO2 SERPL-SCNC: 29 MMOL/L (ref 21–32)
CREAT SERPL-MCNC: 0.9 MG/DL (ref 0.9–1.3)
DEPRECATED RDW RBC AUTO: 12.6 % (ref 12.4–15.4)
GFR SERPLBLD CREATININE-BSD FMLA CKD-EPI: >60 ML/MIN/{1.73_M2}
GLUCOSE SERPL-MCNC: 117 MG/DL (ref 70–99)
HCT VFR BLD AUTO: 28 % (ref 40.5–52.5)
HGB BLD-MCNC: 9.8 G/DL (ref 13.5–17.5)
MCH RBC QN AUTO: 34.8 PG (ref 26–34)
MCHC RBC AUTO-ENTMCNC: 34.9 G/DL (ref 31–36)
MCV RBC AUTO: 99.6 FL (ref 80–100)
PLATELET # BLD AUTO: 222 K/UL (ref 135–450)
PMV BLD AUTO: 7.3 FL (ref 5–10.5)
POTASSIUM SERPL-SCNC: 4.5 MMOL/L (ref 3.5–5.1)
RBC # BLD AUTO: 2.81 M/UL (ref 4.2–5.9)
SODIUM SERPL-SCNC: 138 MMOL/L (ref 136–145)
WBC # BLD AUTO: 7.1 K/UL (ref 4–11)

## 2023-12-09 PROCEDURE — 6360000002 HC RX W HCPCS: Performed by: SURGERY

## 2023-12-09 PROCEDURE — 94669 MECHANICAL CHEST WALL OSCILL: CPT

## 2023-12-09 PROCEDURE — 94640 AIRWAY INHALATION TREATMENT: CPT

## 2023-12-09 PROCEDURE — 6370000000 HC RX 637 (ALT 250 FOR IP): Performed by: INTERNAL MEDICINE

## 2023-12-09 PROCEDURE — 6370000000 HC RX 637 (ALT 250 FOR IP): Performed by: SURGERY

## 2023-12-09 PROCEDURE — 71045 X-RAY EXAM CHEST 1 VIEW: CPT

## 2023-12-09 PROCEDURE — 2580000003 HC RX 258: Performed by: SURGERY

## 2023-12-09 PROCEDURE — 99233 SBSQ HOSP IP/OBS HIGH 50: CPT | Performed by: INTERNAL MEDICINE

## 2023-12-09 PROCEDURE — 36415 COLL VENOUS BLD VENIPUNCTURE: CPT

## 2023-12-09 PROCEDURE — 80048 BASIC METABOLIC PNL TOTAL CA: CPT

## 2023-12-09 PROCEDURE — 6360000002 HC RX W HCPCS: Performed by: INTERNAL MEDICINE

## 2023-12-09 PROCEDURE — 85027 COMPLETE CBC AUTOMATED: CPT

## 2023-12-09 PROCEDURE — 2000000000 HC ICU R&B

## 2023-12-09 PROCEDURE — 2700000000 HC OXYGEN THERAPY PER DAY

## 2023-12-09 PROCEDURE — 6370000000 HC RX 637 (ALT 250 FOR IP): Performed by: CLINICAL NURSE SPECIALIST

## 2023-12-09 PROCEDURE — 94761 N-INVAS EAR/PLS OXIMETRY MLT: CPT

## 2023-12-09 PROCEDURE — 6360000002 HC RX W HCPCS: Performed by: NURSE PRACTITIONER

## 2023-12-09 RX ORDER — SENNOSIDES A AND B 8.6 MG/1
1 TABLET, FILM COATED ORAL NIGHTLY
Status: DISCONTINUED | OUTPATIENT
Start: 2023-12-09 | End: 2023-12-11 | Stop reason: HOSPADM

## 2023-12-09 RX ORDER — LEVOFLOXACIN 5 MG/ML
750 INJECTION, SOLUTION INTRAVENOUS EVERY 24 HOURS
Status: DISCONTINUED | OUTPATIENT
Start: 2023-12-09 | End: 2023-12-11

## 2023-12-09 RX ORDER — DOCUSATE SODIUM 100 MG/1
100 CAPSULE, LIQUID FILLED ORAL 2 TIMES DAILY
Status: DISCONTINUED | OUTPATIENT
Start: 2023-12-09 | End: 2023-12-11 | Stop reason: HOSPADM

## 2023-12-09 RX ADMIN — Medication 10 ML: at 08:10

## 2023-12-09 RX ADMIN — OXYCODONE 10 MG: 5 TABLET ORAL at 08:08

## 2023-12-09 RX ADMIN — LEVOFLOXACIN 750 MG: 5 INJECTION, SOLUTION INTRAVENOUS at 12:30

## 2023-12-09 RX ADMIN — OXYCODONE 10 MG: 5 TABLET ORAL at 20:26

## 2023-12-09 RX ADMIN — LEVALBUTEROL 0.63 MG: 1.25 SOLUTION, CONCENTRATE RESPIRATORY (INHALATION) at 19:14

## 2023-12-09 RX ADMIN — ACETYLCYSTEINE 600 MG: 200 INHALANT RESPIRATORY (INHALATION) at 19:14

## 2023-12-09 RX ADMIN — CLOPIDOGREL BISULFATE 75 MG: 75 TABLET ORAL at 08:09

## 2023-12-09 RX ADMIN — CARVEDILOL 3.12 MG: 3.12 TABLET, FILM COATED ORAL at 20:26

## 2023-12-09 RX ADMIN — CLONAZEPAM 1 MG: 1 TABLET ORAL at 16:29

## 2023-12-09 RX ADMIN — ACETYLCYSTEINE 600 MG: 200 INHALANT RESPIRATORY (INHALATION) at 07:47

## 2023-12-09 RX ADMIN — LEVALBUTEROL 1.25 MG: 1.25 SOLUTION, CONCENTRATE RESPIRATORY (INHALATION) at 12:05

## 2023-12-09 RX ADMIN — LEVALBUTEROL 1.25 MG: 1.25 SOLUTION, CONCENTRATE RESPIRATORY (INHALATION) at 07:48

## 2023-12-09 RX ADMIN — CARVEDILOL 3.12 MG: 3.12 TABLET, FILM COATED ORAL at 08:09

## 2023-12-09 RX ADMIN — Medication 6 MG: at 20:23

## 2023-12-09 RX ADMIN — OXYCODONE 10 MG: 5 TABLET ORAL at 18:26

## 2023-12-09 RX ADMIN — OXYCODONE 10 MG: 5 TABLET ORAL at 14:20

## 2023-12-09 RX ADMIN — OXYCODONE 10 MG: 5 TABLET ORAL at 03:14

## 2023-12-09 RX ADMIN — DOCUSATE SODIUM 100 MG: 100 CAPSULE, LIQUID FILLED ORAL at 20:25

## 2023-12-09 RX ADMIN — SENNOSIDES 8.6 MG: 8.6 TABLET, FILM COATED ORAL at 20:25

## 2023-12-09 RX ADMIN — PANTOPRAZOLE SODIUM 40 MG: 40 TABLET, DELAYED RELEASE ORAL at 05:48

## 2023-12-09 RX ADMIN — ATORVASTATIN CALCIUM 40 MG: 40 TABLET, FILM COATED ORAL at 20:25

## 2023-12-09 RX ADMIN — DOCUSATE SODIUM 100 MG: 100 CAPSULE, LIQUID FILLED ORAL at 12:30

## 2023-12-09 RX ADMIN — Medication 10 ML: at 20:32

## 2023-12-09 RX ADMIN — ENOXAPARIN SODIUM 40 MG: 100 INJECTION SUBCUTANEOUS at 08:09

## 2023-12-09 RX ADMIN — CLONAZEPAM 1 MG: 1 TABLET ORAL at 08:08

## 2023-12-09 RX ADMIN — EZETIMIBE 10 MG: 10 TABLET ORAL at 08:09

## 2023-12-09 ASSESSMENT — PAIN DESCRIPTION - LOCATION
LOCATION: ABDOMEN;BACK
LOCATION: ABDOMEN
LOCATION: BACK;ABDOMEN

## 2023-12-09 ASSESSMENT — PAIN - FUNCTIONAL ASSESSMENT: PAIN_FUNCTIONAL_ASSESSMENT: PREVENTS OR INTERFERES SOME ACTIVE ACTIVITIES AND ADLS

## 2023-12-09 ASSESSMENT — PAIN SCALES - GENERAL
PAINLEVEL_OUTOF10: 10
PAINLEVEL_OUTOF10: 8
PAINLEVEL_OUTOF10: 3
PAINLEVEL_OUTOF10: 0
PAINLEVEL_OUTOF10: 10
PAINLEVEL_OUTOF10: 10
PAINLEVEL_OUTOF10: 4
PAINLEVEL_OUTOF10: 8

## 2023-12-09 ASSESSMENT — PAIN DESCRIPTION - DESCRIPTORS
DESCRIPTORS: DISCOMFORT;ACHING
DESCRIPTORS: ACHING;DISCOMFORT;SHARP
DESCRIPTORS: ACHING;DISCOMFORT
DESCRIPTORS: ACHING
DESCRIPTORS: ACHING;DISCOMFORT

## 2023-12-09 ASSESSMENT — PAIN SCALES - WONG BAKER
WONGBAKER_NUMERICALRESPONSE: 0

## 2023-12-09 ASSESSMENT — PAIN DESCRIPTION - PAIN TYPE
TYPE: SURGICAL PAIN
TYPE: SURGICAL PAIN

## 2023-12-09 ASSESSMENT — PAIN DESCRIPTION - ORIENTATION
ORIENTATION: LEFT

## 2023-12-09 ASSESSMENT — PAIN DESCRIPTION - ONSET: ONSET: ON-GOING

## 2023-12-09 ASSESSMENT — PAIN DESCRIPTION - FREQUENCY: FREQUENCY: CONTINUOUS

## 2023-12-09 NOTE — PROCEDURES
Bronchoscopy note    Patient with acute postop pulmonary insufficiency, chest congestion, mucous plugging, right lung collapse along with ineffective airway clearance not improving with conservative management and for that reason after informed consent, it was decided to do a bronchoscopy for therapeutic purposes and endoscopy team was requested to come to the bedside, after timeout patient was given oropharyngeal analgesia with benzocaine, patient was given lidocaine for tracheobronchial analgesia, patient was given conscious sedation with IV Versed and fentanyl, the bronchoscope was induced in the mouth using a bite block, patient was found to have normal vocal cords, patient had some mucous plug in the distal trachea which was suctioned out, patient had complete blockage of the right middle lobe and also multiple subsegments of the right lower lobe bronchus with thick mucous plugs which were quite viscous and tenacious, patient also had some friability of the mucus of the lining of the tracheobronchial tree, patient also had thick mucous plugs in the lingula, patient had some bronchomalacia but it was not significant, no endobronchial lesion, the bronchoscope was wedged into the right lower lobe bronchus and BAL was done from the area, patient had improvement in the patency of the airways after the bronchoscopy, bronchial washings sent for routine culture, patient tolerated the procedure well and did not have any apparent complications  Estimated blood loss was 0  For the management depending on patient clinical status and the bronchoscopy results    Efren Strauss MD

## 2023-12-09 NOTE — PRE SEDATION
Sedation Pre-Procedure Note    Patient Name: Graham Garces   YOB: 1965  Room/Bed: 0222/0222-01  Medical Record Number: [id number]  Date: 12/8/2023   Time: 9:57 PM       Indication: Persistent lung collapse/mucous plugging/chest congestion/ineffective airway clearance    Consent: I have discussed with the patient and/or the patient representative the indication, alternatives, and the possible risks and/or complications of the planned procedure and the anesthesia methods. The patient and/or patient representative appear to understand and agree to proceed. Vital Signs:   Vitals:    12/08/23 2131   BP: 135/74   Pulse: 97   Resp:    Temp:    SpO2:        Past Medical History:   has a past medical history of Abdominal aortic aneurysm (AAA) ([address]), Anxiety, Arthritis, Bulging disc, CAD (coronary artery disease), COPD (chronic obstructive pulmonary disease) ([address]), Dependent personality disorder ([address]), Diverticulosis, Enlarged prostate, History of percutaneous left heart catheterization, Hyperlipidemia, Hypertension, Leukocytosis, MDD (major depressive disorder), Neuropathy, Tobacco abuse, and Weight loss. Past Surgical History:   has a past surgical history that includes hernia repair; Colonoscopy; Upper gastrointestinal endoscopy; Inguinal hernia repair (11/14/12); Umbilical hernia repair (3/7/14); Cystocopy (2/27/2014); and Abdominal aortic aneurysm repair (Left, 12/4/2023).     Medications:   Scheduled Meds:    clopidogrel  75 mg Oral Daily    carvedilol  3.125 mg Oral BID    atorvastatin  40 mg Oral Nightly    ezetimibe  10 mg Oral Daily    pantoprazole  40 mg Oral QAM AC    enoxaparin  40 mg SubCUTAneous Daily    levalbuterol  0.63 mg Nebulization Q6H WA RT    acetylcysteine  600 mg Inhalation BID RT    sodium chloride flush  5-40 mL IntraVENous 2 times per day     Continuous Infusions:    sodium chloride       PRN Meds: oxyCODONE, melatonin, morphine **OR** morphine, oxyCODONE, clonazePAM,

## 2023-12-10 ENCOUNTER — APPOINTMENT (OUTPATIENT)
Dept: GENERAL RADIOLOGY | Age: 58
DRG: 169 | End: 2023-12-10
Attending: SURGERY
Payer: COMMERCIAL

## 2023-12-10 LAB
ANION GAP SERPL CALCULATED.3IONS-SCNC: 8 MMOL/L (ref 3–16)
BACTERIA SPEC RESP CULT: ABNORMAL
BACTERIA SPEC RESP CULT: ABNORMAL
BUN SERPL-MCNC: 13 MG/DL (ref 7–20)
CALCIUM SERPL-MCNC: 8.6 MG/DL (ref 8.3–10.6)
CHLORIDE SERPL-SCNC: 102 MMOL/L (ref 99–110)
CO2 SERPL-SCNC: 29 MMOL/L (ref 21–32)
CREAT SERPL-MCNC: 1 MG/DL (ref 0.9–1.3)
DEPRECATED RDW RBC AUTO: 12.5 % (ref 12.4–15.4)
GFR SERPLBLD CREATININE-BSD FMLA CKD-EPI: >60 ML/MIN/{1.73_M2}
GLUCOSE SERPL-MCNC: 111 MG/DL (ref 70–99)
GRAM STN SPEC: ABNORMAL
HCT VFR BLD AUTO: 27.1 % (ref 40.5–52.5)
HGB BLD-MCNC: 9.4 G/DL (ref 13.5–17.5)
MCH RBC QN AUTO: 34.7 PG (ref 26–34)
MCHC RBC AUTO-ENTMCNC: 34.7 G/DL (ref 31–36)
MCV RBC AUTO: 100.2 FL (ref 80–100)
ORGANISM: ABNORMAL
PLATELET # BLD AUTO: 263 K/UL (ref 135–450)
PMV BLD AUTO: 7.4 FL (ref 5–10.5)
POTASSIUM SERPL-SCNC: 3.9 MMOL/L (ref 3.5–5.1)
RBC # BLD AUTO: 2.7 M/UL (ref 4.2–5.9)
SODIUM SERPL-SCNC: 139 MMOL/L (ref 136–145)
WBC # BLD AUTO: 6.1 K/UL (ref 4–11)

## 2023-12-10 PROCEDURE — 71045 X-RAY EXAM CHEST 1 VIEW: CPT

## 2023-12-10 PROCEDURE — 6370000000 HC RX 637 (ALT 250 FOR IP): Performed by: INTERNAL MEDICINE

## 2023-12-10 PROCEDURE — 6360000002 HC RX W HCPCS: Performed by: SURGERY

## 2023-12-10 PROCEDURE — 6360000002 HC RX W HCPCS: Performed by: NURSE PRACTITIONER

## 2023-12-10 PROCEDURE — 99233 SBSQ HOSP IP/OBS HIGH 50: CPT | Performed by: INTERNAL MEDICINE

## 2023-12-10 PROCEDURE — 94669 MECHANICAL CHEST WALL OSCILL: CPT

## 2023-12-10 PROCEDURE — 94640 AIRWAY INHALATION TREATMENT: CPT

## 2023-12-10 PROCEDURE — 2700000000 HC OXYGEN THERAPY PER DAY

## 2023-12-10 PROCEDURE — 6370000000 HC RX 637 (ALT 250 FOR IP): Performed by: CLINICAL NURSE SPECIALIST

## 2023-12-10 PROCEDURE — 2000000000 HC ICU R&B

## 2023-12-10 PROCEDURE — 6360000002 HC RX W HCPCS: Performed by: INTERNAL MEDICINE

## 2023-12-10 PROCEDURE — 2580000003 HC RX 258: Performed by: SURGERY

## 2023-12-10 PROCEDURE — 80048 BASIC METABOLIC PNL TOTAL CA: CPT

## 2023-12-10 PROCEDURE — 85027 COMPLETE CBC AUTOMATED: CPT

## 2023-12-10 PROCEDURE — 6370000000 HC RX 637 (ALT 250 FOR IP): Performed by: SURGERY

## 2023-12-10 PROCEDURE — 94761 N-INVAS EAR/PLS OXIMETRY MLT: CPT

## 2023-12-10 RX ORDER — POLYETHYLENE GLYCOL 3350 17 G/17G
17 POWDER, FOR SOLUTION ORAL DAILY
Status: DISCONTINUED | OUTPATIENT
Start: 2023-12-10 | End: 2023-12-11 | Stop reason: HOSPADM

## 2023-12-10 RX ADMIN — OXYCODONE 10 MG: 5 TABLET ORAL at 20:04

## 2023-12-10 RX ADMIN — CLONAZEPAM 1 MG: 1 TABLET ORAL at 00:28

## 2023-12-10 RX ADMIN — LEVALBUTEROL 1.25 MG: 1.25 SOLUTION, CONCENTRATE RESPIRATORY (INHALATION) at 13:49

## 2023-12-10 RX ADMIN — Medication 10 ML: at 09:00

## 2023-12-10 RX ADMIN — POLYETHYLENE GLYCOL 3350 17 G: 17 POWDER, FOR SOLUTION ORAL at 11:34

## 2023-12-10 RX ADMIN — OXYCODONE 10 MG: 5 TABLET ORAL at 15:01

## 2023-12-10 RX ADMIN — ACETYLCYSTEINE 600 MG: 200 INHALANT RESPIRATORY (INHALATION) at 08:12

## 2023-12-10 RX ADMIN — CLONAZEPAM 1 MG: 1 TABLET ORAL at 11:52

## 2023-12-10 RX ADMIN — CLOPIDOGREL BISULFATE 75 MG: 75 TABLET ORAL at 08:41

## 2023-12-10 RX ADMIN — CARVEDILOL 3.12 MG: 3.12 TABLET, FILM COATED ORAL at 20:03

## 2023-12-10 RX ADMIN — EZETIMIBE 10 MG: 10 TABLET ORAL at 08:41

## 2023-12-10 RX ADMIN — PANTOPRAZOLE SODIUM 40 MG: 40 TABLET, DELAYED RELEASE ORAL at 06:34

## 2023-12-10 RX ADMIN — DOCUSATE SODIUM 100 MG: 100 CAPSULE, LIQUID FILLED ORAL at 09:00

## 2023-12-10 RX ADMIN — LEVOFLOXACIN 750 MG: 5 INJECTION, SOLUTION INTRAVENOUS at 11:42

## 2023-12-10 RX ADMIN — ENOXAPARIN SODIUM 40 MG: 100 INJECTION SUBCUTANEOUS at 08:43

## 2023-12-10 RX ADMIN — ACETYLCYSTEINE 600 MG: 200 INHALANT RESPIRATORY (INHALATION) at 19:25

## 2023-12-10 RX ADMIN — SENNOSIDES 8.6 MG: 8.6 TABLET, FILM COATED ORAL at 20:03

## 2023-12-10 RX ADMIN — ATORVASTATIN CALCIUM 40 MG: 40 TABLET, FILM COATED ORAL at 20:04

## 2023-12-10 RX ADMIN — LEVALBUTEROL 1.25 MG: 1.25 SOLUTION, CONCENTRATE RESPIRATORY (INHALATION) at 08:12

## 2023-12-10 RX ADMIN — LEVALBUTEROL 0.63 MG: 1.25 SOLUTION, CONCENTRATE RESPIRATORY (INHALATION) at 19:25

## 2023-12-10 RX ADMIN — Medication 10 ML: at 20:06

## 2023-12-10 RX ADMIN — CARVEDILOL 3.12 MG: 3.12 TABLET, FILM COATED ORAL at 08:41

## 2023-12-10 RX ADMIN — DOCUSATE SODIUM 100 MG: 100 CAPSULE, LIQUID FILLED ORAL at 20:04

## 2023-12-10 RX ADMIN — OXYCODONE 10 MG: 5 TABLET ORAL at 08:41

## 2023-12-10 ASSESSMENT — PAIN DESCRIPTION - LOCATION
LOCATION: ABDOMEN;INCISION
LOCATION: CHEST;BACK
LOCATION: ABDOMEN

## 2023-12-10 ASSESSMENT — PAIN SCALES - GENERAL
PAINLEVEL_OUTOF10: 9
PAINLEVEL_OUTOF10: 0
PAINLEVEL_OUTOF10: 3
PAINLEVEL_OUTOF10: 0
PAINLEVEL_OUTOF10: 10
PAINLEVEL_OUTOF10: 2
PAINLEVEL_OUTOF10: 8

## 2023-12-10 ASSESSMENT — PAIN DESCRIPTION - ORIENTATION: ORIENTATION: MID

## 2023-12-10 ASSESSMENT — PAIN DESCRIPTION - DESCRIPTORS
DESCRIPTORS: DISCOMFORT;ACHING
DESCRIPTORS: ACHING;BURNING;STABBING
DESCRIPTORS: ACHING

## 2023-12-11 ENCOUNTER — HOSPITAL ENCOUNTER (INPATIENT)
Age: 58
DRG: 862 | End: 2023-12-11
Attending: PHYSICAL MEDICINE & REHABILITATION | Admitting: PHYSICAL MEDICINE & REHABILITATION
Payer: COMMERCIAL

## 2023-12-11 VITALS
TEMPERATURE: 98.1 F | OXYGEN SATURATION: 92 % | WEIGHT: 171.08 LBS | HEIGHT: 69 IN | DIASTOLIC BLOOD PRESSURE: 63 MMHG | SYSTOLIC BLOOD PRESSURE: 117 MMHG | HEART RATE: 76 BPM | RESPIRATION RATE: 16 BRPM | BODY MASS INDEX: 25.34 KG/M2

## 2023-12-11 PROBLEM — R53.81 DEBILITY: Status: ACTIVE | Noted: 2023-12-11

## 2023-12-11 PROCEDURE — 6370000000 HC RX 637 (ALT 250 FOR IP): Performed by: INTERNAL MEDICINE

## 2023-12-11 PROCEDURE — 2580000003 HC RX 258: Performed by: SURGERY

## 2023-12-11 PROCEDURE — 6370000000 HC RX 637 (ALT 250 FOR IP): Performed by: SURGERY

## 2023-12-11 PROCEDURE — 6360000002 HC RX W HCPCS: Performed by: PHYSICAL MEDICINE & REHABILITATION

## 2023-12-11 PROCEDURE — 94640 AIRWAY INHALATION TREATMENT: CPT

## 2023-12-11 PROCEDURE — 94669 MECHANICAL CHEST WALL OSCILL: CPT

## 2023-12-11 PROCEDURE — 6370000000 HC RX 637 (ALT 250 FOR IP): Performed by: PHYSICAL MEDICINE & REHABILITATION

## 2023-12-11 PROCEDURE — 6370000000 HC RX 637 (ALT 250 FOR IP): Performed by: CLINICAL NURSE SPECIALIST

## 2023-12-11 PROCEDURE — 1280000000 HC REHAB R&B

## 2023-12-11 PROCEDURE — 94761 N-INVAS EAR/PLS OXIMETRY MLT: CPT

## 2023-12-11 PROCEDURE — 2580000003 HC RX 258: Performed by: PHYSICAL MEDICINE & REHABILITATION

## 2023-12-11 PROCEDURE — 6360000002 HC RX W HCPCS: Performed by: NURSE PRACTITIONER

## 2023-12-11 PROCEDURE — 6360000002 HC RX W HCPCS: Performed by: SURGERY

## 2023-12-11 RX ORDER — CEFUROXIME AXETIL 250 MG/1
500 TABLET ORAL EVERY 12 HOURS SCHEDULED
Status: CANCELLED | OUTPATIENT
Start: 2023-12-11 | End: 2023-12-16

## 2023-12-11 RX ORDER — ASPIRIN 81 MG/1
81 TABLET ORAL DAILY
Status: DISCONTINUED | OUTPATIENT
Start: 2023-12-11 | End: 2023-12-11 | Stop reason: HOSPADM

## 2023-12-11 RX ORDER — DOCUSATE SODIUM 100 MG/1
100 CAPSULE, LIQUID FILLED ORAL 2 TIMES DAILY
Status: DISCONTINUED | OUTPATIENT
Start: 2023-12-11 | End: 2023-12-14

## 2023-12-11 RX ORDER — POLYETHYLENE GLYCOL 3350 17 G/17G
17 POWDER, FOR SOLUTION ORAL DAILY PRN
Status: ACTIVE | OUTPATIENT
Start: 2023-12-11

## 2023-12-11 RX ORDER — ACETAMINOPHEN 325 MG/1
650 TABLET ORAL EVERY 4 HOURS PRN
Status: ACTIVE | OUTPATIENT
Start: 2023-12-11

## 2023-12-11 RX ORDER — LEVALBUTEROL 1.25 MG/.5ML
0.63 SOLUTION, CONCENTRATE RESPIRATORY (INHALATION)
Status: ON HOLD | DISCHARGE
Start: 2023-12-11

## 2023-12-11 RX ORDER — CLOPIDOGREL BISULFATE 75 MG/1
75 TABLET ORAL DAILY
Status: CANCELLED | OUTPATIENT
Start: 2023-12-12

## 2023-12-11 RX ORDER — PROMETHAZINE HYDROCHLORIDE 25 MG/1
12.5 TABLET ORAL EVERY 6 HOURS PRN
Status: ACTIVE | OUTPATIENT
Start: 2023-12-11

## 2023-12-11 RX ORDER — ENOXAPARIN SODIUM 100 MG/ML
40 INJECTION SUBCUTANEOUS DAILY
Status: CANCELLED | OUTPATIENT
Start: 2023-12-11

## 2023-12-11 RX ORDER — SODIUM CHLORIDE 0.9 % (FLUSH) 0.9 %
5-40 SYRINGE (ML) INJECTION PRN
Status: DISCONTINUED | OUTPATIENT
Start: 2023-12-11 | End: 2023-12-15

## 2023-12-11 RX ORDER — ACETYLCYSTEINE 200 MG/ML
600 SOLUTION ORAL; RESPIRATORY (INHALATION)
Status: ON HOLD | DISCHARGE
Start: 2023-12-11

## 2023-12-11 RX ORDER — POLYETHYLENE GLYCOL 3350 17 G/17G
17 POWDER, FOR SOLUTION ORAL DAILY
Qty: 527 G | Refills: 0 | Status: ON HOLD | DISCHARGE
Start: 2023-12-12 | End: 2024-01-12

## 2023-12-11 RX ORDER — ACETYLCYSTEINE 200 MG/ML
600 SOLUTION ORAL; RESPIRATORY (INHALATION)
Status: DISCONTINUED | OUTPATIENT
Start: 2023-12-11 | End: 2023-12-12

## 2023-12-11 RX ORDER — LANOLIN ALCOHOL/MO/W.PET/CERES
6 CREAM (GRAM) TOPICAL NIGHTLY PRN
Status: DISCONTINUED | OUTPATIENT
Start: 2023-12-11 | End: 2023-12-17

## 2023-12-11 RX ORDER — ASPIRIN 81 MG/1
81 TABLET ORAL DAILY
Status: DISPENSED | OUTPATIENT
Start: 2023-12-12

## 2023-12-11 RX ORDER — EZETIMIBE 10 MG/1
10 TABLET ORAL DAILY
Status: CANCELLED | OUTPATIENT
Start: 2023-12-12

## 2023-12-11 RX ORDER — ONDANSETRON 2 MG/ML
4 INJECTION INTRAMUSCULAR; INTRAVENOUS EVERY 6 HOURS PRN
Status: CANCELLED | OUTPATIENT
Start: 2023-12-11

## 2023-12-11 RX ORDER — BISACODYL 5 MG/1
5 TABLET, DELAYED RELEASE ORAL DAILY
Status: DISCONTINUED | OUTPATIENT
Start: 2023-12-11 | End: 2023-12-14

## 2023-12-11 RX ORDER — LANOLIN ALCOHOL/MO/W.PET/CERES
6 CREAM (GRAM) TOPICAL NIGHTLY PRN
Status: CANCELLED | OUTPATIENT
Start: 2023-12-11

## 2023-12-11 RX ORDER — CEFUROXIME AXETIL 500 MG/1
500 TABLET ORAL EVERY 12 HOURS SCHEDULED
Qty: 10 TABLET | Refills: 0 | Status: ON HOLD | DISCHARGE
Start: 2023-12-11 | End: 2023-12-16

## 2023-12-11 RX ORDER — ASPIRIN 81 MG/1
81 TABLET ORAL DAILY
Status: CANCELLED | OUTPATIENT
Start: 2023-12-12

## 2023-12-11 RX ORDER — CLOPIDOGREL BISULFATE 75 MG/1
75 TABLET ORAL DAILY
Status: DISPENSED | OUTPATIENT
Start: 2023-12-12

## 2023-12-11 RX ORDER — CLONAZEPAM 1 MG/1
1 TABLET ORAL 3 TIMES DAILY PRN
Status: CANCELLED | OUTPATIENT
Start: 2023-12-11

## 2023-12-11 RX ORDER — PANTOPRAZOLE SODIUM 40 MG/1
40 TABLET, DELAYED RELEASE ORAL
Status: CANCELLED | OUTPATIENT
Start: 2023-12-12

## 2023-12-11 RX ORDER — CARVEDILOL 3.12 MG/1
3.12 TABLET ORAL 2 TIMES DAILY
Status: DISPENSED | OUTPATIENT
Start: 2023-12-11

## 2023-12-11 RX ORDER — SODIUM CHLORIDE 0.9 % (FLUSH) 0.9 %
5-40 SYRINGE (ML) INJECTION EVERY 12 HOURS SCHEDULED
Status: CANCELLED | OUTPATIENT
Start: 2023-12-11

## 2023-12-11 RX ORDER — ONDANSETRON 2 MG/ML
4 INJECTION INTRAMUSCULAR; INTRAVENOUS EVERY 6 HOURS PRN
Status: ACTIVE | OUTPATIENT
Start: 2023-12-11

## 2023-12-11 RX ORDER — LEVALBUTEROL 1.25 MG/.5ML
0.63 SOLUTION, CONCENTRATE RESPIRATORY (INHALATION)
Status: CANCELLED | OUTPATIENT
Start: 2023-12-11

## 2023-12-11 RX ORDER — PANTOPRAZOLE SODIUM 40 MG/1
40 TABLET, DELAYED RELEASE ORAL
Status: DISPENSED | OUTPATIENT
Start: 2023-12-12

## 2023-12-11 RX ORDER — CARVEDILOL 3.12 MG/1
3.12 TABLET ORAL 2 TIMES DAILY
Status: CANCELLED | OUTPATIENT
Start: 2023-12-11

## 2023-12-11 RX ORDER — BISACODYL 5 MG/1
5 TABLET, DELAYED RELEASE ORAL DAILY
Status: CANCELLED | OUTPATIENT
Start: 2023-12-11

## 2023-12-11 RX ORDER — SODIUM CHLORIDE 0.9 % (FLUSH) 0.9 %
5-40 SYRINGE (ML) INJECTION EVERY 12 HOURS SCHEDULED
Status: DISCONTINUED | OUTPATIENT
Start: 2023-12-11 | End: 2023-12-15

## 2023-12-11 RX ORDER — SENNOSIDES A AND B 8.6 MG/1
1 TABLET, FILM COATED ORAL NIGHTLY
Status: CANCELLED | OUTPATIENT
Start: 2023-12-11

## 2023-12-11 RX ORDER — SENNOSIDES A AND B 8.6 MG/1
1 TABLET, FILM COATED ORAL NIGHTLY
Qty: 30 TABLET | Refills: 0 | Status: ON HOLD | DISCHARGE
Start: 2023-12-11 | End: 2024-01-10

## 2023-12-11 RX ORDER — CEFUROXIME AXETIL 250 MG/1
500 TABLET ORAL EVERY 12 HOURS SCHEDULED
Status: DISCONTINUED | OUTPATIENT
Start: 2023-12-11 | End: 2023-12-11 | Stop reason: HOSPADM

## 2023-12-11 RX ORDER — OXYCODONE HYDROCHLORIDE 5 MG/1
5 TABLET ORAL EVERY 6 HOURS PRN
Refills: 0 | Status: ON HOLD | OUTPATIENT
Start: 2023-12-11 | End: 2023-12-18

## 2023-12-11 RX ORDER — SODIUM CHLORIDE 0.9 % (FLUSH) 0.9 %
5-40 SYRINGE (ML) INJECTION PRN
Status: CANCELLED | OUTPATIENT
Start: 2023-12-11

## 2023-12-11 RX ORDER — BISACODYL 10 MG
10 SUPPOSITORY, RECTAL RECTAL ONCE
Status: COMPLETED | OUTPATIENT
Start: 2023-12-11 | End: 2023-12-11

## 2023-12-11 RX ORDER — ACETYLCYSTEINE 200 MG/ML
600 SOLUTION ORAL; RESPIRATORY (INHALATION)
Status: CANCELLED | OUTPATIENT
Start: 2023-12-11

## 2023-12-11 RX ORDER — OXYCODONE HYDROCHLORIDE 5 MG/1
5 TABLET ORAL EVERY 4 HOURS PRN
Status: DISPENSED | OUTPATIENT
Start: 2023-12-11

## 2023-12-11 RX ORDER — OXYCODONE HYDROCHLORIDE 5 MG/1
5 TABLET ORAL EVERY 4 HOURS PRN
Status: CANCELLED | OUTPATIENT
Start: 2023-12-11

## 2023-12-11 RX ORDER — ENOXAPARIN SODIUM 100 MG/ML
40 INJECTION SUBCUTANEOUS DAILY
Status: DISPENSED | OUTPATIENT
Start: 2023-12-11

## 2023-12-11 RX ORDER — ATORVASTATIN CALCIUM 40 MG/1
40 TABLET, FILM COATED ORAL NIGHTLY
Status: DISPENSED | OUTPATIENT
Start: 2023-12-11

## 2023-12-11 RX ORDER — SENNOSIDES A AND B 8.6 MG/1
1 TABLET, FILM COATED ORAL NIGHTLY
Status: DISCONTINUED | OUTPATIENT
Start: 2023-12-11 | End: 2023-12-14

## 2023-12-11 RX ORDER — POLYETHYLENE GLYCOL 3350 17 G/17G
17 POWDER, FOR SOLUTION ORAL DAILY PRN
Status: CANCELLED | OUTPATIENT
Start: 2023-12-11

## 2023-12-11 RX ORDER — CLONAZEPAM 0.5 MG/1
1 TABLET ORAL 3 TIMES DAILY PRN
Status: DISPENSED | OUTPATIENT
Start: 2023-12-11

## 2023-12-11 RX ORDER — PROMETHAZINE HYDROCHLORIDE 25 MG/1
12.5 TABLET ORAL EVERY 6 HOURS PRN
Status: CANCELLED | OUTPATIENT
Start: 2023-12-11

## 2023-12-11 RX ORDER — DOCUSATE SODIUM 100 MG/1
100 CAPSULE, LIQUID FILLED ORAL 2 TIMES DAILY
Status: CANCELLED | OUTPATIENT
Start: 2023-12-11

## 2023-12-11 RX ORDER — ATORVASTATIN CALCIUM 40 MG/1
40 TABLET, FILM COATED ORAL NIGHTLY
Status: CANCELLED | OUTPATIENT
Start: 2023-12-11

## 2023-12-11 RX ORDER — CEFUROXIME AXETIL 250 MG/1
500 TABLET ORAL EVERY 12 HOURS SCHEDULED
Status: COMPLETED | OUTPATIENT
Start: 2023-12-11 | End: 2023-12-15

## 2023-12-11 RX ORDER — LEVALBUTEROL 1.25 MG/.5ML
0.63 SOLUTION, CONCENTRATE RESPIRATORY (INHALATION)
Status: ACTIVE | OUTPATIENT
Start: 2023-12-11

## 2023-12-11 RX ORDER — EZETIMIBE 10 MG/1
10 TABLET ORAL DAILY
Status: DISCONTINUED | OUTPATIENT
Start: 2023-12-12 | End: 2023-12-11

## 2023-12-11 RX ORDER — ACETAMINOPHEN 325 MG/1
650 TABLET ORAL EVERY 4 HOURS PRN
Status: CANCELLED | OUTPATIENT
Start: 2023-12-11

## 2023-12-11 RX ADMIN — MUPIROCIN: 20 OINTMENT TOPICAL at 08:15

## 2023-12-11 RX ADMIN — LEVALBUTEROL 0.63 MG: 1.25 SOLUTION, CONCENTRATE RESPIRATORY (INHALATION) at 20:55

## 2023-12-11 RX ADMIN — CEFUROXIME AXETIL 500 MG: 250 TABLET ORAL at 21:40

## 2023-12-11 RX ADMIN — OXYCODONE 5 MG: 5 TABLET ORAL at 12:29

## 2023-12-11 RX ADMIN — CLONAZEPAM 1 MG: 1 TABLET ORAL at 04:35

## 2023-12-11 RX ADMIN — ATORVASTATIN CALCIUM 40 MG: 40 TABLET, FILM COATED ORAL at 21:40

## 2023-12-11 RX ADMIN — LEVALBUTEROL 0.63 MG: 1.25 SOLUTION, CONCENTRATE RESPIRATORY (INHALATION) at 15:24

## 2023-12-11 RX ADMIN — EZETIMIBE 10 MG: 10 TABLET ORAL at 08:09

## 2023-12-11 RX ADMIN — MUPIROCIN: 20 OINTMENT TOPICAL at 21:59

## 2023-12-11 RX ADMIN — ACETYLCYSTEINE 600 MG: 200 INHALANT RESPIRATORY (INHALATION) at 20:55

## 2023-12-11 RX ADMIN — LEVALBUTEROL 0.63 MG: 1.25 SOLUTION, CONCENTRATE RESPIRATORY (INHALATION) at 08:43

## 2023-12-11 RX ADMIN — OXYCODONE 10 MG: 5 TABLET ORAL at 00:16

## 2023-12-11 RX ADMIN — CLONAZEPAM 1 MG: 1 TABLET ORAL at 17:28

## 2023-12-11 RX ADMIN — PANTOPRAZOLE SODIUM 40 MG: 40 TABLET, DELAYED RELEASE ORAL at 07:01

## 2023-12-11 RX ADMIN — ACETYLCYSTEINE 600 MG: 200 INHALANT RESPIRATORY (INHALATION) at 08:43

## 2023-12-11 RX ADMIN — POLYETHYLENE GLYCOL 3350 17 G: 17 POWDER, FOR SOLUTION ORAL at 08:09

## 2023-12-11 RX ADMIN — DOCUSATE SODIUM 100 MG: 100 CAPSULE, LIQUID FILLED ORAL at 21:40

## 2023-12-11 RX ADMIN — SODIUM CHLORIDE, PRESERVATIVE FREE 10 ML: 5 INJECTION INTRAVENOUS at 21:59

## 2023-12-11 RX ADMIN — CEFUROXIME AXETIL 500 MG: 250 TABLET ORAL at 10:46

## 2023-12-11 RX ADMIN — DOCUSATE SODIUM 100 MG: 100 CAPSULE, LIQUID FILLED ORAL at 08:09

## 2023-12-11 RX ADMIN — BISACODYL 10 MG: 10 SUPPOSITORY RECTAL at 08:10

## 2023-12-11 RX ADMIN — CLOPIDOGREL BISULFATE 75 MG: 75 TABLET ORAL at 08:09

## 2023-12-11 RX ADMIN — CARVEDILOL 3.12 MG: 3.12 TABLET, FILM COATED ORAL at 21:40

## 2023-12-11 RX ADMIN — SENNOSIDES 8.6 MG: 8.6 TABLET, COATED ORAL at 21:40

## 2023-12-11 RX ADMIN — CARVEDILOL 3.12 MG: 3.12 TABLET, FILM COATED ORAL at 08:09

## 2023-12-11 RX ADMIN — PROMETHAZINE HYDROCHLORIDE 12.5 MG: 25 TABLET ORAL at 00:16

## 2023-12-11 RX ADMIN — ENOXAPARIN SODIUM 40 MG: 100 INJECTION SUBCUTANEOUS at 08:10

## 2023-12-11 RX ADMIN — OXYCODONE 10 MG: 5 TABLET ORAL at 07:14

## 2023-12-11 RX ADMIN — Medication 10 ML: at 08:10

## 2023-12-11 RX ADMIN — OXYCODONE 5 MG: 5 TABLET ORAL at 17:36

## 2023-12-11 RX ADMIN — ASPIRIN 81 MG: 81 TABLET, COATED ORAL at 08:15

## 2023-12-11 ASSESSMENT — PAIN DESCRIPTION - LOCATION
LOCATION: INCISION;ABDOMEN
LOCATION: ABDOMEN;INCISION

## 2023-12-11 ASSESSMENT — PAIN SCALES - GENERAL
PAINLEVEL_OUTOF10: 9
PAINLEVEL_OUTOF10: 10
PAINLEVEL_OUTOF10: 0
PAINLEVEL_OUTOF10: 9

## 2023-12-11 ASSESSMENT — PAIN DESCRIPTION - DESCRIPTORS
DESCRIPTORS: ACHING;DISCOMFORT
DESCRIPTORS: ACHING;DISCOMFORT

## 2023-12-11 NOTE — CARE COORDINATION
Spoke to [employer], Pt is ready for discharge. Called Soila Cadet at [facility] is approved and a bed is available.      9:32 AM Left a vm for pt's sister Emma Dubois about transfer today at 2 pm

## 2023-12-11 NOTE — DISCHARGE SUMMARY
rate and rhythm, S1, S2 normal, no murmur, click, rub or gallop Sinus  Abdomen: soft, non-tender; bowel sounds normal; no masses,  no organomegaly + flatus, no BM  Wound: Lt flank incision open to air with staples intact  Extremities: extremities normal, atraumatic, no cyanosis or edema    Discharge Medications:      Medication List        START taking these medications      acetylcysteine 20 % nebulizer solution  Commonly known as: MUCOMYST  Inhale 3 mLs into the lungs in the morning and 3 mLs in the evening. cefUROXime 500 MG tablet  Commonly known as: CEFTIN  Take 1 tablet by mouth every 12 hours for 10 doses     levalbuterol 1.25 MG/0.5ML nebulizer solution  Commonly known as: XOPENEX  Take 0.25 mLs by nebulization every 6 hours while awake     oxyCODONE 5 MG immediate release tablet  Commonly known as: ROXICODONE  Take 1 tablet by mouth every 6 hours as needed for Pain for up to 7 days.  Max Daily Amount: 20 mg     polyethylene glycol 17 g packet  Commonly known as: GLYCOLAX  Take 1 packet by mouth daily  Start taking on: December 12, 2023     senna 8.6 MG tablet  Commonly known as: SENOKOT  Take 1 tablet by mouth nightly            CONTINUE taking these medications      aspirin 81 MG EC tablet  Take 1 tablet by mouth daily     atorvastatin 40 MG tablet  Commonly known as: LIPITOR  Take 1 tablet by mouth daily     carvedilol 3.125 MG tablet  Commonly known as: COREG  Take 1 tablet by mouth 2 times daily     clonazePAM 1 MG tablet  Commonly known as: KLONOPIN     clopidogrel 75 MG tablet  Commonly known as: Plavix  Take 1 tablet by mouth daily     ezetimibe 10 MG tablet  Commonly known as: Shanika Johnson            STOP taking these medications      lisinopril-hydroCHLOROthiazide 20-12.5 MG per tablet  Commonly known as: PRINZIDE;ZESTORETIC               Where to Get Your Medications        You can get these medications from any pharmacy    Bring a paper prescription for each of these medications  oxyCODONE 5 MG

## 2023-12-11 NOTE — DISCHARGE INSTR - COC
Barium Swallow with Video (Video Swallowing Test): not done    Treatments at the Time of Hospital Discharge:   Respiratory Treatments: Mucomyst, Xopenex  Oxygen Therapy:  is on oxygen at 1 L/min per nasal cannula. Ventilator:    - No ventilator support    Rehab Therapies: Physical Therapy and Occupational Therapy  Weight Bearing Status/Restrictions: No weight bearing restrictions  Other Medical Equipment (for information only, NOT a DME order):  N/A  Other Treatments: N/A    Patient's personal belongings (please select all that are sent with patient):  Glasses, Clothes    RN SIGNATURE:  Electronically signed by Selena Rudd RN on 12/11/23 at 2:43 PM EST    CASE MANAGEMENT/SOCIAL WORK SECTION    Inpatient Status Date: 12/4/2023    Readmission Risk Assessment Score:  Readmission Risk              Risk of Unplanned Readmission:  12           Discharging to Facility/ [place]   [address] [facility], [facility] [address]    / signature: Electronically signed by Lali Bernal RN on 12/11/23 at 9:31 AM EST    PHYSICIAN SECTION    Prognosis: Good    Condition at Discharge: Stable    Rehab Potential (if transferring to Rehab): Good    Recommended Labs or Other Treatments After Discharge: MUST BE [address]    Pre-op weight:   169 lbs    Incision/Wound Care:    Lt flank      Clean with antibacterial soap and water daily. Monitor for signs of infection    Upper Extremity Restrictions (sternal precautions):  No lifting, pushing, pulling over 5 pounds for 8 weeks. Remove chest tubes sutures 10 days post-op, after checking with surgeon's office. Please remove IJ suture if it is still in.   1.  Wash EACH incision daily with separate wash cloth with antibacterial soap and water; pat dry. Do NOT apply anything to incisions - NO LOTIONS, HYDROGEN PEROXIDE, POWDER. 2.  Leave open to air unless draining.    3. SANJUANITA hose on

## 2023-12-11 NOTE — CARE COORDINATION
SWATI scheduled transport with [facility] at [phone] to take patient to [address] [facility]. CM is aware of the above.   Christianna Cheadle, MSW

## 2023-12-11 NOTE — CONSULTS
Cm Conde  12/11/2023  [id number]    Rehab Brief Consult:    Patient is able to tolerate 3 hours of therapy 5 days per week and is medically appropriate for rehab at the Acute Rehabilitation Unit. Approved for admission today. Orders placed for transfer. Per [facility]  The patient is a 59 y.o.  male with known AAA and has been followed with CTA. A recent CTA showed an increased in size of AAA and abutting the renal arteries. Posterior to the renal artery, there was an outpouching of the aorta. It was felt the safest way to completely repair the aorta and have a sound proximal anastomosis would be through a lateral retroperitoneal approach. Plan to [facility] to [facility] with medical transport     Thank you for the consultation.     Rosa Maria Santiago D.O. M.P.H  PM&R  12/11/2023  2:15 PM
Patient: Jeimy Mariee  [id number]  Date: 12/6/2023      Chief Complaint: AAA    History of Present Illness/Hospital Course:  Jozef Hebert is a 59year old male with a past medical history significant for CAD, COPD, HTN, HLD, neuropathy, and tobacco use who presented to [address] [place] [address] on 12/4/23 for planned open AAA repair and left renal artery implantation. Post operative course has been notable for pulmonary insufficiency. Pulmonology is consulted. He remains on 40 L O2. Course has also been notable for anemia. He continues to have functional deficits below his baseline. Today he is seen without family present. He reports being interested in ARU. has a past medical history of Abdominal aortic aneurysm (AAA) ([address]), Anxiety, Arthritis, Bulging disc, CAD (coronary artery disease), COPD (chronic obstructive pulmonary disease) ([address]), Dependent personality disorder ([address]), Diverticulosis, Enlarged prostate, History of percutaneous left heart catheterization, Hyperlipidemia, Hypertension, Leukocytosis, MDD (major depressive disorder), Neuropathy, Tobacco abuse, and Weight loss. has a past surgical history that includes hernia repair; Colonoscopy; Upper gastrointestinal endoscopy; Inguinal hernia repair (11/14/12); Umbilical hernia repair (3/7/14); Cystocopy (2/27/2014); and Abdominal aortic aneurysm repair (Left, 12/4/2023). reports that he has been smoking cigarettes. He has a 7.50 pack-year smoking history. He has never used smokeless tobacco. He reports current alcohol use of about 2.0 standard drinks of alcohol per week. He reports that he does not use drugs. family history includes COPD in his father and mother; Cancer in his father; Dementia in his mother; Heart Disease in his father; High Blood Pressure in his father. REVIEW OF SYSTEMS:   CONSTITUTIONAL: negative for fevers, chills, diaphoresis, activity change, appetite change, fatigue, night sweats and unexpected weight change.    EYES:
further decompensation needs to monitor closely in the ICU      Critical care time spent with patient was 35 minutes exclusive of any procedures            Electronically signed by:  Rene Mcleod MD    12/5/2023    3:38 PM.

## 2023-12-11 NOTE — CARE COORDINATION
Squad transport has changed the time is now 5:30pm  and pt, RN and sister are aware.     Notified Lidia Hodge at [facility]

## 2023-12-12 PROCEDURE — 94640 AIRWAY INHALATION TREATMENT: CPT

## 2023-12-12 PROCEDURE — 6370000000 HC RX 637 (ALT 250 FOR IP): Performed by: PHYSICAL MEDICINE & REHABILITATION

## 2023-12-12 PROCEDURE — 97530 THERAPEUTIC ACTIVITIES: CPT

## 2023-12-12 PROCEDURE — 94761 N-INVAS EAR/PLS OXIMETRY MLT: CPT

## 2023-12-12 PROCEDURE — 1280000000 HC REHAB R&B

## 2023-12-12 PROCEDURE — 97166 OT EVAL MOD COMPLEX 45 MIN: CPT

## 2023-12-12 PROCEDURE — 94150 VITAL CAPACITY TEST: CPT

## 2023-12-12 PROCEDURE — 97535 SELF CARE MNGMENT TRAINING: CPT

## 2023-12-12 PROCEDURE — 6360000002 HC RX W HCPCS: Performed by: PHYSICAL MEDICINE & REHABILITATION

## 2023-12-12 PROCEDURE — 97161 PT EVAL LOW COMPLEX 20 MIN: CPT

## 2023-12-12 RX ORDER — ACETYLCYSTEINE 200 MG/ML
600 SOLUTION ORAL; RESPIRATORY (INHALATION)
Status: ACTIVE | OUTPATIENT
Start: 2023-12-12

## 2023-12-12 RX ADMIN — LEVALBUTEROL 0.63 MG: 1.25 SOLUTION, CONCENTRATE RESPIRATORY (INHALATION) at 12:33

## 2023-12-12 RX ADMIN — SENNOSIDES 8.6 MG: 8.6 TABLET, COATED ORAL at 20:07

## 2023-12-12 RX ADMIN — ATORVASTATIN CALCIUM 40 MG: 40 TABLET, FILM COATED ORAL at 20:07

## 2023-12-12 RX ADMIN — DOCUSATE SODIUM 100 MG: 100 CAPSULE, LIQUID FILLED ORAL at 20:07

## 2023-12-12 RX ADMIN — OXYCODONE 5 MG: 5 TABLET ORAL at 08:34

## 2023-12-12 RX ADMIN — OXYCODONE 5 MG: 5 TABLET ORAL at 20:10

## 2023-12-12 RX ADMIN — OXYCODONE 5 MG: 5 TABLET ORAL at 02:32

## 2023-12-12 RX ADMIN — CLONAZEPAM 1 MG: 0.5 TABLET ORAL at 20:11

## 2023-12-12 RX ADMIN — ASPIRIN 81 MG: 81 TABLET, COATED ORAL at 08:31

## 2023-12-12 RX ADMIN — CARVEDILOL 3.12 MG: 3.12 TABLET, FILM COATED ORAL at 20:07

## 2023-12-12 RX ADMIN — CLOPIDOGREL BISULFATE 75 MG: 75 TABLET ORAL at 08:32

## 2023-12-12 RX ADMIN — BISACODYL 5 MG: 5 TABLET, COATED ORAL at 08:32

## 2023-12-12 RX ADMIN — PANTOPRAZOLE SODIUM 40 MG: 40 TABLET, DELAYED RELEASE ORAL at 06:42

## 2023-12-12 RX ADMIN — CARVEDILOL 3.12 MG: 3.12 TABLET, FILM COATED ORAL at 08:32

## 2023-12-12 RX ADMIN — LEVALBUTEROL 0.63 MG: 1.25 SOLUTION, CONCENTRATE RESPIRATORY (INHALATION) at 20:52

## 2023-12-12 RX ADMIN — CLONAZEPAM 1 MG: 0.5 TABLET ORAL at 02:32

## 2023-12-12 RX ADMIN — ENOXAPARIN SODIUM 40 MG: 100 INJECTION SUBCUTANEOUS at 08:32

## 2023-12-12 RX ADMIN — MUPIROCIN: 20 OINTMENT TOPICAL at 20:07

## 2023-12-12 RX ADMIN — DOCUSATE SODIUM 100 MG: 100 CAPSULE, LIQUID FILLED ORAL at 08:31

## 2023-12-12 RX ADMIN — OXYCODONE 5 MG: 5 TABLET ORAL at 13:01

## 2023-12-12 RX ADMIN — CEFUROXIME AXETIL 500 MG: 250 TABLET ORAL at 08:32

## 2023-12-12 RX ADMIN — CEFUROXIME AXETIL 500 MG: 250 TABLET ORAL at 20:07

## 2023-12-12 ASSESSMENT — PAIN SCALES - GENERAL
PAINLEVEL_OUTOF10: 8
PAINLEVEL_OUTOF10: 10
PAINLEVEL_OUTOF10: 5
PAINLEVEL_OUTOF10: 10
PAINLEVEL_OUTOF10: 8
PAINLEVEL_OUTOF10: 8
PAINLEVEL_OUTOF10: 0

## 2023-12-12 ASSESSMENT — PAIN DESCRIPTION - DESCRIPTORS
DESCRIPTORS: ACHING
DESCRIPTORS: OTHER (COMMENT);THROBBING
DESCRIPTORS: OTHER (COMMENT)
DESCRIPTORS: ACHING

## 2023-12-12 ASSESSMENT — PAIN - FUNCTIONAL ASSESSMENT
PAIN_FUNCTIONAL_ASSESSMENT: ACTIVITIES ARE NOT PREVENTED
PAIN_FUNCTIONAL_ASSESSMENT: PREVENTS OR INTERFERES SOME ACTIVE ACTIVITIES AND ADLS

## 2023-12-12 ASSESSMENT — PAIN DESCRIPTION - ORIENTATION
ORIENTATION: LEFT

## 2023-12-12 ASSESSMENT — PAIN DESCRIPTION - LOCATION
LOCATION: ABDOMEN
LOCATION: ABDOMEN
LOCATION: ABDOMEN;INCISION

## 2023-12-12 ASSESSMENT — PAIN SCALES - WONG BAKER
WONGBAKER_NUMERICALRESPONSE: 0

## 2023-12-12 ASSESSMENT — PAIN DESCRIPTION - PAIN TYPE: TYPE: SURGICAL PAIN

## 2023-12-12 ASSESSMENT — PAIN DESCRIPTION - FREQUENCY: FREQUENCY: CONTINUOUS

## 2023-12-12 NOTE — PROGRESS NOTES
[address] [facility] Department   Phone: [phone]    Occupational Therapy    [x] Initial Evaluation            [] Daily Treatment Note         [] Discharge Summary      Patient: Gali Soriano   : 1965   MRN: [id number]   Date of Service:  2023    Admitting Diagnosis:  Debility  Current Admission Summary: The patient is a 59 y.o.  male with known AAA and has been followed with CTA. A recent CTA showed an increased in size of AAA and abutting the renal arteries. Posterior to the renal artery, there was an outpouching of the aorta. It was felt the safest way to completely repair the aorta and have a sound proximal anastomosis would be through a lateral retroperitoneal approach. Past Medical History:  has a past medical history of Abdominal aortic aneurysm (AAA) ([address]), Anxiety, Arthritis, Bulging disc, CAD (coronary artery disease), COPD (chronic obstructive pulmonary disease) ([address]), Dependent personality disorder ([address]), Diverticulosis, Enlarged prostate, History of percutaneous left heart catheterization, Hyperlipidemia, Hypertension, Leukocytosis, MDD (major depressive disorder), Neuropathy, Tobacco abuse, and Weight loss. Past Surgical History:  has a past surgical history that includes hernia repair; Colonoscopy; Upper gastrointestinal endoscopy; Inguinal hernia repair (12); Umbilical hernia repair (3/7/14); Cystocopy (2014); Abdominal aortic aneurysm repair (Left, 2023); and bronchoscopy (N/A, 2023).     Discharge Recommendations: Olya Garcia    DME Required For Discharge: DME to be determined pending patient progress    Precautions/Restrictions: medium fall risk  Weight Bearing Restrictions: no restrictions  [] Right Upper Extremity  [] Left Upper Extremity [] Right Lower Extremity  [] Left Lower Extremity     Required Braces/Orthotics: no braces required   [] Right  [] Left  Positional Restrictions:no positional Group Co-treatment   Time In 9217       Time Out 1350       Minutes 35             Timed Code Treatment Minutes:   48 +35 minutes   Total Treatment Minutes:  98 minutes       Electronically Signed By: BOGDAN Blanco OTR/L [id number]

## 2023-12-12 NOTE — PROGRESS NOTES
Morning assessment completed, vss, can be orthostatic, very symptomatic this morning, The care plan and education has been reviewed and mutually agreed upon with the patient. Pt remains free from falls. Fall precautions in place--bed in lowest position, call light within reach, bed alarm in use. Pt aware to call for assistance before getting up.

## 2023-12-12 NOTE — H&P
Patient: Rosina Skinner  [id number]  Date: 12/12/2023      Chief Complaint: Debility     History of Present Illness/Hospital Course:  Josselin Coto is a 59year old male with a past medical history significant for CAD, COPD, HTN, HLD, neuropathy, and tobacco use who presented to [facility] on 12/4/23 for planned open AAA repair and left renal artery implantation. Post operative course has been notable for pulmonary insufficiency. Pulmonology is following. Course has also been notable for anemia. He continues to have functional deficits below his baseline. Prior Level of Function:  Independent     Current Level of Function:  Mod assist     Functional Deficits:  Decreased ADLs     has a past medical history of Abdominal aortic aneurysm (AAA) ([facility]), Anxiety, Arthritis, Bulging disc, CAD (coronary artery disease), COPD (chronic obstructive pulmonary disease) ([address]), Dependent personality disorder ([address]), Diverticulosis, Enlarged prostate, History of percutaneous left heart catheterization, Hyperlipidemia, Hypertension, Leukocytosis, MDD (major depressive disorder), Neuropathy, Tobacco abuse, and Weight loss. has a past surgical history that includes hernia repair; Colonoscopy; Upper gastrointestinal endoscopy; Inguinal hernia repair (11/14/12); Umbilical hernia repair (3/7/14); Cystocopy (2/27/2014); Abdominal aortic aneurysm repair (Left, 12/4/2023); and bronchoscopy (N/A, 12/8/2023). reports that he has been smoking cigarettes. He has a 7.50 pack-year smoking history. He has never used smokeless tobacco. He reports current alcohol use of about 2.0 standard drinks of alcohol per week. He reports that he does not use drugs. family history includes COPD in his father and mother; Cancer in his father; Dementia in his mother; Heart Disease in his father; High Blood Pressure in his father.     Allergies: Nsaids, Prednisone, Statins, and Pcn [penicillins]    Current Facility-Administered Medications Decreased functional mobility    Disposition: Home    Prognosis: good     Rehabilitation goals: To return patient to home setting at their prior level of function. This patient's IRF admission is reasonable and necessary to optimize their medical status, maximize their functional improvement, and ensure a maximally safe discharge. POST ADMISSION PHYSICIAN EVALUATION  The patient has agreed to being admitted to our comprehensive inpatient  rehabilitation facility consisting of at least 180 minutes of therapy a day,  5 out of 7 days a week. The patient/family has a good understanding of our discharge process. The  patient has potential to make improvement and is in need of at least two of  the following multidisciplinary therapies including but not limited to  physical, occupational, respiratory, and speech, nutritional services, wound care, and prosthetics and orthotics. Given the patients complex condition  and risk of further medical complications, rehabilitation services cannot be  safely provided at a lower level of care such as a skilled nursing facility. I have compared the patients medical and functional status at the time of the  preadmission screening and the same on this date, and there are no significant changes except as documented below in the assessment and plan. By signing this document, I acknowledge that I have personally performed a  full physical examination on this patient within 24 hours of admission to  this inpatient rehabilitation facility and have determined the patient to be  able to tolerate the above course of treatment at an intensive level for a  reasonable period of time. I will be completing a detailed individualized  Plan of Care for this patient by day four of the patients stay based upon the  Preadmission Screen, this Post-Admission Evaluation, and the therapy  evaluations. Rehabilitation Diagnosis:   Debility     Assessment and Plan:  1.  AAA s/p repair   -

## 2023-12-12 NOTE — PROGRESS NOTES
Incentive Spirometry education and demonstration completed by Respiratory Therapy Yes      Response to education: Very Good     Teaching Time: 5 minutes    Minimum Predicted Vital Capacity - 707 mL. Patient's Actual Vital Capacity - 1500 mL. Turning over to Nursing for routine follow-up Yes.     Electronically signed by Kasi Andrew RCP on 12/12/2023 at 2:52 PM

## 2023-12-12 NOTE — PROGRESS NOTES
Nutrition Note    RECOMMENDATIONS  PO Diet: Regular as tolerated  ONS: N/a     NUTRITION ASSESSMENT   Pt admitted for strength & conditioning d/t debility s/p surgery. Receives a regular diet with po intake 100% x 1st meal in ARU. Wts have trended down per hx, but no significant changes per ASPEN guidelines. Recommend good sources of protein to promote healing s/p AAA repair. Will monitor for consistently adequate po intake. Nutrition Related Findings: LBM 12/4; +BS; +1 generalized edema. Wounds: Surgical Incision  Nutrition Education:  Education not indicated   Nutrition Goals: PO intake 75% or greater     MALNUTRITION ASSESSMENT      Malnutrition Status: No malnutrition    NUTRITION DIAGNOSIS   Increased nutrient needs related to increase demand for energy/nutrients as evidenced by wounds (surgical)      CURRENT NUTRITION THERAPIES  ADULT DIET; Regular     PO Intake: %   PO Supplement Intake:None Ordered    ANTHROPOMETRICS  Current Height: 175.3 cm (5' 9\")  Current Weight - Scale: 75 kg (165 lb 5.5 oz)    Ideal Body Weight (IBW): 160 lbs  (73 kg)        BMI: 24.4    The patient will be monitored per nutrition standards of care. Consult dietitian if additional nutrition interventions are needed prior to RD reassessment.      Rosie Estrella, ABDOULAYE, LD    Contact: [phone]

## 2023-12-12 NOTE — PROGRESS NOTES
4 Eyes Skin Assessment     NAME:  Ann Troyer OF BIRTH:  1965  MEDICAL RECORD NUMBER:  [id number]    The patient is being assessed for  Admission    I agree that at least one RN has performed a thorough Head to Toe Skin Assessment on the patient. ALL assessment sites listed below have been assessed. Areas assessed by both nurses:    Head, Face, Ears, Shoulders, Back, Chest, Arms, Elbows, Hands, Sacrum. Buttock, Coccyx, Ischium, Legs. Feet and Heels, and Under Medical Devices         Does the Patient have a Wound? Yes wound(s) were present on assessment.  LDA wound assessment was Initiated and completed by RN     Surgical wound  Diony Prevention initiated by RN: No  Wound Care Orders initiated by RN: No    Pressure Injury (Stage 3,4, Unstageable, DTI, NWPT, and Complex wounds) if present, place Wound referral order by RN under : No    New Ostomies, if present place, Ostomy referral order under : No     Nurse 1 eSignature: Electronically signed by Mirtha Montes RN on 12/11/23 at 8:10 PM EST    **SHARE this note so that the co-signing nurse can place an eSignature**    Nurse 2 eSignature: Electronically signed by Nas Hernandez RN on 12/12/23 at 1:52 AM EST

## 2023-12-12 NOTE — PROGRESS NOTES
ARU Admission Assessment    Ethnicity  \"Are you of , /a, or Argentine origin? \"  Check all that apply:  [x] A. No, not of , /a, or [place] Origin  [] B.  Yes, [place] American, Chicano/a  [] C.  Yes, [facility]  [] D.  Yes, [place]  [] E.  Yes, another , , or Argentine origin  [] X. Patient unable to respond  [] Y. Patient declines to respond    Race  \"What is your race? \"  Check all that apply:  [x] A. White  [] B. Black or   [] C. American Krys or Lucas Native  [] D.  Krys  [] E. [place]  [] F. Peruvian  [] G. [place]  [] Yung Rich  [] I. [place]  [] J.  Other   [] K.   [] L. Faroese or Jeane  [] M. Palestinian  [] N. Other -[address] [place] [address]  [] X. Patient unable to respond  [] Y. Patient declines to respond  [] Z. None of the above    Language  A. \"What is your preferred language? \"   English    B. \"Do you need or want an  to communicate with a doctor or health care staff? \"  Check only one:  [x] 0. No  [] 1. Yes  [] 9. Unable to determine    Transportation  \"Has lack of transportation kept you from medical appointments, meetings, work, or from getting things needed for daily living? \"Check all that apply:  [] A.  Yes, it has kept me from medical appointments or from getting my medications  [] B.  Yes, it has kept me from non-medical meetings, appointments, work, or from getting things that I need  [x] C.  No  [] X. Patient unable to respond  [] Y. Patient declines to respond    Hearing  Ability to hear (with hearing aid or hearing appliances if normally used)  [x]  0. Adequate - no difficulty in normal conversation, social interaction, listening to TV  []  1. Minimal difficulty - difficulty in some environments (e.g. when person speaks softly or setting is noisy)  []  2. Moderate difficulty - speaker has to increase volume and speak distinctly   []  3.   Highly impaired - absence of Z.  None of the above    High Risk Drug Classes:  Use and Indication    Is taking: Check if the pt is taking any medications by pharmacological classification, not how it is used, in the following classes  Indication noted: If column 1 is checked, check if there is an indication noted for all meds in the drug class Is taking  (check all that apply) Indication noted (check all that apply)   Antipsychotic [] []   Anticoagulant [x] [x]   Antibiotic [x] [x]   Opioid [x] [x]   Antiplatelet [x] [x]   Hypoglycemic (including insulin) [] []   None of the above []     Special Treatments, Procedures, and Programs    Check all of the following treatments, procedures, and programs that apply on admission. On admission (check all that apply)   Cancer Treatments   A1. Chemotherapy []           A2. IV []           A3. Oral []           A10. Other []   B1. Radiation []   Respiratory Therapies   C1. Oxygen Therapy []           C2. Continuous (continuously for at least 14 hours per day) []           C3. Intermittent []           C4. High-concentration []   D1. Suctioning (Does not include oral suctioning) []           D2. Scheduled []           D3. As needed []   E1. Tracheostomy Care []   F1. Invasive Mechanical Ventilator (ventilator or respirator) []   G1. Non-invasive Mechanical Ventilator []           G2. BiPAP []           G3. CPAP []   Other   H1. IV Medications (Do not include sub Q pumps, flushes, Dextrose 50% or lactated ringers) []           H2. Vasoactive medications []           H3. Antibiotics []           H4. Anticoagulation []           H10. Other []   I1. Transfusions []   J1. Dialysis []           J2. Hemodialysis []           J3. Peritoneal dialysis []   O1. IV access (including a catheter in a vein) []           O2. Peripheral [x]           O3. Midline []           O4.  Central (PICC, tunneled, port) []      None of the above (select if no Cancer, Respiratory, or Other boxes are checked) []     The above items have

## 2023-12-12 NOTE — PROGRESS NOTES
Pt states that Acetylcysteine is making him cough uncontrollably when inhaled. He refuses to take it any more.

## 2023-12-12 NOTE — PROGRESS NOTES
[address] [facility] Department   Phone: [phone]    Physical Therapy    [x] Initial Evaluation            [] Daily Treatment Note         [] Discharge Summary      Patient: Graham Garces   : 1965   MRN: [id number]   Date of Service:  2023  Admitting Diagnosis: Debility  Current Admission Summary: Alejandra Gilman is a 59year old male with a past medical history significant for CAD, COPD, HTN, HLD, neuropathy, and tobacco use who presented to [facility] on 23 for planned open AAA repair and left renal artery implantation. Past Medical History:  has a past medical history of Abdominal aortic aneurysm (AAA) ([address]), Anxiety, Arthritis, Bulging disc, CAD (coronary artery disease), COPD (chronic obstructive pulmonary disease) ([address]), Dependent personality disorder ([address]), Diverticulosis, Enlarged prostate, History of percutaneous left heart catheterization, Hyperlipidemia, Hypertension, Leukocytosis, MDD (major depressive disorder), Neuropathy, Tobacco abuse, and Weight loss. Past Surgical History:  has a past surgical history that includes hernia repair; Colonoscopy; Upper gastrointestinal endoscopy; Inguinal hernia repair (12); Umbilical hernia repair (3/7/14); Cystocopy (2014); Abdominal aortic aneurysm repair (Left, 2023); and bronchoscopy (N/A, 2023).   Discharge Recommendations: outpatient PT  DME Required For Discharge: DME to be determined pending patient progress  Precautions/Restrictions: medium fall risk  Weight Bearing Restrictions: weight bearing as tolerated  [] Right Upper Extremity  [] Left Upper Extremity [] Right Lower Extremity  [] Left Lower Extremity     Required Braces/Orthotics: no braces required   [] Right  [] Left  Positional Restrictions:no positional restrictions    Pre-Admission Information   Lives With: family, sister- plans to live with her for at least the next 6 months and then would like to find his own place 150 ft with use of LRAD at [facility]  Patient will ascend/descend 12 stairs with (B) handrail at modified independent  Patient will complete car transfer at modified independent    Above goals reviewed on 12/12/2023. All goals are ongoing at this time unless indicated above. Therapy Session Time      Individual Group Co-treatment   Time In  1000       Time Out  1100       Minutes  60         Second Session Therapy Time:   Individual Concurrent Group Co-treatment   Time In 2319         Time Out 1445         Minutes 30           Timed Code Treatment Minutes:  75    Total Treatment Minutes:  90         Electronically Signed By: Benito Olvera, LYUBOV [facility]   Therapist was present, directed the patient's care, made skilled judgement, and was responsible for assessment and treatment of the patient. Co-signed and supervised by:  Benito Olvera DPT [id number]      2nd session: Jagdish Bush PT, DPT [id number]

## 2023-12-13 LAB
ANION GAP SERPL CALCULATED.3IONS-SCNC: 6 MMOL/L (ref 3–16)
BASOPHILS # BLD: 0 K/UL (ref 0–0.2)
BASOPHILS NFR BLD: 0.6 %
BUN SERPL-MCNC: 15 MG/DL (ref 7–20)
CALCIUM SERPL-MCNC: 8.5 MG/DL (ref 8.3–10.6)
CHLORIDE SERPL-SCNC: 103 MMOL/L (ref 99–110)
CO2 SERPL-SCNC: 27 MMOL/L (ref 21–32)
CREAT SERPL-MCNC: 0.8 MG/DL (ref 0.9–1.3)
DEPRECATED RDW RBC AUTO: 12.7 % (ref 12.4–15.4)
EOSINOPHIL # BLD: 0.3 K/UL (ref 0–0.6)
EOSINOPHIL NFR BLD: 4.2 %
GFR SERPLBLD CREATININE-BSD FMLA CKD-EPI: >60 ML/MIN/{1.73_M2}
GLUCOSE SERPL-MCNC: 104 MG/DL (ref 70–99)
HCT VFR BLD AUTO: 28.5 % (ref 40.5–52.5)
HGB BLD-MCNC: 9.8 G/DL (ref 13.5–17.5)
LYMPHOCYTES # BLD: 1.4 K/UL (ref 1–5.1)
LYMPHOCYTES NFR BLD: 18.9 %
MCH RBC QN AUTO: 34 PG (ref 26–34)
MCHC RBC AUTO-ENTMCNC: 34.3 G/DL (ref 31–36)
MCV RBC AUTO: 99.1 FL (ref 80–100)
MONOCYTES # BLD: 0.8 K/UL (ref 0–1.3)
MONOCYTES NFR BLD: 10.9 %
NEUTROPHILS # BLD: 4.9 K/UL (ref 1.7–7.7)
NEUTROPHILS NFR BLD: 65.4 %
PLATELET # BLD AUTO: 481 K/UL (ref 135–450)
PMV BLD AUTO: 7.1 FL (ref 5–10.5)
POTASSIUM SERPL-SCNC: 3.8 MMOL/L (ref 3.5–5.1)
RBC # BLD AUTO: 2.87 M/UL (ref 4.2–5.9)
SODIUM SERPL-SCNC: 136 MMOL/L (ref 136–145)
WBC # BLD AUTO: 7.5 K/UL (ref 4–11)

## 2023-12-13 PROCEDURE — 97530 THERAPEUTIC ACTIVITIES: CPT

## 2023-12-13 PROCEDURE — 6360000002 HC RX W HCPCS: Performed by: PHYSICAL MEDICINE & REHABILITATION

## 2023-12-13 PROCEDURE — 6370000000 HC RX 637 (ALT 250 FOR IP): Performed by: PHYSICAL MEDICINE & REHABILITATION

## 2023-12-13 PROCEDURE — 80048 BASIC METABOLIC PNL TOTAL CA: CPT

## 2023-12-13 PROCEDURE — 85025 COMPLETE CBC W/AUTO DIFF WBC: CPT

## 2023-12-13 PROCEDURE — 97535 SELF CARE MNGMENT TRAINING: CPT

## 2023-12-13 PROCEDURE — 94640 AIRWAY INHALATION TREATMENT: CPT

## 2023-12-13 PROCEDURE — 1280000000 HC REHAB R&B

## 2023-12-13 RX ADMIN — ASPIRIN 81 MG: 81 TABLET, COATED ORAL at 09:07

## 2023-12-13 RX ADMIN — CEFUROXIME AXETIL 500 MG: 250 TABLET ORAL at 21:33

## 2023-12-13 RX ADMIN — LEVALBUTEROL 0.63 MG: 1.25 SOLUTION, CONCENTRATE RESPIRATORY (INHALATION) at 14:24

## 2023-12-13 RX ADMIN — PANTOPRAZOLE SODIUM 40 MG: 40 TABLET, DELAYED RELEASE ORAL at 05:42

## 2023-12-13 RX ADMIN — OXYCODONE 5 MG: 5 TABLET ORAL at 09:06

## 2023-12-13 RX ADMIN — CLONAZEPAM 1 MG: 0.5 TABLET ORAL at 21:34

## 2023-12-13 RX ADMIN — ATORVASTATIN CALCIUM 40 MG: 40 TABLET, FILM COATED ORAL at 21:34

## 2023-12-13 RX ADMIN — CARVEDILOL 3.12 MG: 3.12 TABLET, FILM COATED ORAL at 09:07

## 2023-12-13 RX ADMIN — DOCUSATE SODIUM 100 MG: 100 CAPSULE, LIQUID FILLED ORAL at 21:35

## 2023-12-13 RX ADMIN — CLOPIDOGREL BISULFATE 75 MG: 75 TABLET ORAL at 09:07

## 2023-12-13 RX ADMIN — OXYCODONE 5 MG: 5 TABLET ORAL at 21:34

## 2023-12-13 RX ADMIN — OXYCODONE 5 MG: 5 TABLET ORAL at 15:14

## 2023-12-13 RX ADMIN — LEVALBUTEROL 0.63 MG: 1.25 SOLUTION, CONCENTRATE RESPIRATORY (INHALATION) at 22:22

## 2023-12-13 RX ADMIN — SENNOSIDES 8.6 MG: 8.6 TABLET, COATED ORAL at 21:35

## 2023-12-13 RX ADMIN — OXYCODONE 5 MG: 5 TABLET ORAL at 01:29

## 2023-12-13 RX ADMIN — ENOXAPARIN SODIUM 40 MG: 100 INJECTION SUBCUTANEOUS at 09:06

## 2023-12-13 RX ADMIN — CARVEDILOL 3.12 MG: 3.12 TABLET, FILM COATED ORAL at 21:34

## 2023-12-13 RX ADMIN — CEFUROXIME AXETIL 500 MG: 250 TABLET ORAL at 09:06

## 2023-12-13 RX ADMIN — CLONAZEPAM 1 MG: 0.5 TABLET ORAL at 09:10

## 2023-12-13 RX ADMIN — DOCUSATE SODIUM 100 MG: 100 CAPSULE, LIQUID FILLED ORAL at 09:06

## 2023-12-13 RX ADMIN — LEVALBUTEROL 0.63 MG: 1.25 SOLUTION, CONCENTRATE RESPIRATORY (INHALATION) at 05:52

## 2023-12-13 RX ADMIN — MUPIROCIN: 20 OINTMENT TOPICAL at 21:35

## 2023-12-13 ASSESSMENT — PAIN SCALES - GENERAL
PAINLEVEL_OUTOF10: 8
PAINLEVEL_OUTOF10: 4
PAINLEVEL_OUTOF10: 4
PAINLEVEL_OUTOF10: 10
PAINLEVEL_OUTOF10: 8

## 2023-12-13 ASSESSMENT — PAIN DESCRIPTION - DESCRIPTORS
DESCRIPTORS: ACHING
DESCRIPTORS: ACHING
DESCRIPTORS: THROBBING

## 2023-12-13 ASSESSMENT — PAIN DESCRIPTION - LOCATION
LOCATION: ABDOMEN

## 2023-12-13 ASSESSMENT — PAIN DESCRIPTION - ORIENTATION
ORIENTATION: LEFT
ORIENTATION: LEFT
ORIENTATION: MID;LEFT
ORIENTATION: LEFT

## 2023-12-13 ASSESSMENT — PAIN DESCRIPTION - FREQUENCY: FREQUENCY: CONTINUOUS

## 2023-12-13 ASSESSMENT — PAIN SCALES - WONG BAKER
WONGBAKER_NUMERICALRESPONSE: 0

## 2023-12-13 ASSESSMENT — PAIN - FUNCTIONAL ASSESSMENT
PAIN_FUNCTIONAL_ASSESSMENT: ACTIVITIES ARE NOT PREVENTED

## 2023-12-13 ASSESSMENT — PAIN DESCRIPTION - PAIN TYPE: TYPE: SURGICAL PAIN

## 2023-12-13 ASSESSMENT — PAIN DESCRIPTION - ONSET: ONSET: ON-GOING

## 2023-12-13 NOTE — PATIENT CARE CONFERENCE
[facility]  Inpatient Rehabilitation  Weekly Team Conference Note    Patient Name: Zully Delacruz        MRN: [id number]    : 1965  (59 y.o.)  Gender: male           The team conference for this patient was held on 23 at 80 am and led by:  Aaron Reyes DO     CASE MANAGEMENT:  Assessment:   Patient is a 59year old Male who admitted to ARU on 2023 with the admitting diagnosis of  Debility . Patient resides at home with his sister. Patient is presently not employed and has applied for disability benefits. Patient would benefit from skilled PT/OT to promote increased safety and independence in order to return to his prior level of functioning. Patient anticipates discharging Home with outpatient therapy services and with recommended DME.      PHYSICAL THERAPY:    Bed Mobility:  Supine to Sit: stand by assistance  Sit to Supine: stand by assistance  Rolling Left: stand by assistance  Rolling Right: stand by assistance  Comments:  Transfers:  Sit to stand transfer: stand by assistance  Stand to sit transfer: stand by assistance  Stand pivot transfer: contact guard assistance  Toilet transfer: stand by assistance  Comments:  Ambulation:  Surface:level surface  Assistive Device: rolling walker  Assistance: contact guard assistance  Distance: 145'   Gait Mechanics: decreased gait speed, decreased step height  Comments:    Ambulation Trial 2  Surface:level surface  Assistive Device: no device  Assistance: contact guard assistance  Distance: 145'  Gait Mechanics: same as above and no arm swing, guarded gait but improved as he kept walking   Comments:  Stair Mobility:  Number of Steps: 4  Step Height: 6 inch  Hand Rails: (B) handrail  Assistance: contact guard assistance  Comments: Pt stated he felt dizzy after completing 4 so pt was seated and BP taken and read 109/69    QM:  Roll Left and Right  Assistance Needed: Supervision or touching assistance  CARE Score: 4  Discharge ft with use of LRAD at [facility]  Patient will ascend/descend 12 stairs with (B) handrail at modified independent  Patient will complete car transfer at [facility]     Above goals reviewed on 12/12/2023. All goals are ongoing at this time unless indicated above. These goals were reviewed with this patient at the time of assessment and Josselin Goff is in agreement. Plan of Care: Pt to be seen 5 out of 7 days per week per ARU protocol ( 90 minutes with PT)                  OCCUPATIONAL THERAPY:    ADL:   Grooming: SUP- oral care at sink  Upper Extremity Bathing: moderate assistance  Lower Extremity Bathing: contact guard assistance moderate assistance   Bathing Comments: Pt completed upper body bathing while seated on TTB. Pt completed ly-hygiene while seated then required max A to wash upper and lower back/buttocks. Lower back and buttocks washed while pt was in stance with CGA. Pt required mod A to wash arms for thoroughness secondary to decreased ROM/strength.     Upper Extremity Dressing: SBA  Lower Extremity Dressing: maximum assistance; SUP with sock aide to don socks  Toileting: SUP    Toilet Transfers:   SBA/SUP    Tub/ShowerTransfers:   SBA     QM:  Eating  Assistance Needed: Independent  CARE Score: 6  Discharge Goal: Independent  Oral Hygiene  Assistance Needed: Setup or clean-up assistance  CARE Score: 5  Discharge Goal: [address] needed: Independent  CARE Score: 6  Discharge Goal: Independent  Toilet Transfer  Assistance needed: Setup or clean-up assistance  CARE Score: 5  Discharge Goal: Independent  Shower/Bathe Self  Assistance Needed: Partial/moderate assistance  CARE Score: 3  Discharge Goal: Independent  Upper Body Dressing  Assistance Needed: Partial/moderate assistance  CARE Score: 3  Discharge Goal: Independent  Lower Body Dressing  Assistance Needed: Substantial/maximal assistance  CARE Score: 2  Discharge Goal:

## 2023-12-13 NOTE — PROGRESS NOTES
device  Assistance: contact guard assistance  Distance: 145'  Gait Mechanics: same as above and no arm swing, guarded gait but improved as he kept walking   Comments:  Stair Mobility:  Stair mobility not completed on this date. Comments:   Wheelchair Mobility:  No w/c mobility completed on this date. Comments:  Balance:  Static Sitting Balance: good: independent with functional balance in unsupported position  Dynamic Sitting Balance: good: independent with functional balance in unsupported position  Static Standing Balance: fair: maintains balance at CGA without use of UE support  Dynamic Standing Balance: fair (-): maintains balance at CGA with use of UE support  Comments:    Other Therapeutic Interventions  First session: Performed functional mobility as noted above. Performed seated stepper x5 min. Performed standing balance on airex pad reaching unweighted ball OH and chest press x12 each. BP reads 121/78. Performed standing balance on airex pad throwing sensory ball to trampoline and catching x10 and in staggered stance x10 each foot in front. Performed marching on airex pad x8 each leg. Pt reported dizziness so BP was taken and read 109/66. Performed step ups on 6in stair x6 and then x8. BP reads 107/71. Performed L stepping 10' x2 and FW/BW 10' x2. Pt ambulated as described in trial 2 back to room. Pt remained in recliner with alarm on and all needs in reach. Second session: Pt sitting in recliner on arrival. Pt ambulated 270' with RW and 169' without RW with mechanics as listed above. Performed stepping over mini hurdles FW step to pattern x2, step through pattern x2, L x2. Pt ambulated 127' without RW and then 270' without RW for endurance and to improve dynamic balance without UE support. Pt with some minimal arm swing, but still walks with guarded gait. Pt remained in recliner with alarm on and all needs in reach.      Functional Outcomes                 Cognition  WFL  Orientation:    alert and Group Co-treatment   Time In  730       Time Out  830       Minutes  60         Second Session Therapy Time:   Individual Concurrent Group Co-treatment   Time In  1310         Time Out  1340         Minutes  30           Timed Code Treatment Minutes:  60+30    Total Treatment Minutes:  80         Electronically Signed By: ARIANA Ruano  Therapist was present, directed the patient's care, made skilled judgement, and was responsible for assessment and treatment of the patient. Co-signed and supervised by:  Carl Ramírez DPT [id number]

## 2023-12-13 NOTE — PROGRESS NOTES
Morning assessment completed, schedule meds given, oxy x 1 for pain, The care plan and education has been reviewed and mutually agreed upon with the patient. Pt remains free from falls. Fall precautions in place--bed in lowest position, call light within reach, bed alarm in use. Pt aware to call for assistance before getting up.

## 2023-12-13 NOTE — CARE COORDINATION
Social Work Admission Assessment    Objective:  Met with pt to complete initial assessment and review role of  in rehab process. Pt oriented to unit. Pt states understanding of this. Current Home Situation:  Patient lives at with his sister. They live in a one level [address] home. Patient reports that his sister works 2 jobs so he is at home most of the day alone. Pt's plans are: Patient has applied for his SSDI. Accessibility to community resources/transportation: Patient is not active with any community resource programs./Patient's sister will assist patient with transportation. Has pt experienced a recent loss or signigicant life event that would impact their care or ability to participate? No    Has pt ever been treated for emotional disorders? No    How does pt and family cope with stressful events and this hospitalization? Patient reports that he uses his mady to cope with stressful life events and this hospitalization. Special Problem Areas: None     Discharge Plan:Home with Home Health Care    Impression/Plan:  Alejandra Gilman is a 59year old male that has been admitted to ARU. Provided patient with this SW's card to contact as needed. Will continue to follow for support and discharge planning.      Electronically signed by MARISOL Marrero on 12/13/2023 at 5:09 PM

## 2023-12-14 PROCEDURE — 6360000002 HC RX W HCPCS: Performed by: PHYSICAL MEDICINE & REHABILITATION

## 2023-12-14 PROCEDURE — 6370000000 HC RX 637 (ALT 250 FOR IP): Performed by: PHYSICAL MEDICINE & REHABILITATION

## 2023-12-14 PROCEDURE — 94640 AIRWAY INHALATION TREATMENT: CPT

## 2023-12-14 PROCEDURE — 97535 SELF CARE MNGMENT TRAINING: CPT

## 2023-12-14 PROCEDURE — 97112 NEUROMUSCULAR REEDUCATION: CPT

## 2023-12-14 PROCEDURE — 97530 THERAPEUTIC ACTIVITIES: CPT

## 2023-12-14 PROCEDURE — 1280000000 HC REHAB R&B

## 2023-12-14 RX ORDER — SENNOSIDES A AND B 8.6 MG/1
1 TABLET, FILM COATED ORAL 2 TIMES DAILY PRN
Status: ACTIVE | OUTPATIENT
Start: 2023-12-14

## 2023-12-14 RX ADMIN — OXYCODONE 5 MG: 5 TABLET ORAL at 10:43

## 2023-12-14 RX ADMIN — CARVEDILOL 3.12 MG: 3.12 TABLET, FILM COATED ORAL at 20:56

## 2023-12-14 RX ADMIN — CEFUROXIME AXETIL 500 MG: 250 TABLET ORAL at 08:52

## 2023-12-14 RX ADMIN — OXYCODONE 5 MG: 5 TABLET ORAL at 05:44

## 2023-12-14 RX ADMIN — ASPIRIN 81 MG: 81 TABLET, COATED ORAL at 08:52

## 2023-12-14 RX ADMIN — OXYCODONE 5 MG: 5 TABLET ORAL at 20:56

## 2023-12-14 RX ADMIN — CLONAZEPAM 1 MG: 0.5 TABLET ORAL at 20:56

## 2023-12-14 RX ADMIN — MUPIROCIN: 20 OINTMENT TOPICAL at 20:58

## 2023-12-14 RX ADMIN — CLOPIDOGREL BISULFATE 75 MG: 75 TABLET ORAL at 08:52

## 2023-12-14 RX ADMIN — CLONAZEPAM 1 MG: 0.5 TABLET ORAL at 10:43

## 2023-12-14 RX ADMIN — CARVEDILOL 3.12 MG: 3.12 TABLET, FILM COATED ORAL at 08:52

## 2023-12-14 RX ADMIN — OXYCODONE 5 MG: 5 TABLET ORAL at 16:36

## 2023-12-14 RX ADMIN — PANTOPRAZOLE SODIUM 40 MG: 40 TABLET, DELAYED RELEASE ORAL at 05:44

## 2023-12-14 RX ADMIN — ATORVASTATIN CALCIUM 40 MG: 40 TABLET, FILM COATED ORAL at 20:56

## 2023-12-14 RX ADMIN — ENOXAPARIN SODIUM 40 MG: 100 INJECTION SUBCUTANEOUS at 08:52

## 2023-12-14 RX ADMIN — LEVALBUTEROL 0.63 MG: 1.25 SOLUTION, CONCENTRATE RESPIRATORY (INHALATION) at 15:14

## 2023-12-14 RX ADMIN — CEFUROXIME AXETIL 500 MG: 250 TABLET ORAL at 20:56

## 2023-12-14 RX ADMIN — LEVALBUTEROL 0.63 MG: 1.25 SOLUTION, CONCENTRATE RESPIRATORY (INHALATION) at 05:59

## 2023-12-14 RX ADMIN — LEVALBUTEROL 0.63 MG: 1.25 SOLUTION, CONCENTRATE RESPIRATORY (INHALATION) at 21:30

## 2023-12-14 RX ADMIN — DOCUSATE SODIUM 100 MG: 100 CAPSULE, LIQUID FILLED ORAL at 08:52

## 2023-12-14 ASSESSMENT — PAIN DESCRIPTION - FREQUENCY
FREQUENCY: CONTINUOUS
FREQUENCY: CONTINUOUS

## 2023-12-14 ASSESSMENT — PAIN DESCRIPTION - DESCRIPTORS
DESCRIPTORS: ACHING

## 2023-12-14 ASSESSMENT — PAIN SCALES - WONG BAKER
WONGBAKER_NUMERICALRESPONSE: 0
WONGBAKER_NUMERICALRESPONSE: 0

## 2023-12-14 ASSESSMENT — PAIN SCALES - GENERAL
PAINLEVEL_OUTOF10: 2
PAINLEVEL_OUTOF10: 2
PAINLEVEL_OUTOF10: 6
PAINLEVEL_OUTOF10: 8
PAINLEVEL_OUTOF10: 5

## 2023-12-14 ASSESSMENT — PAIN DESCRIPTION - PAIN TYPE
TYPE: SURGICAL PAIN
TYPE: ACUTE PAIN
TYPE: SURGICAL PAIN

## 2023-12-14 ASSESSMENT — PAIN DESCRIPTION - ORIENTATION
ORIENTATION: MID;LEFT
ORIENTATION: MID;LEFT
ORIENTATION: LEFT
ORIENTATION: LEFT;MID
ORIENTATION: LEFT

## 2023-12-14 ASSESSMENT — PAIN DESCRIPTION - ONSET
ONSET: ON-GOING
ONSET: ON-GOING

## 2023-12-14 ASSESSMENT — PAIN - FUNCTIONAL ASSESSMENT
PAIN_FUNCTIONAL_ASSESSMENT: ACTIVITIES ARE NOT PREVENTED

## 2023-12-14 ASSESSMENT — PAIN DESCRIPTION - LOCATION
LOCATION: ABDOMEN

## 2023-12-14 NOTE — PROGRESS NOTES
[address] [facility] Department   Phone: [phone]    Physical Therapy    [] Initial Evaluation            [x] Daily Treatment Note         [] Discharge Summary      Patient: Denise Willard   : 1965   MRN: [id number]   Date of Service:  2023  Admitting Diagnosis: Debility  Current Admission Summary: Elo Headley is a 59year old male with a past medical history significant for CAD, COPD, HTN, HLD, neuropathy, and tobacco use who presented to [facility] on 23 for planned open AAA repair and left renal artery implantation. Past Medical History:  has a past medical history of Abdominal aortic aneurysm (AAA) ([address]), Anxiety, Arthritis, Bulging disc, CAD (coronary artery disease), COPD (chronic obstructive pulmonary disease) ([address]), Dependent personality disorder ([address]), Diverticulosis, Enlarged prostate, History of percutaneous left heart catheterization, Hyperlipidemia, Hypertension, Leukocytosis, MDD (major depressive disorder), Neuropathy, Tobacco abuse, and Weight loss. Past Surgical History:  has a past surgical history that includes hernia repair; Colonoscopy; Upper gastrointestinal endoscopy; Inguinal hernia repair (12); Umbilical hernia repair (3/7/14); Cystocopy (2014); Abdominal aortic aneurysm repair (Left, 2023); and bronchoscopy (N/A, 2023).   Discharge Recommendations: outpatient PT  DME Required For Discharge: DME to be determined pending patient progress  Precautions/Restrictions: medium fall risk  Weight Bearing Restrictions: weight bearing as tolerated  [] Right Upper Extremity  [] Left Upper Extremity [] Right Lower Extremity  [] Left Lower Extremity     Required Braces/Orthotics: no braces required   [] Right  [] Left  Positional Restrictions:no positional restrictions    Pre-Admission Information   Lives With: family, sister- plans to live with her for at least the next 6 months and then would like to find his own place x5 each leg with intermittent balance assist using ballet bar. Pt ambulated 145' with no device SBA to room where pt remained in recliner with alarm on and all needs in reach. Functional Outcomes                 Cognition  WFL  Orientation:    alert and oriented x 4  Command Following:   [facility]    Education  Barriers To Learning: none  Patient Education: patient educated on goals, PT role and benefits, plan of care, functional mobility training, proper use of assistive device/equipment, energy conservation, disease specific education  Learning Assessment:  patient verbalizes and demonstrates understanding    Assessment  Activity Tolerance: pt requiring more rest breaks due to dizziness and low blood pressure   Impairments Requiring Therapeutic Intervention: decreased functional mobility, decreased ADL status, decreased strength, decreased endurance, decreased balance  Prognosis: good  Clinical Assessment: Pt presents post AAA surgical repair with decreased B LE strength and balance deficits. Pt reports some dizziness with activity so time taken to monitor blood pressure throughout session, but has improved regulation throughout today's sessions. Pt improving ambulation distance and mechanics without RW. Pt challenged with some higher level balance activities this date, but able to catch his own balance if needed. Pt will benefit from skilled PT services to address deficits and promote safe return to the home environment.   Safety Interventions: patient left in bed, bed alarm in place, call light within reach, gait belt, and patient at risk for falls    Plan  Frequency: 5 x/week, 90 min/day  Current Treatment Recommendations: strengthening, balance training, functional mobility training, gait training, stair training, endurance training, ADL/self-care training, and IADL training    Goals  Patient Goals: Return to taking walks with dogs and grandchildren   Short Term Goals:  Time Frame: 7-10 days  Patient will

## 2023-12-14 NOTE — CARE COORDINATION
Team conference held today. Spoke with patient  to discuss progress with therapy, barriers to discharge, and plans to return home. Estimated discharge date set for 12/19/2023. Patient anticipating discharging with home health care services. Patient states that he does not drive and cannot participate in out-patient therapy. Will continue to follow for support and discharge planning.     Electronically signed by MARISOL Marrero on 12/14/2023 at 4:47 PM

## 2023-12-14 NOTE — PROGRESS NOTES
Assessment complete. Alert, oriented x4. Reports pain at surgical incision from mid to left abdomen. Given PRN oxycodone 5 mg. Incision spanning mid to left abdomen clean, dry, intact, and well-approximated with staples in place. Smaller incision inferior to the aforementioned incision clean, dry, intact, and well-approximated with suture in place. Given PRN clonazepam per patient request to aid in anxiety relief and to promote rest. The care plan and education has been reviewed and mutually agreed upon with the patient. In bed, alarm on, bed in lowest position, call light and table within reach. No further needs expressed at this time. 0545  Given PRN oxycodone to aid in pain control. Ambulated to bathroom using walker and standby assistance. Steady on feet. Up to chair. Alarm engaged.

## 2023-12-14 NOTE — PROGRESS NOTES
using RW to/from therapy area with SUP and no verbal cues for safety. Additionally, pt completed functional mobility without RW to/from laundry room (~100 ft) to transport laundry from [facility] to hospital room, CGA. Pt folding laundry while in static stance w/ SBA (~1 min) and then placed in closet w/ SBA (~1 min). Pt tolerated well w/ no rest breaks or LOB. Pt completed dynamic standing balance 2x to throw 5 rings on target and then retrieve from ground w/ use of reacher, SBA. No UE support needed. No LOB. Pt tolerated well w/ one short rest break. Pt completed 5x washing, drying, and putting away dishes while in static stance w/ no UE, ~5 min). No verbal cues required and no LOB. Sit>sup in flat bed w/ log roll technique w/ SUP and abdominal pillow. Pt left in hosptial bed w/ all needs met, call light activated, and all needs met. Cognition  WFL  Orientation:    alert and oriented x 4  Command Following:   [facility]     Education  Barriers To Learning: none  Patient Education: patient educated on goals, OT role and benefits, plan of care, proper use of assistive device/equipment, adaptive device training, energy conservation, transfer training, discharge recommendations  Learning Assessment:  patient verbalizes and demonstrates understanding    Assessment  Activity Tolerance: fair- pt tolerated mobility with no rest breaks required throughout. Impairments Requiring Therapeutic Intervention: decreased functional mobility, decreased ADL status, decreased ROM, decreased strength, decreased endurance, decreased balance, decreased IADL, increased pain  Prognosis: good  Clinical Assessment: Pt currently requires SUP/SBA for UB ADLs and SBA for LB ADLs w/o the use of AE equipment. Pt requires SBA for simple IADL tasks w/ no RW. Pt requires SBA/CGA w/ functional mobility during ADLs. Pt primarily limited by decreased endurance, pain from incision site, and dizziness.   Pt would benefit from continued skilled OT in acute rehab setting to return to [facility]. Safety Interventions: patient left in chair, chair alarm in place, call light within reach, gait belt, patient at risk for falls, nurse notified, and family/caregiver present    Plan  Frequency: 5 x/week, 90 min/day  Current Treatment Recommendations: strengthening, ROM, balance training, functional mobility training, transfer training, gait training, endurance training, patient/caregiver education, ADL/self-care training, and safety education    Goals  Patient Goals: to return home   Short Term Goals:  Time Frame: 2 weeks  Patient will complete upper body ADL at Independent   Patient will complete lower body ADL at Independent   Patient will complete toileting at [facility]   Patient will complete grooming at Independent   Patient will complete functional transfers at Independent   Patient will complete functional mobility at Independent   Patient will complete bed mobility at Independent   Patient to maintain standing at Independent for 8-10 minutes. Patient to gather and transport IADL items at [address] [place] [address] reviewed on 12/14/2023. All goals are ongoing at this time unless indicated above.        Therapy Session Time  First Session Therapy Time:   Individual Group Co-treatment   Time In 7443      Time Out 1150      Minutes 61         Second Session Therapy Time:   Individual Group Co-treatment   Time In 1315      Time Out 1345      Minutes 30            Timed Code Treatment Minutes: 25+82    Total Treatment Minutes:  93       Electronically Signed By:  MELCHOR Pak/GERDA, [id number] 12/14/2023 12:15 PM    MELCHOR Pak/GERDA, [id number] 12/14/2023 4:13 PM

## 2023-12-14 NOTE — PROGRESS NOTES
Morning assessment completed, vss, alert and oriented, The care plan and education has been reviewed and mutually agreed upon with the patient. Pt remains free from falls. Fall precautions in place--bed in lowest position, call light within reach, bed alarm in use. Pt aware to call for assistance before getting up.

## 2023-12-15 LAB
ANION GAP SERPL CALCULATED.3IONS-SCNC: 8 MMOL/L (ref 3–16)
BASOPHILS # BLD: 0.1 K/UL (ref 0–0.2)
BASOPHILS NFR BLD: 0.7 %
BUN SERPL-MCNC: 10 MG/DL (ref 7–20)
CALCIUM SERPL-MCNC: 8.7 MG/DL (ref 8.3–10.6)
CHLORIDE SERPL-SCNC: 102 MMOL/L (ref 99–110)
CO2 SERPL-SCNC: 28 MMOL/L (ref 21–32)
CREAT SERPL-MCNC: 0.9 MG/DL (ref 0.9–1.3)
DEPRECATED RDW RBC AUTO: 12.7 % (ref 12.4–15.4)
EOSINOPHIL # BLD: 0.2 K/UL (ref 0–0.6)
EOSINOPHIL NFR BLD: 3.1 %
GFR SERPLBLD CREATININE-BSD FMLA CKD-EPI: >60 ML/MIN/{1.73_M2}
GLUCOSE SERPL-MCNC: 120 MG/DL (ref 70–99)
HCT VFR BLD AUTO: 30.4 % (ref 40.5–52.5)
HGB BLD-MCNC: 10.5 G/DL (ref 13.5–17.5)
LYMPHOCYTES # BLD: 1.2 K/UL (ref 1–5.1)
LYMPHOCYTES NFR BLD: 15.3 %
MCH RBC QN AUTO: 34.1 PG (ref 26–34)
MCHC RBC AUTO-ENTMCNC: 34.7 G/DL (ref 31–36)
MCV RBC AUTO: 98.4 FL (ref 80–100)
MONOCYTES # BLD: 0.8 K/UL (ref 0–1.3)
MONOCYTES NFR BLD: 10.5 %
NEUTROPHILS # BLD: 5.5 K/UL (ref 1.7–7.7)
NEUTROPHILS NFR BLD: 70.4 %
PLATELET # BLD AUTO: 588 K/UL (ref 135–450)
PMV BLD AUTO: 6.5 FL (ref 5–10.5)
POTASSIUM SERPL-SCNC: 4.1 MMOL/L (ref 3.5–5.1)
RBC # BLD AUTO: 3.08 M/UL (ref 4.2–5.9)
SODIUM SERPL-SCNC: 138 MMOL/L (ref 136–145)
WBC # BLD AUTO: 7.8 K/UL (ref 4–11)

## 2023-12-15 PROCEDURE — 97530 THERAPEUTIC ACTIVITIES: CPT

## 2023-12-15 PROCEDURE — 80048 BASIC METABOLIC PNL TOTAL CA: CPT

## 2023-12-15 PROCEDURE — 6360000002 HC RX W HCPCS: Performed by: PHYSICAL MEDICINE & REHABILITATION

## 2023-12-15 PROCEDURE — 1280000000 HC REHAB R&B

## 2023-12-15 PROCEDURE — 94761 N-INVAS EAR/PLS OXIMETRY MLT: CPT

## 2023-12-15 PROCEDURE — 94640 AIRWAY INHALATION TREATMENT: CPT

## 2023-12-15 PROCEDURE — 6370000000 HC RX 637 (ALT 250 FOR IP): Performed by: PHYSICAL MEDICINE & REHABILITATION

## 2023-12-15 PROCEDURE — 85025 COMPLETE CBC W/AUTO DIFF WBC: CPT

## 2023-12-15 RX ADMIN — ATORVASTATIN CALCIUM 40 MG: 40 TABLET, FILM COATED ORAL at 20:47

## 2023-12-15 RX ADMIN — CARVEDILOL 3.12 MG: 3.12 TABLET, FILM COATED ORAL at 20:48

## 2023-12-15 RX ADMIN — MUPIROCIN: 20 OINTMENT TOPICAL at 10:26

## 2023-12-15 RX ADMIN — OXYCODONE 5 MG: 5 TABLET ORAL at 09:04

## 2023-12-15 RX ADMIN — CARVEDILOL 3.12 MG: 3.12 TABLET, FILM COATED ORAL at 10:24

## 2023-12-15 RX ADMIN — OXYCODONE 5 MG: 5 TABLET ORAL at 16:13

## 2023-12-15 RX ADMIN — OXYCODONE 5 MG: 5 TABLET ORAL at 03:36

## 2023-12-15 RX ADMIN — CLONAZEPAM 1 MG: 0.5 TABLET ORAL at 20:46

## 2023-12-15 RX ADMIN — CLONAZEPAM 1 MG: 0.5 TABLET ORAL at 16:13

## 2023-12-15 RX ADMIN — OXYCODONE 5 MG: 5 TABLET ORAL at 20:46

## 2023-12-15 RX ADMIN — CEFUROXIME AXETIL 500 MG: 250 TABLET ORAL at 20:46

## 2023-12-15 RX ADMIN — ASPIRIN 81 MG: 81 TABLET, COATED ORAL at 10:24

## 2023-12-15 RX ADMIN — ENOXAPARIN SODIUM 40 MG: 100 INJECTION SUBCUTANEOUS at 10:25

## 2023-12-15 RX ADMIN — MUPIROCIN: 20 OINTMENT TOPICAL at 20:47

## 2023-12-15 RX ADMIN — LEVALBUTEROL 0.63 MG: 1.25 SOLUTION, CONCENTRATE RESPIRATORY (INHALATION) at 06:10

## 2023-12-15 RX ADMIN — CLOPIDOGREL BISULFATE 75 MG: 75 TABLET ORAL at 10:24

## 2023-12-15 RX ADMIN — LEVALBUTEROL 0.63 MG: 1.25 SOLUTION, CONCENTRATE RESPIRATORY (INHALATION) at 20:12

## 2023-12-15 RX ADMIN — CLONAZEPAM 1 MG: 0.5 TABLET ORAL at 09:04

## 2023-12-15 RX ADMIN — CEFUROXIME AXETIL 500 MG: 250 TABLET ORAL at 10:24

## 2023-12-15 RX ADMIN — MELATONIN TAB 3 MG 6 MG: 3 TAB at 20:47

## 2023-12-15 RX ADMIN — LEVALBUTEROL 0.63 MG: 1.25 SOLUTION, CONCENTRATE RESPIRATORY (INHALATION) at 15:12

## 2023-12-15 RX ADMIN — PANTOPRAZOLE SODIUM 40 MG: 40 TABLET, DELAYED RELEASE ORAL at 05:28

## 2023-12-15 ASSESSMENT — PAIN DESCRIPTION - DESCRIPTORS
DESCRIPTORS: ACHING;SORE
DESCRIPTORS: SORE
DESCRIPTORS: ACHING
DESCRIPTORS: ACHING;SORE
DESCRIPTORS: ACHING

## 2023-12-15 ASSESSMENT — PAIN DESCRIPTION - LOCATION
LOCATION: ABDOMEN
LOCATION: ABDOMEN;OTHER (COMMENT)
LOCATION: ABDOMEN

## 2023-12-15 ASSESSMENT — PAIN DESCRIPTION - PAIN TYPE: TYPE: SURGICAL PAIN

## 2023-12-15 ASSESSMENT — PAIN SCALES - GENERAL
PAINLEVEL_OUTOF10: 0
PAINLEVEL_OUTOF10: 0
PAINLEVEL_OUTOF10: 3
PAINLEVEL_OUTOF10: 0
PAINLEVEL_OUTOF10: 6
PAINLEVEL_OUTOF10: 5
PAINLEVEL_OUTOF10: 3
PAINLEVEL_OUTOF10: 5
PAINLEVEL_OUTOF10: 5
PAINLEVEL_OUTOF10: 0
PAINLEVEL_OUTOF10: 8

## 2023-12-15 ASSESSMENT — PAIN DESCRIPTION - FREQUENCY: FREQUENCY: CONTINUOUS

## 2023-12-15 ASSESSMENT — PAIN - FUNCTIONAL ASSESSMENT
PAIN_FUNCTIONAL_ASSESSMENT: ACTIVITIES ARE NOT PREVENTED

## 2023-12-15 ASSESSMENT — PAIN DESCRIPTION - ORIENTATION
ORIENTATION: MID;LEFT
ORIENTATION: LEFT;MID
ORIENTATION: LEFT;MID
ORIENTATION: MID;LEFT
ORIENTATION: LEFT;MID

## 2023-12-15 ASSESSMENT — PAIN DESCRIPTION - ONSET: ONSET: ON-GOING

## 2023-12-15 NOTE — DISCHARGE INSTR - COC
Continuity of Care Form    Patient Name: Bianca Mcbride   :  1965  MRN:  [id number]    Admit date:  2023  Discharge date:  ***    Code Status Order: Full Code   Advance Directives:     Admitting Physician:  Michael Townsend DO  PCP: Lawyer Nick MD    Discharging Nurse: [facility] Unit/Room#: [id number]/1532-66  Discharging Unit Phone Number: ***    Emergency Contact:   Extended Emergency Contact Information  Primary Emergency Contact: Greg Oh  Address: [address] [place] [address], [facility] [place] [address] Roh[address]ley Phone: [phone]  Relation: Brother/Sister  Secondary Emergency Contact: [address] [place] [address], [facility] Phone: [phone]  Relation: Other    Past Surgical History:  Past Surgical History:   Procedure Laterality Date    ABDOMINAL AORTIC ANEURYSM REPAIR Left 2023    OPEN ABDOMINAL AORTIC ANEURYSM REPAIR,  RETROPERONEAL APPROACH performed by Og Chang MD at [address] N/A 2023    BRONCHOSCOPY DIAGNOSTIC OR CELL [address] [facility] [address] performed by Woody Price MD at [address] [facility]  2014    HERNIA REPAIR      BILAT GROIN    INGUINAL HERNIA REPAIR  12    REPAIR RECURRENT WITH MESH    UMBILICAL HERNIA REPAIR  3/7/14    with mesh    UPPER GASTROINTESTINAL ENDOSCOPY         Immunization History:   Immunization History   Administered Date(s) Administered    COVID-19, PFIZER PURPLE top, DILUTE for use, (age 15 y+), 30mcg/0.3mL 2021    COVID-19, PFIZER, ( formula), (age 12y+), IM, 30mcg/0.3mL 10/17/2023    Pneumococcal Conjugate 7-valent (Kristi Pap) 10/06/2012       Active Problems:  Patient Active Problem List   Diagnosis Code    DDD (degenerative disc disease), cervical M50.30    Displacement of cervical intervertebral disc without myelopathy M50.20    Aneurysm of infrarenal abdominal aorta (HCC) I71.43    Anxiety disorder F41.9    Benign essential hypertension I10    Benign prostatic

## 2023-12-15 NOTE — PROGRESS NOTES
[address] [facility] Department   Phone: [phone]    Physical Therapy    [] Initial Evaluation            [x] Daily Treatment Note         [] Discharge Summary      Patient: Tresa Sue   : 1965   MRN: [id number]   Date of Service:  12/15/2023  Admitting Diagnosis: Debility  Current Admission Summary: Pauline Chu is a 59year old male with a past medical history significant for CAD, COPD, HTN, HLD, neuropathy, and tobacco use who presented to [facility] on 23 for planned open AAA repair and left renal artery implantation. Past Medical History:  has a past medical history of Abdominal aortic aneurysm (AAA) ([address]), Anxiety, Arthritis, Bulging disc, CAD (coronary artery disease), COPD (chronic obstructive pulmonary disease) ([address]), Dependent personality disorder ([address]), Diverticulosis, Enlarged prostate, History of percutaneous left heart catheterization, Hyperlipidemia, Hypertension, Leukocytosis, MDD (major depressive disorder), Neuropathy, Tobacco abuse, and Weight loss. Past Surgical History:  has a past surgical history that includes hernia repair; Colonoscopy; Upper gastrointestinal endoscopy; Inguinal hernia repair (12); Umbilical hernia repair (3/7/14); Cystocopy (2014); Abdominal aortic aneurysm repair (Left, 2023); and bronchoscopy (N/A, 2023).   Discharge Recommendations: outpatient PT  DME Required For Discharge: DME to be determined pending patient progress  Precautions/Restrictions: medium fall risk  Weight Bearing Restrictions: weight bearing as tolerated  [] Right Upper Extremity  [] Left Upper Extremity [] Right Lower Extremity  [] Left Lower Extremity     Required Braces/Orthotics: no braces required   [] Right  [] Left  Positional Restrictions:no positional restrictions    Pre-Admission Information   Lives With: family, sister- plans to live with her for at least the next 6 months and then would like to find his own place

## 2023-12-15 NOTE — PROGRESS NOTES
provided with 7 2. Read medication bottle 3. Sort medication into correct location--SUNDAY only d/t time constraints). Pt required mod verbal cues for following direction and sequencing the task. Pt sorted 3 medications per medication label with 50% accuracy with min verbal cues. Pt able to open medication bottles with no assistance however pt spilled simulated pill bottle 1x dropping them on the table or ground. Pt demonstrated deficits in attention, problem solving, and recognizing errors made. Recommending trialing medication management again d/t pt reporting increased drowsiness following poor sleep/end of day. Pt may benefit from SUP w/ medication management upon d/c home to ensure safety. Bed mobility w/ mod I using flat bed. Pt left in bed, bed alarm activated, call light w/in reach, and all needs met. Cognition  Attention Span: attends with cues to redirect, difficulty attending to directions, difficulty dividing attention  Orientation:    alert and oriented x 4  Command Following:   [facility]     Education  Barriers To Learning: none  Patient Education: patient educated on goals, OT role and benefits, plan of care, proper use of assistive device/equipment, adaptive device training, energy conservation, transfer training, discharge recommendations  Learning Assessment:  patient verbalizes and demonstrates understanding    Assessment  Activity Tolerance: well   Impairments Requiring Therapeutic Intervention: decreased functional mobility, decreased ADL status, decreased ROM, decreased strength, decreased endurance, decreased balance, decreased IADL, increased pain  Prognosis: good  Clinical Assessment: Pt has progress to SBA for all mobility w/o device and SBA for moderate level novel balance task this date. Pt is highly motivated to return to [facility].  Decreased gross coordination noted during tennis activity this date d/t slowed reaction time but increased activity tolerance during task w/ only 2 PM

## 2023-12-15 NOTE — PROGRESS NOTES
Ambulated to bathroom using walker and standby assistance. After voiding, with approximately 5 feet left to reach the bed, patient wanted to try walking without an assistive device. Steady on feet through transfer. 2115  Assessment complete. Alert, oriented x4. Reports pain along surgical incision, extending from mid to left abdomen. Given PRN oxycodone 5 mg. Given PRN clonazepam per patient request. Patient hoping to get good rest tonight. The care plan and education has been reviewed and mutually agreed upon with the patient. In bed, alarm on, bed in lowest position, call light and table within reach. No further needs expressed at this time. 0130  Patient appears to be sleeping more soundly this evening compared to this RN's previous shift. 0340  Up to chair. Alarm engaged. Given PRN oxycodone 5 mg to aid in abdominal and low back pain control.

## 2023-12-16 ENCOUNTER — APPOINTMENT (OUTPATIENT)
Dept: CT IMAGING | Age: 58
DRG: 862 | End: 2023-12-16
Attending: PHYSICAL MEDICINE & REHABILITATION
Payer: COMMERCIAL

## 2023-12-16 LAB
BILIRUB UR QL STRIP.AUTO: NEGATIVE
CLARITY UR: CLEAR
COLOR UR: ABNORMAL
EPI CELLS #/AREA URNS HPF: ABNORMAL /HPF (ref 0–5)
GLUCOSE UR STRIP.AUTO-MCNC: NEGATIVE MG/DL
HGB UR QL STRIP.AUTO: ABNORMAL
KETONES UR STRIP.AUTO-MCNC: NEGATIVE MG/DL
LEUKOCYTE ESTERASE UR QL STRIP.AUTO: NEGATIVE
NITRITE UR QL STRIP.AUTO: NEGATIVE
PH UR STRIP.AUTO: 6.5 [PH] (ref 5–8)
PROT UR STRIP.AUTO-MCNC: NEGATIVE MG/DL
RBC #/AREA URNS HPF: ABNORMAL /HPF (ref 0–4)
SP GR UR STRIP.AUTO: 1.01 (ref 1–1.03)
UA COMPLETE W REFLEX CULTURE PNL UR: YES
UA DIPSTICK W REFLEX MICRO PNL UR: YES
URN SPEC COLLECT METH UR: ABNORMAL
UROBILINOGEN UR STRIP-ACNC: 1 E.U./DL
WBC #/AREA URNS HPF: ABNORMAL /HPF (ref 0–5)

## 2023-12-16 PROCEDURE — 81001 URINALYSIS AUTO W/SCOPE: CPT

## 2023-12-16 PROCEDURE — 97116 GAIT TRAINING THERAPY: CPT

## 2023-12-16 PROCEDURE — 94640 AIRWAY INHALATION TREATMENT: CPT

## 2023-12-16 PROCEDURE — 97110 THERAPEUTIC EXERCISES: CPT

## 2023-12-16 PROCEDURE — 97535 SELF CARE MNGMENT TRAINING: CPT

## 2023-12-16 PROCEDURE — 97530 THERAPEUTIC ACTIVITIES: CPT

## 2023-12-16 PROCEDURE — 74176 CT ABD & PELVIS W/O CONTRAST: CPT

## 2023-12-16 PROCEDURE — 6370000000 HC RX 637 (ALT 250 FOR IP): Performed by: STUDENT IN AN ORGANIZED HEALTH CARE EDUCATION/TRAINING PROGRAM

## 2023-12-16 PROCEDURE — 94761 N-INVAS EAR/PLS OXIMETRY MLT: CPT

## 2023-12-16 PROCEDURE — 6370000000 HC RX 637 (ALT 250 FOR IP): Performed by: PHYSICAL MEDICINE & REHABILITATION

## 2023-12-16 PROCEDURE — 6360000002 HC RX W HCPCS: Performed by: PHYSICAL MEDICINE & REHABILITATION

## 2023-12-16 PROCEDURE — 1280000000 HC REHAB R&B

## 2023-12-16 RX ORDER — OXYCODONE HYDROCHLORIDE 5 MG/1
10 TABLET ORAL EVERY 4 HOURS PRN
Status: DISPENSED | OUTPATIENT
Start: 2023-12-16

## 2023-12-16 RX ADMIN — LEVALBUTEROL 0.63 MG: 1.25 SOLUTION, CONCENTRATE RESPIRATORY (INHALATION) at 14:10

## 2023-12-16 RX ADMIN — ENOXAPARIN SODIUM 40 MG: 100 INJECTION SUBCUTANEOUS at 08:40

## 2023-12-16 RX ADMIN — OXYCODONE 5 MG: 5 TABLET ORAL at 14:35

## 2023-12-16 RX ADMIN — LEVALBUTEROL 0.63 MG: 1.25 SOLUTION, CONCENTRATE RESPIRATORY (INHALATION) at 20:02

## 2023-12-16 RX ADMIN — OXYCODONE 5 MG: 5 TABLET ORAL at 10:21

## 2023-12-16 RX ADMIN — ATORVASTATIN CALCIUM 40 MG: 40 TABLET, FILM COATED ORAL at 19:46

## 2023-12-16 RX ADMIN — CARVEDILOL 3.12 MG: 3.12 TABLET, FILM COATED ORAL at 19:46

## 2023-12-16 RX ADMIN — CLONAZEPAM 1 MG: 0.5 TABLET ORAL at 19:46

## 2023-12-16 RX ADMIN — LEVALBUTEROL 0.63 MG: 1.25 SOLUTION, CONCENTRATE RESPIRATORY (INHALATION) at 09:32

## 2023-12-16 RX ADMIN — CLONAZEPAM 1 MG: 0.5 TABLET ORAL at 10:21

## 2023-12-16 RX ADMIN — PANTOPRAZOLE SODIUM 40 MG: 40 TABLET, DELAYED RELEASE ORAL at 05:43

## 2023-12-16 RX ADMIN — OXYCODONE 5 MG: 5 TABLET ORAL at 05:42

## 2023-12-16 RX ADMIN — ASPIRIN 81 MG: 81 TABLET, COATED ORAL at 08:40

## 2023-12-16 RX ADMIN — OXYCODONE 10 MG: 5 TABLET ORAL at 18:20

## 2023-12-16 RX ADMIN — CARVEDILOL 3.12 MG: 3.12 TABLET, FILM COATED ORAL at 08:40

## 2023-12-16 RX ADMIN — CLOPIDOGREL BISULFATE 75 MG: 75 TABLET ORAL at 08:40

## 2023-12-16 RX ADMIN — OXYCODONE 10 MG: 5 TABLET ORAL at 23:43

## 2023-12-16 RX ADMIN — MELATONIN TAB 3 MG 6 MG: 3 TAB at 19:46

## 2023-12-16 ASSESSMENT — PAIN DESCRIPTION - LOCATION
LOCATION: ABDOMEN
LOCATION: ABDOMEN;BACK
LOCATION: ABDOMEN;BACK
LOCATION: BACK
LOCATION: BACK;ABDOMEN
LOCATION: ABDOMEN;BACK

## 2023-12-16 ASSESSMENT — PAIN SCALES - GENERAL
PAINLEVEL_OUTOF10: 8
PAINLEVEL_OUTOF10: 3
PAINLEVEL_OUTOF10: 5
PAINLEVEL_OUTOF10: 3
PAINLEVEL_OUTOF10: 8
PAINLEVEL_OUTOF10: 6
PAINLEVEL_OUTOF10: 8
PAINLEVEL_OUTOF10: 7
PAINLEVEL_OUTOF10: 5

## 2023-12-16 ASSESSMENT — PAIN DESCRIPTION - ORIENTATION
ORIENTATION: MID;LEFT
ORIENTATION: MID;LEFT
ORIENTATION: LEFT;MID
ORIENTATION: MID;LEFT
ORIENTATION: MID;LEFT
ORIENTATION: MID

## 2023-12-16 ASSESSMENT — PAIN DESCRIPTION - DESCRIPTORS
DESCRIPTORS: ACHING

## 2023-12-16 ASSESSMENT — PAIN DESCRIPTION - FREQUENCY: FREQUENCY: CONTINUOUS

## 2023-12-16 ASSESSMENT — PAIN - FUNCTIONAL ASSESSMENT
PAIN_FUNCTIONAL_ASSESSMENT: ACTIVITIES ARE NOT PREVENTED

## 2023-12-16 ASSESSMENT — PAIN DESCRIPTION - ONSET: ONSET: ON-GOING

## 2023-12-16 ASSESSMENT — PAIN DESCRIPTION - PAIN TYPE: TYPE: SURGICAL PAIN

## 2023-12-16 NOTE — PROGRESS NOTES
[address] [facility] Department   Phone: [phone]    Occupational Therapy    [] Initial Evaluation            [x] Daily Treatment Note         [] Discharge Summary      Patient: Umesh Salazar   : 1965   MRN: [id number]   Date of Service:  2023    Admitting Diagnosis:  Debility  Current Admission Summary: The patient is a 59 y.o.  male with known AAA and has been followed with CTA. A recent CTA showed an increased in size of AAA and abutting the renal arteries. Posterior to the renal artery, there was an outpouching of the aorta. It was felt the safest way to completely repair the aorta and have a sound proximal anastomosis would be through a lateral retroperitoneal approach. Past Medical History:  has a past medical history of Abdominal aortic aneurysm (AAA) ([address]), Anxiety, Arthritis, Bulging disc, CAD (coronary artery disease), COPD (chronic obstructive pulmonary disease) ([address]), Dependent personality disorder ([address]), Diverticulosis, Enlarged prostate, History of percutaneous left heart catheterization, Hyperlipidemia, Hypertension, Leukocytosis, MDD (major depressive disorder), Neuropathy, Tobacco abuse, and Weight loss. Past Surgical History:  has a past surgical history that includes hernia repair; Colonoscopy; Upper gastrointestinal endoscopy; Inguinal hernia repair (12); Umbilical hernia repair (3/7/14); Cystocopy (2014); Abdominal aortic aneurysm repair (Left, 2023); and bronchoscopy (N/A, 2023).     Discharge Recommendations: No continued OT    DME Required For Discharge: DME to be determined pending patient progress    Precautions/Restrictions: medium fall risk  Weight Bearing Restrictions: no restrictions      Required Braces/Orthotics: no braces required     Positional Restrictions:no positional restrictions    Pre-Admission Information   Lives With: family, staying with sister for now     Type of Home: house  Home Layout:

## 2023-12-16 NOTE — PROGRESS NOTES
Requested use of a walker as early in morning and reports has some dizziness. Ambulation Trial 2  Surface:level surface  Assistive Device: no device  Assistance: supervision  Distance: 145' and short distances in the gym between activities  Gait Mechanics: decreased gait speed, decreased step height,  guarded gait, and limited arm swing  Comments:     Stair Mobility:  Number of Steps: 12  Step Height: 6 inch  Hand Rails: (B) handrail  Device: no device  Assistance: contact guard assistance  Comments: Step over step pattern  Wheelchair Mobility:  No w/c mobility completed on this date. Comments:  Balance:  Static Sitting Balance: good: independent with functional balance in unsupported position  Dynamic Sitting Balance: good: independent with functional balance in unsupported position  Static Standing Balance: fair (+): maintains balance at SBA/supervision without use of UE support  Dynamic Standing Balance: fair: maintains balance at CGA without use of UE support  Comments:    Other Therapeutic Interventions  First session: Performed functional mobility as noted above. - Performed seated stepper x5 min. - Step over half foam roll alternating 1 feet x 15 and then B feet x 15  - Step on airex pad completed: mini marches x 15, eyes closed x 30 sec and pertubations all with Close Superivison  - Standing exercises close to the ballet bar but did not need UE assist:  marching x 15, hip abd/add x 15, heel raises x 15 and hip extension x 15. - Attempted dual task intermittently with above but Pt. Unable to focus to answer questions     Second session: Pt. Is sitting in the BS chair, agreeable to therapy, reports having small amount of blood in urine, RN aware and testing. Sit to/from stand with Mod I, ambulated ~400' no device with Supervision with encouragement for increased speed and improved arm swing/fluidity. Completed Up and down 12 steps with reciprocal pattern and increased speed with Supervision. training    Goals  Patient Goals: Return to taking walks with dogs and grandchildren   Short Term Goals:  Time Frame: 7-10 days  Patient will complete bed mobility at [facility]   Patient will complete transfers at [facility]   Patient will ambulate 150 ft with use of LRAD at [facility]  Patient will ascend/descend 12 stairs with (B) handrail at modified independent  Patient will complete car transfer at modified independent    Above goals reviewed on 12/16/2023. All goals are ongoing at this time unless indicated above. Therapy Session Time      Individual Group Co-treatment   Time In 0730       Time Out 0815       Minutes 39         Second Session Therapy Time:   Individual Concurrent Group Co-treatment   Time In  1100         Time Out  1145         Minutes  45               Timed Code Treatment Minutes:  92+45    Total Treatment Minutes:  90         Electronically Signed By: Alfredo Harrison, PT, [id number]  12/18/2023:  2nd session time added into grid. Treating therapist provided all objective documentation.   Christy Hearn DPT [id number]

## 2023-12-16 NOTE — PROGRESS NOTES
Blood noted in urine, patient with c/o pain in area of the kidney, urine specimen sent, awaiting results.

## 2023-12-17 VITALS
HEIGHT: 69 IN | WEIGHT: 175.49 LBS | RESPIRATION RATE: 16 BRPM | SYSTOLIC BLOOD PRESSURE: 145 MMHG | HEART RATE: 72 BPM | DIASTOLIC BLOOD PRESSURE: 67 MMHG | BODY MASS INDEX: 25.99 KG/M2 | OXYGEN SATURATION: 93 % | TEMPERATURE: 97.7 F

## 2023-12-17 LAB
BACTERIA UR CULT: NORMAL
BASOPHILS # BLD: 0.1 K/UL (ref 0–0.2)
BASOPHILS NFR BLD: 0.7 %
DEPRECATED RDW RBC AUTO: 12.8 % (ref 12.4–15.4)
EOSINOPHIL # BLD: 0.2 K/UL (ref 0–0.6)
EOSINOPHIL NFR BLD: 2.1 %
HCT VFR BLD AUTO: 32.9 % (ref 40.5–52.5)
HGB BLD-MCNC: 11.2 G/DL (ref 13.5–17.5)
LYMPHOCYTES # BLD: 1.2 K/UL (ref 1–5.1)
LYMPHOCYTES NFR BLD: 12.2 %
MCH RBC QN AUTO: 33.3 PG (ref 26–34)
MCHC RBC AUTO-ENTMCNC: 34.1 G/DL (ref 31–36)
MCV RBC AUTO: 97.6 FL (ref 80–100)
MONOCYTES # BLD: 0.8 K/UL (ref 0–1.3)
MONOCYTES NFR BLD: 8.6 %
NEUTROPHILS # BLD: 7.5 K/UL (ref 1.7–7.7)
NEUTROPHILS NFR BLD: 76.4 %
PLATELET # BLD AUTO: 671 K/UL (ref 135–450)
PMV BLD AUTO: 6.6 FL (ref 5–10.5)
RBC # BLD AUTO: 3.37 M/UL (ref 4.2–5.9)
WBC # BLD AUTO: 9.8 K/UL (ref 4–11)

## 2023-12-17 PROCEDURE — 94761 N-INVAS EAR/PLS OXIMETRY MLT: CPT

## 2023-12-17 PROCEDURE — 6360000002 HC RX W HCPCS: Performed by: PHYSICAL MEDICINE & REHABILITATION

## 2023-12-17 PROCEDURE — 6370000000 HC RX 637 (ALT 250 FOR IP): Performed by: PHYSICAL MEDICINE & REHABILITATION

## 2023-12-17 PROCEDURE — 94640 AIRWAY INHALATION TREATMENT: CPT

## 2023-12-17 PROCEDURE — 6370000000 HC RX 637 (ALT 250 FOR IP): Performed by: STUDENT IN AN ORGANIZED HEALTH CARE EDUCATION/TRAINING PROGRAM

## 2023-12-17 PROCEDURE — 1280000000 HC REHAB R&B

## 2023-12-17 RX ORDER — LANOLIN ALCOHOL/MO/W.PET/CERES
6 CREAM (GRAM) TOPICAL NIGHTLY
Status: DISPENSED | OUTPATIENT
Start: 2023-12-17

## 2023-12-17 RX ADMIN — ATORVASTATIN CALCIUM 40 MG: 40 TABLET, FILM COATED ORAL at 19:55

## 2023-12-17 RX ADMIN — ENOXAPARIN SODIUM 40 MG: 100 INJECTION SUBCUTANEOUS at 08:28

## 2023-12-17 RX ADMIN — LEVALBUTEROL 0.63 MG: 1.25 SOLUTION, CONCENTRATE RESPIRATORY (INHALATION) at 23:02

## 2023-12-17 RX ADMIN — LEVALBUTEROL 0.63 MG: 1.25 SOLUTION, CONCENTRATE RESPIRATORY (INHALATION) at 13:36

## 2023-12-17 RX ADMIN — OXYCODONE 10 MG: 5 TABLET ORAL at 05:14

## 2023-12-17 RX ADMIN — ASPIRIN 81 MG: 81 TABLET, COATED ORAL at 08:28

## 2023-12-17 RX ADMIN — CARVEDILOL 3.12 MG: 3.12 TABLET, FILM COATED ORAL at 08:28

## 2023-12-17 RX ADMIN — CLONAZEPAM 1 MG: 0.5 TABLET ORAL at 19:58

## 2023-12-17 RX ADMIN — CLOPIDOGREL BISULFATE 75 MG: 75 TABLET ORAL at 08:28

## 2023-12-17 RX ADMIN — OXYCODONE 10 MG: 5 TABLET ORAL at 09:22

## 2023-12-17 RX ADMIN — LEVALBUTEROL 0.63 MG: 1.25 SOLUTION, CONCENTRATE RESPIRATORY (INHALATION) at 07:09

## 2023-12-17 RX ADMIN — MELATONIN TAB 3 MG 6 MG: 3 TAB at 19:55

## 2023-12-17 RX ADMIN — OXYCODONE 10 MG: 5 TABLET ORAL at 17:12

## 2023-12-17 RX ADMIN — OXYCODONE 10 MG: 5 TABLET ORAL at 21:18

## 2023-12-17 RX ADMIN — PANTOPRAZOLE SODIUM 40 MG: 40 TABLET, DELAYED RELEASE ORAL at 05:14

## 2023-12-17 RX ADMIN — OXYCODONE 10 MG: 5 TABLET ORAL at 13:11

## 2023-12-17 RX ADMIN — CARVEDILOL 3.12 MG: 3.12 TABLET, FILM COATED ORAL at 19:55

## 2023-12-17 ASSESSMENT — PAIN SCALES - GENERAL
PAINLEVEL_OUTOF10: 8
PAINLEVEL_OUTOF10: 0
PAINLEVEL_OUTOF10: 4
PAINLEVEL_OUTOF10: 4
PAINLEVEL_OUTOF10: 8

## 2023-12-17 ASSESSMENT — PAIN DESCRIPTION - LOCATION
LOCATION_2: BACK
LOCATION_2: BACK
LOCATION: BACK;ABDOMEN
LOCATION: ABDOMEN;BACK
LOCATION: ABDOMEN
LOCATION: ABDOMEN;BACK
LOCATION: ABDOMEN
LOCATION: ABDOMEN

## 2023-12-17 ASSESSMENT — PAIN DESCRIPTION - DESCRIPTORS
DESCRIPTORS: ACHING;THROBBING
DESCRIPTORS: ACHING;THROBBING
DESCRIPTORS_2: THROBBING
DESCRIPTORS: ACHING
DESCRIPTORS_2: THROBBING
DESCRIPTORS: SORE
DESCRIPTORS: SORE
DESCRIPTORS: ACHING;THROBBING

## 2023-12-17 ASSESSMENT — PAIN - FUNCTIONAL ASSESSMENT
PAIN_FUNCTIONAL_ASSESSMENT: PREVENTS OR INTERFERES SOME ACTIVE ACTIVITIES AND ADLS
PAIN_FUNCTIONAL_ASSESSMENT_SITE2: PREVENTS OR INTERFERES SOME ACTIVE ACTIVITIES AND ADLS
PAIN_FUNCTIONAL_ASSESSMENT: ACTIVITIES ARE NOT PREVENTED
PAIN_FUNCTIONAL_ASSESSMENT: PREVENTS OR INTERFERES SOME ACTIVE ACTIVITIES AND ADLS
PAIN_FUNCTIONAL_ASSESSMENT: ACTIVITIES ARE NOT PREVENTED
PAIN_FUNCTIONAL_ASSESSMENT_SITE2: PREVENTS OR INTERFERES SOME ACTIVE ACTIVITIES AND ADLS

## 2023-12-17 ASSESSMENT — PAIN DESCRIPTION - ORIENTATION
ORIENTATION: LEFT
ORIENTATION: LEFT;MID
ORIENTATION: MID;LEFT
ORIENTATION_2: LEFT;LOWER
ORIENTATION: MID;LEFT
ORIENTATION_2: LEFT;LOWER
ORIENTATION: LEFT;MID
ORIENTATION: LEFT;MID

## 2023-12-17 ASSESSMENT — PAIN DESCRIPTION - FREQUENCY
FREQUENCY: CONTINUOUS
FREQUENCY: CONTINUOUS

## 2023-12-17 ASSESSMENT — PAIN DESCRIPTION - ONSET
ONSET: ON-GOING
ONSET: ON-GOING

## 2023-12-17 ASSESSMENT — PAIN DESCRIPTION - PAIN TYPE
TYPE: SURGICAL PAIN
TYPE: SURGICAL PAIN

## 2023-12-17 ASSESSMENT — PAIN DESCRIPTION - INTENSITY
RATING_2: 8
RATING_2: 8

## 2023-12-17 ASSESSMENT — PAIN SCALES - WONG BAKER: WONGBAKER_NUMERICALRESPONSE: 0

## 2023-12-17 NOTE — PROGRESS NOTES
Patient refused to take Mucomyst. Storng, non-productive cough. BS clear and on room air.     Electronically signed by Belinda Carrera RCP on 12/17/2023 at 7:11 AM

## 2023-12-18 NOTE — PROGRESS NOTES
Assessment complete. Alert, oriented x4. Reports pain has not improved, but has not gotten worse since receiving oxycodone 10 mg at 1712 from day shift RN. Incisions to abdomen clean, dry, intact, and well-approximated. Given PRN clonazepam to aid in anxiety relief. Discussed plan with patient to consult Vascular Surgery in the morning as day shift RN was reportedly unable to reach anyone on call with this group today. The care plan and education has been reviewed and mutually agreed upon with the patient. In bed, alarm on, bed in lowest position, call light and table within reach. No further needs expressed at this time. 2120  Given PRN oxycodone 10 mg to aid in relief of mid to left abdominal and left lower back pain. Ambulated to bathroom without assistive device. Voided. Urine being strained with each occurrence. No stone noted. 2215  Resting in bed. Reports comfort. States his pain in \"very tolerable. \"    8987  Up to chair. Alarm engaged. Given PRN oxycodone 10 mg to aid in relief of pain. 0515  Reports pain tolerable at a 4 out of 10.

## 2023-12-18 NOTE — PROGRESS NOTES
Morning assessment completed, vss, alert and oriented, oxy and klonopin x 1, The care plan and education has been reviewed and mutually agreed upon with the patient. Pt remains free from falls. Fall precautions in place--bed in lowest position, call light within reach, bed alarm in use.  Pt aware to call for assistance before getting up.      0800: vascular consulted    1000: urology consulted

## 2023-12-18 NOTE — CARE COORDINATION
Discharge Planning:     (CM) called Financial Counselor, Charlie Levy, and informed that patient presented questions/concerns of [facility] bills (per Nurse). Juan Diego to discuss with patient.       Freddy DAMON, LESLYE, [facility] -   [phone]    Electronically signed by MARISOL Cha on 12/18/2023 at 1:25 PM

## 2023-12-18 NOTE — CARE COORDINATION
Discharge Planning:     (CM) called [facility] staff, Antwon Parker ([phone]), and informed of needed Shower Chair with a Back for discharge tomorrow. Antwon Parker confirmed that patient's insurance will cover this. CM PerfectServed patient's Physician, Dr. Simona Reyes and requested this Durable Medical Equipment be ordered.         Alvaro DAMON, [facility], [facility] -   [phone]    Electronically signed by MARISOL Collazo on 12/18/2023 at 1:15 PM

## 2023-12-18 NOTE — PROGRESS NOTES
[address] [facility] Department   Phone: [phone]    Occupational Therapy    [] Initial Evaluation            [x] Daily Treatment Note         [x] Discharge Summary      Patient: Garcia Reynoso   : 1965   MRN: [id number]   Date of Service:  2023    Admitting Diagnosis:  Debility  Current Admission Summary: The patient is a 59 y.o.  male with known AAA and has been followed with CTA. A recent CTA showed an increased in size of AAA and abutting the renal arteries. Posterior to the renal artery, there was an outpouching of the aorta. It was felt the safest way to completely repair the aorta and have a sound proximal anastomosis would be through a lateral retroperitoneal approach. Past Medical History:  has a past medical history of Abdominal aortic aneurysm (AAA) ([address]), Anxiety, Arthritis, Bulging disc, CAD (coronary artery disease), COPD (chronic obstructive pulmonary disease) ([address]), Dependent personality disorder ([address]), Diverticulosis, Enlarged prostate, History of percutaneous left heart catheterization, Hyperlipidemia, Hypertension, Leukocytosis, MDD (major depressive disorder), Neuropathy, Tobacco abuse, and Weight loss. Past Surgical History:  has a past surgical history that includes hernia repair; Colonoscopy; Upper gastrointestinal endoscopy; Inguinal hernia repair (12); Umbilical hernia repair (3/7/14); Cystocopy (2014); Abdominal aortic aneurysm repair (Left, 2023); and bronchoscopy (N/A, 2023). Discharge Recommendations: No continued OT    DME Required For Discharge:  shower chair with back, reacher; pt owns grab bars in shower and hand held shower head.      Precautions/Restrictions: medium fall risk  Weight Bearing Restrictions: no restrictions      Required Braces/Orthotics: no braces required     Positional Restrictions:no positional restrictions    Pre-Admission Information   Lives With: family, staying with sister

## 2023-12-18 NOTE — PROGRESS NOTES
independent  Floor <=> stand transfer: modified independent  Comments:  Ambulation  Surface:level surface  Assistive Device: no device  Assistance: Independent  Distance: 415'  Gait Mechanics: midline path, appropriate speed, able to dual task during ambulation with conversation  Comments:   Ambulation Trial 2  Surface:carpet  Assistive Device: no device  Assistance: Independent  Distance: 10'  Gait Mechanics: same as trial 1 with decreased arm swing  Comments:     Stair Mobility:  Number of Steps: 12  Step Height: 6 inch  Hand Rails: (B) handrail  Device: no device  Assistance: modified independent  Comments: Step over step pattern  Wheelchair Mobility:  No w/c mobility completed on this date. Comments:  Balance:  Static Sitting Balance: good: independent with functional balance in unsupported position  Dynamic Sitting Balance: good: independent with functional balance in unsupported position  Static Standing Balance: good: independent with functional balance in unsupported position  Dynamic Standing Balance: good: independent with functional balance in unsupported position  Patient completes object retrieval from floor in standing position at modified independent  Comments:    Other Therapeutic Interventions  First session: Performed forward/backward crawling on mat on floor mod I. Performed seated stepper X 8 minutes level 2. Pt remained in room in chair with alarm on and all needs in reach. Second session: Pt in chair on arrival.  Agreeable to therapy session. Pt ambulated same as trial 1 above . Performed lifting box from floor height to table height X 5 reps with vc and demonstration for appropriate lifting techniques. Performed wobble board in parallel bars with weight shifting R and L without UE support X 10 with facilitation for R weight shift.   Performed supine therapeutic exercises for core strengthening with small SB:  bridging X 10, trunk rotation X 10, hamstring curls X 10, bridging with

## 2023-12-18 NOTE — DISCHARGE INSTRUCTIONS
used: reacher, shower chair with back, hand held shower head, grab bars in shower     []  Independent ? [x]  Modified Independent ? []  Supervision                []  Minimal Assistance ? []  Moderate Assistance ? []  Maximal Assistance    Supervision for medication management recommended upon initial discharge to home to ensure accuracy and safety. Driving Restriction:      [x]  YES   [] NO : Patient should contact surgeon prior to returning to driving. Mobility:     Ambulation: Device ____none_________________ (free text)     []  Independent ? [x]  Modified Independent ? []  Supervision                []  Minimal Assistance ? []  Moderate Assistance ? []  Maximal Assistance     Stairs:  Device _____none________________ (free text)    Number of stairs: _12____________ (free text)    Handrails:    [] Right   [] Left      [x] Bilateral   []  Independent ? [x]  Modified Independent ? []  Supervision                []  Minimal Assistance ? []  Moderate Assistance ? []  Maximal Assistance         Current Diet Consistency:?       []  Regular   [] Soft and Bite-Sized  ? [] Minced and Moist     ?[]  Puree     Current Liquid Level:?         [] Thin Liquids     [] Mildly Thick (Nectar) ?     [] Moderately Thick (Honey)    [] Pudding Thick    [] Maryjean Stamp Water Protocol     Dietary Restrictions:?      []  General Diet   []  Tube Feed, w/ Diet   []  Tube Feed, NPO   []  Carb Control   []  Cardiac   []  Renal    []  Other:

## 2023-12-19 NOTE — CARE COORDINATION
SW/JOS ER spoke with patient regarding his discharge plan. Patient referral faxed to [facility]. Patient reports that [facility] will provide patient transportation to outpatient therapy visits. Patient received shower chair. Patient had no further social service needs.     Electronically signed by MARISOL Yin on 12/19/2023 at 10:17 AM

## 2023-12-19 NOTE — DISCHARGE SUMMARY
Physical Medicine & Rehabilitation  Discharge Summary     Patient Identification:  Rosina Skinner  : 1965  Admit date: 2023  Discharge date: 23  Attending provider: Bandar Lemon DO        Primary care provider: Jd Sanchez MD     Discharge Diagnoses:   Patient Active Problem List   Diagnosis    DDD (degenerative disc disease), cervical    Displacement of cervical intervertebral disc without myelopathy    Aneurysm of infrarenal abdominal aorta (HCC)    Anxiety disorder    Benign essential hypertension    Benign prostatic hyperplasia with lower urinary tract symptoms    Carotid artery stenosis    Chronic obstructive pulmonary disease ([address])    Current smoker    Degeneration of lumbar or lumbosacral intervertebral disc    Depression    Diverticular disease    Esophageal reflux    Male erectile disorder    Neck sprain    Nicotine dependence    Other and unspecified hyperlipidemia    Primary localized osteoarthrosis of shoulder region    Recurrent major depression-severe ([address])    Suicidal ideation    HLD (hyperlipidemia)    Fatigue    Dizziness    Abnormal stress test    SOB (shortness of breath)    Weight loss, abnormal    Coronary artery disease involving native coronary artery of native heart    Juxtarenal abdominal aortic aneurysm (AAA) without rupture ([address])    AAA (abdominal aortic aneurysm) without rupture (HCC)    Acute postoperative pulmonary insufficiency (HCC)    Acute respiratory failure with hypoxemia (HCC)    History of smoking    Alcohol use    Acquired elevated diaphragm    Atelectasis    Postoperative anemia    Overweight (BMI 25.0-29. 9)    Chest congestion    Ineffective airway clearance    Pneumonia of both lower lobes due to Haemophilus influenzae [facility])    Debility       Discharge Functional Status:      Physical therapy:  Supine to Sit: Independent  Sit to Supine: Independent      Sit to Stand: Independent  Chair/Bed to Chair Transfer: Independent  Car Transfer:

## 2023-12-19 NOTE — CONSULTS
[facility]  Consultation Note    Chief Complaint / Reason for Consultation  S/p open AAA repair with hematuria and hematoma post-op    History of Present Illness  Patient is a 59 y.o. 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 Luna Cordova at [facility]. He developed post-op hypoxemic respiratory failure and required bronchoscopy and cultures grew Haemophilus influenzae and was started Levaquin. He was discharge to [address] [place] [address] on 12/11/23. He has been doing well with therapy. He does complain of some incisional pain as well as some back/abdominal pain. He developed hematuria as well. He had CT of the abdomen and pelvis on 12/16 which showed a left iliopsoas hematoma and a non obstructing left renal stone. Urology was consulted and planning for OP follow up. We have been consulted for any further recommendations. Patient is planning to discharge today and follow up with Dr. Luna Cordova this Friday. Review of Systems  Denies fevers, chills, chest pain, shortness of breath, nausea, vomiting, hematemesis, diarrhea, constipation, melena, hematochezia, wt changes, vision problems, blindness, hearing problems, facial droop, slurred speech, extremity weakness, extremity numbness, dysuria. Past Medical History:   Diagnosis Date    Abdominal aortic aneurysm (AAA) ([facility])     Anxiety 11/01/2012    PSYCH DR Tash Uribe [id number], PANIC DISORDER    Arthritis     viv hands    Bulging disc     LUMBAR    CAD (coronary artery disease)     COPD (chronic obstructive pulmonary disease) (HCC)     EMPHYSEMA    Dependent personality disorder (HCC)     PER PSYCH.  HISTORY    Diverticulosis     Enlarged prostate     History of percutaneous left heart catheterization
vascular markings are normal.  Stable right pleural effusion and airspace change/atelectasis in the right lower lung zone. Left lung clear. No significant skeletal finding. Stable appearance of the chest with right pleural effusion and associated atelectasis. XR CHEST PORTABLE    Result Date: 12/7/2023  EXAMINATION: ONE XRAY VIEW OF THE CHEST 12/7/2023 10:03 am COMPARISON: Prior study at 5:46 a.m. HISTORY: ORDERING SYSTEM PROVIDED HISTORY: RLL collapse TECHNOLOGIST PROVIDED HISTORY: Reason for exam:->RLL collapse Reason for Exam: rll collapse FINDINGS: Stable central line. The heart is enlarged. Mediastinum and pulmonary vascular markings normal.  Stable appearance of right pleural effusion with elevation of the hemidiaphragm and atelectasis in the lower lobe. Left lung remains clear. No skeletal finding. Stable atelectasis and pleural effusion in the lower right chest.     XR CHEST PORTABLE    Result Date: 12/7/2023  EXAMINATION: ONE XRAY VIEW OF THE CHEST 12/7/2023 5:59 am COMPARISON: 12/06/2023 radiograph HISTORY: ORDERING SYSTEM PROVIDED HISTORY: tubes/lines TECHNOLOGIST PROVIDED HISTORY: Reason for exam:->tubes/lines Reason for Exam: tubes/lines FINDINGS: A right IJ central line stable. The heart is enlarged. Mediastinum and pulmonary vascular markings are normal.  Stable volume of a right pleural effusion, small to moderate in volume. There is associated elevation of the right hemidiaphragm and atelectasis of the right lower lobe. The left lung is well expanded and clear. There are no significant skeletal findings. Stable right pleural effusion with atelectasis in the right lower lobe.      XR CHEST PORTABLE    Result Date: 12/6/2023  EXAMINATION: ONE XRAY VIEW OF THE CHEST 12/6/2023 4:58 am COMPARISON: December 5, 2023 HISTORY: ORDERING SYSTEM PROVIDED HISTORY: tubes/lines TECHNOLOGIST PROVIDED HISTORY: Reason for exam:->tubes/lines Reason for Exam: Aneurysm of infrarenal abdominal

## 2023-12-19 NOTE — PROGRESS NOTES
[facility] Discharge Assessment    Transportation  \"Has lack of transportation kept you from medical appointments, meetings, work, or from getting things needed for daily living? \"Check all that apply:  [] A.  Yes, it has kept me from medical appointments or from getting my medications  [] B.  Yes, it has kept me from non-medical meetings, appointments, work, or from getting things that I need  [x] C.  No  [] X. Patient unable to respond  [] Y. Patient declines to respond    Provision of Current Reconciled Medication List to Subsequent Provider at Discharge  [] No, current reconciled medication list not provided to the subsequent provider. [x] Yes, current reconciled medication list provided to the subsequent provider. (**Select route of transmission below**)   [x] Via Electronic Health Record   [] [facility]  [] Verbal (e.g. in person, telephone, video conferencing)  [] Paper-based (e.g. fax, copies, printouts)   [] Other Methods (e.g. texting, email, CDs)    Provision of Current Reconciled Medication List to Patient at Discharge  [] No, current reconciled medication list not provided to the patient, family and/or caregiver. [x] Yes, current reconciled medication list provided to the patient, family and/or caregiver.  (**Select route of transmission below**)   [] Via Electronic Health Record (e.g., electronic access to patient portal)   [] Via [email or web address]  [x] Verbal (e.g. in person, telephone, video conferencing)  [x] Paper-based (e.g. fax, copies, printouts)   [] Other Methods (e.g. texting, email, CDs)    Health Literacy  \"How often do you need to have someone help you when you read instructions, pamphlets, or other written material from your doctor or pharmacy? \"  [x]  0. Never  []  1. Rarely  []  2. Sometimes  []  3. Often  []  4. Always  []  7. Patient declines to respond  []  8.   Patient unable to respond    BIMS - **Must be completed in the

## 2023-12-19 NOTE — PROGRESS NOTES
Assessment complete. Alert, oriented x4. Reports pain to left/mid abdomen and left/lower back. Patient wanting to take PRN oxycodone 10 mg for pain control, as well as PRN clonazepam 1 mg to relieve anxiety. Carvedilol 2.125 mg due. /67, HR 92. Discussed with patient worsened hypotension as a potential side effect of oxycodone, clonazepam, and carvedilol. Patient wanting to prioritize taking oxycodone for pain relief; given per [facility]. Approved by therapy to be independent in room. Patient verbalized understanding to call and wait for staff assistance if ever needed, especially if experiencing symptoms of hypotension (e.g., dizziness). Patient reports he has not passed stool since 12/15. Bowel sounds active in all quadrants. Abdomen soft. Requesting PRN medication for constipation relief. Given PRN senna. The care plan and education has been reviewed and mutually agreed upon with the patient. In bed, alarm on, bed in lowest position, call light and table within reach. No further needs expressed at this time. 2225  Discussed with Dr. Mary Jacob. Sherri Hilliardr patient's BP, HR, carvedilol order, oxycodone administration, and patient wanting to take clonazepam. Per Dr. Deedee Cardoza, administer carvedilol, and encourage patient to drink fluids. 2230  /73, HR 87. Given carvedilol. Given PRN clonazepam for anxiety relief. Educated patient on purpose of drinking fluids and eating snack to prevent hypotension. Water pitcher refilled. Provided snack. 0505  Given PRN oxycodone 10 mg for pain relief. Up in chair. 0815  PRN Glycolax given per patient request to relieve constipation.

## 2023-12-19 NOTE — PLAN OF CARE
Problem: Discharge Planning  Goal: Discharge to home or other facility with appropriate resources  12/12/2023 1100 by Tung Andrade RN  Outcome: Progressing  Flowsheets (Taken 12/12/2023 0251 by Zaira Jaramillo RN)  Discharge to home or other facility with appropriate resources: Identify barriers to discharge with patient and caregiver     Problem: Safety - Adult  Goal: Free from fall injury  12/12/2023 1100 by Tung Andrade RN  Outcome: Progressing
Problem: Discharge Planning  Goal: Discharge to home or other facility with appropriate resources  12/13/2023 0044 by Edvin Perez RN  Outcome: Progressing     Problem: Safety - Adult  Goal: Free from fall injury  12/13/2023 0044 by Edvin Perez RN  Outcome: Progressing     Problem: Pain  Goal: Verbalizes/displays adequate comfort level or baseline comfort level  12/13/2023 0044 by Edvin Perez RN  Outcome: Progressing
Problem: Discharge Planning  Goal: Discharge to home or other facility with appropriate resources  12/13/2023 0942 by Lorel Apley, RN  Outcome: Progressing     Problem: Safety - Adult  Goal: Free from fall injury  12/13/2023 0942 by Lorel Apley, RN  Outcome: Progressing
Problem: Discharge Planning  Goal: Discharge to home or other facility with appropriate resources  12/15/2023 1221 by Mehdi Nixon RN  Outcome: Progressing  Flowsheets (Taken 12/15/2023 1022)  Discharge to home or other facility with appropriate resources: Identify barriers to discharge with patient and caregiver  12/15/2023 0246 by Esteban Peterson RN  Outcome: Progressing  Flowsheets (Taken 12/14/2023 2132)  Discharge to home or other facility with appropriate resources: Identify discharge learning needs (meds, wound care, etc)     Problem: Safety - Adult  Goal: Free from fall injury  12/15/2023 1221 by Mehdi Nixon RN  Outcome: Fartun Dec (Taken 12/15/2023 1220)  Free From Fall Injury: Instruct family/caregiver on patient safety  12/15/2023 0246 by Esteban Peterson RN  Outcome: Progressing     Problem: Pain  Goal: Verbalizes/displays adequate comfort level or baseline comfort level  12/15/2023 1221 by Mehdi Nixon RN  Outcome: Progressing  Flowsheets (Taken 12/15/2023 1022)  Verbalizes/displays adequate comfort level or baseline comfort level: Encourage patient to monitor pain and request assistance  12/15/2023 0246 by Esteban Peterson RN  Outcome: Progressing  Flowsheets (Taken 12/14/2023 2053)  Verbalizes/displays adequate comfort level or baseline comfort level: Encourage patient to monitor pain and request assistance     Problem: ABCDS Injury Assessment  Goal: Absence of physical injury  12/15/2023 1221 by Mehdi Nixon RN  Outcome: Progressing  Flowsheets (Taken 12/15/2023 1220)  Absence of Physical Injury: Implement safety measures based on patient assessment  12/15/2023 0246 by Esteban Peterson RN  Outcome: Progressing  Flowsheets (Taken 12/15/2023 0245)  Absence of Physical Injury: Implement safety measures based on patient assessment
Problem: Discharge Planning  Goal: Discharge to home or other facility with appropriate resources  12/18/2023 1046 by Henry Galarza RN  Outcome: Progressing     Problem: Safety - Adult  Goal: Free from fall injury  12/18/2023 1046 by Herny Galarza RN  Outcome: Progressing     Problem: Pain  Goal: Verbalizes/displays adequate comfort level or baseline comfort level  12/18/2023 1046 by Henry Galarza RN  Outcome: Progressing
Problem: Discharge Planning  Goal: Discharge to home or other facility with appropriate resources  Outcome: Progressing     Problem: Safety - Adult  Goal: Free from fall injury  Outcome: Progressing
Problem: Discharge Planning  Goal: Discharge to home or other facility with appropriate resources  Outcome: Progressing  Flowsheets (Taken 12/13/2023 2117 by Katina Monsivais RN)  Discharge to home or other facility with appropriate resources: Identify discharge learning needs (meds, wound care, etc)     Problem: Safety - Adult  Goal: Free from fall injury  12/14/2023 1101 by Luna Way RN  Outcome: Progressing     Problem: Pain  Goal: Verbalizes/displays adequate comfort level or baseline comfort level  12/14/2023 1101 by Luna Way RN  Outcome: Progressing
Problem: Discharge Planning  Goal: Discharge to home or other facility with appropriate resources  Outcome: Progressing  Flowsheets (Taken 12/14/2023 2132)  Discharge to home or other facility with appropriate resources: Identify discharge learning needs (meds, wound care, etc)     Problem: Safety - Adult  Goal: Free from fall injury  Outcome: Progressing     Problem: Pain  Goal: Verbalizes/displays adequate comfort level or baseline comfort level  Outcome: Progressing  Flowsheets (Taken 12/14/2023 2053)  Verbalizes/displays adequate comfort level or baseline comfort level: Encourage patient to monitor pain and request assistance     Problem: ABCDS Injury Assessment  Goal: Absence of physical injury  Outcome: Progressing  Flowsheets (Taken 12/15/2023 0245)  Absence of Physical Injury: Implement safety measures based on patient assessment
Problem: Discharge Planning  Goal: Discharge to home or other facility with appropriate resources  Outcome: Progressing  Flowsheets (Taken 12/17/2023 2007)  Discharge to home or other facility with appropriate resources: Identify discharge learning needs (meds, wound care, etc)     Problem: Safety - Adult  Goal: Free from fall injury  Outcome: Progressing     Problem: Pain  Goal: Verbalizes/displays adequate comfort level or baseline comfort level  Outcome: Progressing  Flowsheets (Taken 12/17/2023 1951)  Verbalizes/displays adequate comfort level or baseline comfort level: Encourage patient to monitor pain and request assistance     Problem: ABCDS Injury Assessment  Goal: Absence of physical injury  Outcome: Progressing  Flowsheets (Taken 12/18/2023 0307)  Absence of Physical Injury: Implement safety measures based on patient assessment
Problem: Discharge Planning  Goal: Discharge to home or other facility with appropriate resources  Outcome: Progressing  Flowsheets (Taken 12/18/2023 2155)  Discharge to home or other facility with appropriate resources: Identify discharge learning needs (meds, wound care, etc)     Problem: Safety - Adult  Goal: Free from fall injury  Outcome: Progressing     Problem: Pain  Goal: Verbalizes/displays adequate comfort level or baseline comfort level  Outcome: Progressing  Flowsheets (Taken 12/18/2023 2155)  Verbalizes/displays adequate comfort level or baseline comfort level: Encourage patient to monitor pain and request assistance     Problem: ABCDS Injury Assessment  Goal: Absence of physical injury  Outcome: Progressing  Flowsheets (Taken 12/19/2023 0203)  Absence of Physical Injury: Implement safety measures based on patient assessment
Problem: Safety - Adult  Goal: Free from fall injury  12/15/2023 2322 by Chema Henry RN  Outcome: Progressing     Problem: Pain  Goal: Verbalizes/displays adequate comfort level or baseline comfort level  12/15/2023 2322 by Chema Henry RN  Outcome: Progressing
Problem: Safety - Adult  Goal: Free from fall injury  12/16/2023 1105 by Andrew Polanco RN  Outcome: Progressing  Note: Pt remains free from falls. Safety precautions in place. Bed in lowest position, bed/chair wheels locked, call light with in reach, bedside table in reach, bed/chair alarm on, fall risk wrist band on.
Problem: Safety - Adult  Goal: Free from fall injury  12/16/2023 2233 by Lavinia Lobato RN  Outcome: Progressing     Problem: Pain  Goal: Verbalizes/displays adequate comfort level or baseline comfort level  Outcome: Progressing     Problem: ABCDS Injury Assessment  Goal: Absence of physical injury  Outcome: Progressing
Problem: Safety - Adult  Goal: Free from fall injury  12/17/2023 0946 by Riley Blackburn RN  Outcome: Progressing  Note: Pt remains free from falls. Safety precautions in place. Bed in lowest position, bed/chair wheels locked, call light with in reach, bedside table in reach, bed/chair alarm on, fall risk wrist band on.
Problem: Safety - Adult  Goal: Free from fall injury  Outcome: Progressing     Problem: Pain  Goal: Verbalizes/displays adequate comfort level or baseline comfort level  Outcome: Progressing  Flowsheets (Taken 12/13/2023 2117)  Verbalizes/displays adequate comfort level or baseline comfort level: Encourage patient to monitor pain and request assistance     Problem: ABCDS Injury Assessment  Goal: Absence of physical injury  Outcome: Progressing  Flowsheets (Taken 12/14/2023 0808)  Absence of Physical Injury: Implement safety measures based on patient assessment
alexander.       Tresa Sue will be seen a minimum of 3 hours of therapy per day, a minimum of 5 out of 7 days per week  (please see above for specific treatment plan per PT/OT/SLP). [] In this rare instance due to the nature of this patient's medical involvement, this patient will be seen 15 hours per week (900 minutes within a 7 day period). In addition, dietician/nutritionist may monitor calorie count as well as intake and collaboratively work with SLP on dietary upgrades. Neuropsychology/Psychology may evaluate and provide necessary support. Medical issues being managed closely and that require 24 hour availability of a physician:  [] Swallowing Precautions  [x] Bowel/Bladder Fx  [] Weight bearing precautions  [x] Wound Care    [x] Pain Mgmt   [x] Infection Protection  [x] DVT Prophylaxis   [x] Fall Precautions  [x] Fluid/Electrolyte/Nutrition Balance  [] Voice Protection   [] Respiratory  [] Other:    Medical Prognosis: [x] Good  [] Fair    [] Guarded   Total expected IRF days: 10  Anticipated discharge destination: Home  [x] Home Independently   [] Home with supervision    []SNF     [] Other                                           Physician anticipated functional outcomes:  By discharge, the patient will progress to being independent with all mobility and activities. IPOC brief synthesis:   Pauline Chu is a 59year old male with a past medical history significant for CAD, COPD, HTN, HLD, neuropathy, and tobacco use who [facility] to [facility] for planned open AAA repair and left renal artery implantation. I have reviewed this initial plan of care and agree with its contents:    Title   Name    Date    Time    Physician: RAY EatonP.H  PM&R  12/13/2023  10:07 AM      Case Mgmt: Da Sullivan MSW, LSW 12/12/2023 @ 15:46. Sha Lange OTR/L [id number] 12/13/2023 10:38 AM    PT: Genesis Del Rosario DPT [id number] 12/12/2023    RN: Baltazar Murdock.  Susan TIMMONS, RN, [facility]

## 2023-12-19 NOTE — CARE COORDINATION
[facility] received a referral to this patient for a shower chair. Shower chair has been delivered to the patient's room for discharge. Thank you for the referral.  Electronically signed by Anni Dinh on 12/19/2023 at 9:59 AM  Cell ph# [phone]    NOTE: After 5:00 pm, Weekends, Holidays: Call [facility] at [phone] to coordinate delivery of home medical equipment.

## 2023-12-19 NOTE — PROGRESS NOTES
Pt discharged as per physician order. Pt's belonging packed and taken with pt while transferring. Pt got his shower chair. All questions and queries answered. Discharge instruction reviewed. Pt stable at this time. Pt being transported by family. Pt dropped to the car via wheelchair.

## 2023-12-19 NOTE — PROGRESS NOTES
Morning assessment completed. Pt comfortably resting in chair. VSS. Complain of pain, prn given. Morning meds given per MAR. PWWW. AXOX4. Call light and bedside table in reach. Chair locked and alarm on. The care plan and education has been reviewed and mutually agreed upon with the patient.

## 2024-01-02 ENCOUNTER — HOSPITAL ENCOUNTER (OUTPATIENT)
Dept: PHYSICAL THERAPY | Age: 59
Setting detail: THERAPIES SERIES
Discharge: HOME | End: 2024-01-02
Payer: COMMERCIAL

## 2024-01-02 DIAGNOSIS — R68.89 DECREASED STRENGTH, ENDURANCE, AND MOBILITY: Primary | ICD-10-CM

## 2024-01-02 DIAGNOSIS — R29.898 BILATERAL LEG WEAKNESS: ICD-10-CM

## 2024-01-02 DIAGNOSIS — Z74.09 DECREASED STRENGTH, ENDURANCE, AND MOBILITY: Primary | ICD-10-CM

## 2024-01-02 DIAGNOSIS — R29.898 BILATERAL ARM WEAKNESS: ICD-10-CM

## 2024-01-02 DIAGNOSIS — R53.1 DECREASED STRENGTH, ENDURANCE, AND MOBILITY: Primary | ICD-10-CM

## 2024-01-02 PROCEDURE — 97161 PT EVAL LOW COMPLEX 20 MIN: CPT

## 2024-01-02 PROCEDURE — 97110 THERAPEUTIC EXERCISES: CPT

## 2024-01-02 NOTE — PLAN OF CARE
[facility] - [facility] and Therapy [address], [place] [address] office: [phone] fax: [phone]     Physical Therapy Certification      Dear Rodrigo Reyes DO,    We had the pleasure of evaluating the following patient for physical therapy services at [facility].  A summary of our findings can be found in the initial assessment below.  This includes our plan of care.  If you have any questions or concerns regarding these findings, please do not hesitate to contact me at the office phone number listed above.  Thank you for the referral.     Physician Signature:_______________________________Date:__________________  By signing above (or electronic signature), therapist’s plan is approved by physician       Initial Evaluation   Patient: Arnoldo Krishnamurthy (58 y.o. male)   Examination Date: 2024   :  1965 MRN: [id number]   Visit #: 1    Insurance: Payor: [employer] / Plan: [employer] [place] MEDICAID / Product Type: *No Product type* /   Insurance ID: [id number] - (Medicaid Managed)  Secondary Insurance (if applicable):    Treatment Diagnosis:     ICD-10-CM    1. Decreased strength, endurance, and mobility  R53.1     Z74.09     R68.89       2. Bilateral arm weakness  R29.898       3. Bilateral leg weakness  R29.898          Medical Diagnosis:  Other malaise [R53.81]   Referring Physician: Rodrigo Reyes DO  PCP: Pan Coe MD                                             Precautions/ Contra-indications:           Latex allergy:  NO  Pacemaker:    NO  Contraindications for Manipulation: NA  Date of Surgery: 23 - abdominal aortic aneurism repair   Other:    Red Flags:  None    C-SSRS Triggered by Intake questionnaire:   [] No, Questionnaire did not trigger screening.   [x] Yes, Patient intake triggered further evaluation      [] C-SSRS Screening completed  [] PCP notified via Plan of Care  [] Emergency services

## 2024-01-02 NOTE — FLOWSHEET NOTE
[facility] - [facility] Rehabilitation and Therapy [address], [place] [address] office: [phone] fax: [phone]      Physical Therapy: TREATMENT/PROGRESS NOTE   Patient: Arnoldo Krishnamurthy (58 y.o. male)   Treatment Date: 2024   :  1965 MRN: [id number]   Visit #: 1   Insurance Allowable Auth Needed   1 + ? [x]Yes    []No    Insurance: Payor: [employer] / Plan: [employer] [place] MEDICAID / Product Type: *No Product type* /   Insurance ID: [id number] - (Medicaid Managed)  Secondary Insurance (if applicable):    Treatment Diagnosis:     ICD-10-CM    1. Decreased strength, endurance, and mobility  R53.1     Z74.09     R68.89       2. Bilateral arm weakness  R29.898       3. Bilateral leg weakness  R29.898          Medical Diagnosis:    Other malaise [R53.81]   Referring Physician: Rodrigo Reyes DO  PCP: Pan Coe MD                             Plan of care signed (Y/N): N, sent 24    Date of Patient follow up with Physician: next week     Progress Report/POC: EVAL today  POC update due: (10 visits /OR AUTH LIMITS, whichever is less) visit 10 or  2024     Abdominal aortic aneurysm repair Dec 2023, CAD, COPD, HTN, anxiety, depression    Preferred Language for Healthcare:   [x]English       []other:    SUBJECTIVE EXAMINATION     Patient Report/Comments: see eval     Test used Initial score  2024   Pain Summary VAS 6/10    Functional questionnaire LEFS 21/80, 73%    Other:                OBJECTIVE EXAMINATION     Observation:     Test measurements: see eval    Exercises/Interventions:     Therapeutic Ex (26547)  resistance Sets/time Reps Notes/Cues/Progressions   PPT in hooklying  PPT with march   Bridge  SLR    1  1  1  1 X10  X10  X10  x10                                                                   Manual Intervention (72752)  TIME                                        NMR re-education (16854) resistance Sets/time Reps CUES NEEDED

## 2024-01-09 ENCOUNTER — HOSPITAL ENCOUNTER (OUTPATIENT)
Dept: PHYSICAL THERAPY | Age: 59
Setting detail: THERAPIES SERIES
Discharge: HOME | End: 2024-01-09
Payer: COMMERCIAL

## 2024-01-09 PROCEDURE — 97112 NEUROMUSCULAR REEDUCATION: CPT

## 2024-01-09 PROCEDURE — 97110 THERAPEUTIC EXERCISES: CPT

## 2024-01-09 NOTE — FLOWSHEET NOTE
[facility] - Outpatient Rehabilitation and Therapy [address], [place] [address] office: [phone] fax: [phone]      Physical Therapy: TREATMENT/PROGRESS NOTE   Patient: Arnoldo Krishnamurthy (58 y.o. male)   Treatment Date: 2024   :  1965 MRN: [id number]   Visit #:   Insurance Allowable Auth Needed   24 [x]Yes    []No    Insurance: Payor: [employer] / Plan: [employer] [place] MEDICAID / Product Type: *No Product type* /   Insurance ID: [id number] - (Medicaid Managed)  Secondary Insurance (if applicable):    Treatment Diagnosis:     ICD-10-CM    1. Decreased strength, endurance, and mobility  R53.1     Z74.09     R68.89       2. Bilateral arm weakness  R29.898       3. Bilateral leg weakness  R29.898          Medical Diagnosis:    Other malaise [R53.81]   Referring Physician: Rodrigo Reyes DO  PCP: Pan Coe MD                             Plan of care signed (Y/N): N, sent 24    Date of Patient follow up with Physician: next week     Progress Report/POC: NO  POC update due: (10 visits /OR AUTH LIMITS, whichever is less) visit 10 or  2024     Abdominal aortic aneurysm repair Dec 2023, CAD, COPD, HTN, anxiety, depression    Preferred Language for Healthcare:   [x]English       []other:    SUBJECTIVE EXAMINATION     Patient Report/Comments: Pt reports he is noticing some swelling in his abdomin. Goes back to vascular surgeon on Friday for a check up. Has been doing HEP, but painful. Legs fell really weak.      Test used Initial score  2024   Pain Summary VAS 6/10 8/10   Functional questionnaire LEFS 21/80, 73%    Other:                OBJECTIVE EXAMINATION     Observation:   : patient holding shoulders high due to pain - able to relax with cues    Test measurements:     Exercises/Interventions:     Therapeutic Ex (57736)  resistance Sets/time Reps Notes/Cues/Progressions   PPT in hooklying  PPT with march   Bridge  SLR       Step

## 2024-01-11 ENCOUNTER — HOSPITAL ENCOUNTER (OUTPATIENT)
Dept: PHYSICAL THERAPY | Age: 59
Setting detail: THERAPIES SERIES
Discharge: HOME | End: 2024-01-11
Payer: COMMERCIAL

## 2024-01-11 PROCEDURE — 97110 THERAPEUTIC EXERCISES: CPT

## 2024-01-11 PROCEDURE — 97112 NEUROMUSCULAR REEDUCATION: CPT

## 2024-01-11 NOTE — FLOWSHEET NOTE
[facility] - Outpatient Rehabilitation and Therapy [address], [place] [address] office: [phone] fax: [phone]      Physical Therapy: TREATMENT/PROGRESS NOTE   Patient: Arnoldo Krishnamurthy (58 y.o. male)   Treatment Date: 2024   :  1965 MRN: [id number]   Visit #: 3 /24  Insurance Allowable Auth Needed   24 [x]Yes    []No    Insurance: Payor: [employer] / Plan: [employer] [place] MEDICAID / Product Type: *No Product type* /   Insurance ID: [id number] - (Medicaid Managed)  Secondary Insurance (if applicable):    Treatment Diagnosis:     ICD-10-CM    1. Decreased strength, endurance, and mobility  R53.1     Z74.09     R68.89       2. Bilateral arm weakness  R29.898       3. Bilateral leg weakness  R29.898          Medical Diagnosis:    Other malaise [R53.81]   Referring Physician: Rodrigo Reyes DO  PCP: Pan Coe MD                             Plan of care signed (Y/N): N, sent 24    Date of Patient follow up with Physician: next week     Progress Report/POC: NO  POC update due: (10 visits /OR AUTH LIMITS, whichever is less) visit 10 or  2024     Abdominal aortic aneurysm repair Dec 2023, CAD, COPD, HTN, anxiety, depression    Preferred Language for Healthcare:   [x]English       []other:    SUBJECTIVE EXAMINATION     Patient Report/Comments: Pt reports he us still only sleeping about 2 hours at a time. Goes to surgeon tomorrow. Feels like the stretches added last visit have been very helpful.      Test used Initial score  2024   Pain Summary VAS 6/10 8/10   Functional questionnaire LEFS 21/80, 73%    Other:                OBJECTIVE EXAMINATION     Observation:   : advised patient to discuss pain with MD tomorrow, however due to the nature of the surgery, high pain is a normal response and should continue to improve as time goes on   : patient holding shoulders high due to pain - able to relax with cues    Test measurements:

## 2024-01-12 ENCOUNTER — OFFICE VISIT (OUTPATIENT)
Dept: VASCULAR SURGERY | Age: 59
End: 2024-01-12

## 2024-01-12 VITALS
SYSTOLIC BLOOD PRESSURE: 140 MMHG | BODY MASS INDEX: 25.48 KG/M2 | WEIGHT: 172 LBS | HEIGHT: 69 IN | DIASTOLIC BLOOD PRESSURE: 78 MMHG

## 2024-01-12 DIAGNOSIS — Z09 POSTOPERATIVE EXAMINATION: Primary | ICD-10-CM

## 2024-01-12 PROCEDURE — 99024 POSTOP FOLLOW-UP VISIT: CPT | Performed by: SURGERY

## 2024-01-12 NOTE — PROGRESS NOTES
Postop Progress Note    Subjective    Arnoldo Krishnamurthy presents to the office for postop follow up.  Reports some ongoing incisional pain- especially at night.  Started home PT.      Objective    Vitals:    01/12/24 0958   BP: (!) 140/78     General: alert, cooperative and no distress  Incision: healing well    Assessment  Doing well postoperatively.    Plan  Continue PT  If ongoing discomfort can try Neurontin and then pain clinic referral for nerve block.     Electronically signed by Ramírez Lange MD on 1/12/2024 at 10:22 AM

## 2024-01-16 ENCOUNTER — HOSPITAL ENCOUNTER (OUTPATIENT)
Dept: PHYSICAL THERAPY | Age: 59
Setting detail: THERAPIES SERIES
Discharge: HOME | End: 2024-01-16
Payer: COMMERCIAL

## 2024-01-16 PROCEDURE — 97110 THERAPEUTIC EXERCISES: CPT

## 2024-01-16 PROCEDURE — 97140 MANUAL THERAPY 1/> REGIONS: CPT

## 2024-01-16 PROCEDURE — 97112 NEUROMUSCULAR REEDUCATION: CPT

## 2024-01-16 NOTE — FLOWSHEET NOTE
[facility] - Outpatient Rehabilitation and Therapy [address], [place] [address] office: [phone] fax: [phone]      Physical Therapy: TREATMENT/PROGRESS NOTE   Patient: Arnoldo Krishnamurthy (58 y.o. male)   Treatment Date: 2024   :  1965 MRN: [id number]   Visit #:   Insurance Allowable Auth Needed   24 [x]Yes    []No    Insurance: Payor: [employer] / Plan: [employer] [place] MEDICAID / Product Type: *No Product type* /   Insurance ID: [id number] - (Medicaid Managed)  Secondary Insurance (if applicable):    Treatment Diagnosis:     ICD-10-CM    1. Decreased strength, endurance, and mobility  R53.1     Z74.09     R68.89       2. Bilateral arm weakness  R29.898       3. Bilateral leg weakness  R29.898          Medical Diagnosis:    Other malaise [R53.81]   Referring Physician: Rodrigo Reyes DO  PCP: Pan Coe MD                             Plan of care signed (Y/N): N, sent 24    Date of Patient follow up with Physician: next week     Progress Report/POC: NO  POC update due: (10 visits /OR AUTH LIMITS, whichever is less) visit 10 or  2024     Abdominal aortic aneurysm repair Dec 2023, CAD, COPD, HTN, anxiety, depression    Preferred Language for Healthcare:   [x]English       []other:    SUBJECTIVE EXAMINATION     Patient Report/Comments: Pt reports he was surgeon last week who suggested seeking pain management. Pt also added Advil to his tylenol regime.      Test used Initial score  2024   Pain Summary VAS 6/10 8/10   Functional questionnaire LEFS 21/80, 73%    Other:                OBJECTIVE EXAMINATION     Observation:   :     : advised patient to discuss pain with MD tomorrow, however due to the nature of the surgery, high pain is a normal response and should continue to improve as time goes on   : patient holding shoulders high due to pain - able to relax with cues    Test measurements:     Exercises/Interventions:

## 2024-01-18 ENCOUNTER — HOSPITAL ENCOUNTER (OUTPATIENT)
Dept: PHYSICAL THERAPY | Age: 59
Setting detail: THERAPIES SERIES
Discharge: HOME | End: 2024-01-18
Payer: COMMERCIAL

## 2024-01-18 PROCEDURE — 97140 MANUAL THERAPY 1/> REGIONS: CPT

## 2024-01-18 PROCEDURE — 97110 THERAPEUTIC EXERCISES: CPT

## 2024-01-18 NOTE — FLOWSHEET NOTE
manual lymphatic drainage, manual traction) for the purpose of modulating pain, promoting relaxation,  increasing ROM, reducing/eliminating soft tissue swelling/inflammation/restriction, improving soft tissue extensibility and allowing for proper ROM for normal function with self care, mobility, lifting and ambulation    TREATMENT PLAN   Plan: Cont POC- Continue emphasis/focus on exercise progression, improving proper muscle recruitment and activation/motor control patterns, modulating pain, promoting relaxation, increasing ROM, and improving soft tissue extensibility. Next visit plan to progress weights, progress reps, add new exercises, and adjust HEP  Progress abdominal activation and UE strengthening. Trial leg press     Electronically Signed by Magalis Amin PT , DPT            Date: 01/18/2024  Teresa Lopez, SPT    Therapist was present, directed the patient's care, made skilled judgement, and was responsible for assessment and treatment of the patient.       Note: If patient does not return for scheduled/recommended follow up visits, this note will serve as a discharge from care along with the most recent update on progress.

## 2024-01-23 ENCOUNTER — HOSPITAL ENCOUNTER (OUTPATIENT)
Dept: PHYSICAL THERAPY | Age: 59
Setting detail: THERAPIES SERIES
Discharge: HOME | End: 2024-01-23
Payer: COMMERCIAL

## 2024-01-23 PROCEDURE — 97530 THERAPEUTIC ACTIVITIES: CPT

## 2024-01-23 PROCEDURE — 97110 THERAPEUTIC EXERCISES: CPT

## 2024-01-23 NOTE — FLOWSHEET NOTE
with additional sets and reps.  Verbal and tactile cues required for scapular proprioception during UE strengthening ex. Patient would continue to benefit from ongoing evaluation and advanced clinical decision from a Physical Therapist to address and improve pain control, ROM, muscle strength, and functional mobility to safely return to PLOF without symptoms and restrictions.    Medical Necessity Documentation:  I certify that this patient meets the below criteria necessary for medical necessity for care and/or justification of therapy services:  The patient has a musculoskeletal condition(s) with a corresponding ICD-10 code that is of complexity and severity that require skilled therapeutic intervention. This has a direct and significant impact on the need for therapy and significantly impacts the rate of recovery.   The patient has a complexity identified by an ICD-10 code that has a direct and significant impact on the need for therapy.  (Significantly impacts the rate of recovery and is associated with a primary condition.)     Treatment/Activity Tolerance:  [x] Patient tolerated treatment well [] Patient limited by fatique  [] Patient limited by pain  [] Patient limited by other medical complications  [] Other:     Return to Play: NA    Prognosis for POC: [x] Good [] Fair  [] Poor    Patient requires continued skilled intervention: [x] Yes  [] No      GOALS     Patient stated goal: get back to his normal self, be able to do ADLs without pain  Status:  [] Progressing: [] Met: [] Not Met: [] Adjusted    Therapist goals for Patient:   Short Term Goals: To be achieved in: 2 weeks  Independent in HEP and progression per patient tolerance, in order to progress toward full function and prevent re-injury.    Status: [] Progressing: [] Met: [] Not Met: [] Adjusted  Patient will have a decrease in pain to 2/10 to help facilitate improvement in movement, function, and ADLs as indicated by functional deficits.   Status: []

## 2024-01-25 ENCOUNTER — HOSPITAL ENCOUNTER (OUTPATIENT)
Dept: PHYSICAL THERAPY | Age: 59
Setting detail: THERAPIES SERIES
Discharge: HOME | End: 2024-01-25
Payer: COMMERCIAL

## 2024-01-25 PROCEDURE — 97110 THERAPEUTIC EXERCISES: CPT

## 2024-01-25 PROCEDURE — 97140 MANUAL THERAPY 1/> REGIONS: CPT

## 2024-01-25 PROCEDURE — 97530 THERAPEUTIC ACTIVITIES: CPT

## 2024-01-25 NOTE — FLOWSHEET NOTE
Patient demos improved trunk rotation with gait when ambulating into clinic.   1/18: incision appears closed with improved mobility. Patient continues to demonstrate mild gait deviations but has improved from initial session. VC requires to decrease shoulder elevation during UE tasks.     1/11: advised patient to discuss pain with MD tomorrow, however due to the nature of the surgery, high pain is a normal response and should continue to improve as time goes on   1/9: patient holding shoulders high due to pain - able to relax with cues    Test measurements:     Exercises/Interventions:     Therapeutic Ex (88727)  resistance Sets/time Reps Notes/Cues/Progressions   PPT in hooklying  PPT with march   Bridge  SLR  90/90 toe taps  1  1 X10  x10   1/18     Step one  Level 1   6 min    1/9 1/16 - plan to add resistance in future sessions   1/25   3 way hip at barre  1/9 heavy VCs for posture, miracle with ext    Squats at barre  1/9   Scap retract around small bolster in sitting with deep breath in with open  1/9   Stands from standard chair no UE support   1/9, 1/11   Diagonal pulls with spine on wall  1/11 1/16   Seated marches no back support     Seated hip flexion over trash can 1/11 TCs and VCs for erect posture and relaxing UTs  1/16   Total gym:   DL Leg press  SL Leg press    1  1   X20  X15 B 1/18 1/25   Middle rows  LPD  High rows 20lbs  20lbs  \" 2  2  2 X10  X10  x10 1/23 VC to decrease scapular elevation   Leg press 1/23   Chops 7.5# kettle  1 x10 1/25          Manual Intervention (55381)  TIME     STM- scar mobility with free up   X 12 min   1/16 along incision   1/18 along incision  1/23 performed first by SPT then patient VC  1/25 adhesions still present anteriorly and midway through incision                                NMR re-education (41545) resistance Sets/time Reps CUES NEEDED   Thor ext over small bolster  1/9 1/16: 5 deep breaths  1/18   Thread the needle on barre with open 1/22   UT stretch   LS

## 2024-01-30 ENCOUNTER — HOSPITAL ENCOUNTER (OUTPATIENT)
Dept: PHYSICAL THERAPY | Age: 59
Setting detail: THERAPIES SERIES
Discharge: HOME | End: 2024-01-30
Payer: COMMERCIAL

## 2024-01-30 PROCEDURE — 97110 THERAPEUTIC EXERCISES: CPT

## 2024-01-30 PROCEDURE — 97112 NEUROMUSCULAR REEDUCATION: CPT

## 2024-01-30 PROCEDURE — 97140 MANUAL THERAPY 1/> REGIONS: CPT

## 2024-01-30 NOTE — PLAN OF CARE
[facility] - Outpatient Rehabilitation and Therapy [address], [place] [address] office: [phone] fax: [phone]    Physical Therapy Re-Certification Plan of Care    Dear Rodrigo Reyes DO  ,    We had the pleasure of treating the following patient for physical therapy services at [facility]. A summary of our findings can be found in the updated assessment below.  This includes our plan of care.  If you have any questions or concerns regarding these findings, please do not hesitate to contact me at the office phone number checked above.  Thank you for the referral.     Physician Signature:________________________________Date:__________________  By signing above (or electronic signature), therapist's plan is approved by physician      Functional Outcome: LEFS 29/80; 63%   Arnoldo Krishnamurthy 1965 continues to present with functional deficits in ROM, strength symmetry, flexibility, and cardiovascular endurance  limiting ability with managing community ambulation, walking up/down stairs, transitions between positions, carrying items, lifting items, and heavy home activity .  During therapy this date, patient required visual cueing, verbal cueing, and tactile cueing for improving proper muscle recruitment and activation/motor control patterns, modulating pain, promoting relaxation, increasing ROM, reduce/eliminate soft tissue swelling/inflammation/restriction, and static and dynamic balance. Patient will continue to benefit from ongoing evaluation and advanced clinical decision from a Physical Therapist to improve pain control, ROM, muscle strength, endurance, functional mobility, and tolerance to work activity to safely return to [facility] without symptoms or restrictions.    Overall Response to Treatment:   [x]Patient is responding well to treatment and improvement is noted with regards to goals   []Patient should continue to improve in reasonable time

## 2024-02-06 ENCOUNTER — HOSPITAL ENCOUNTER (OUTPATIENT)
Dept: PHYSICAL THERAPY | Age: 59
Setting detail: THERAPIES SERIES
Discharge: HOME | End: 2024-02-06
Payer: COMMERCIAL

## 2024-02-06 PROCEDURE — 97110 THERAPEUTIC EXERCISES: CPT

## 2024-02-06 NOTE — FLOWSHEET NOTE
patient has a complexity identified by an ICD-10 code that has a direct and significant impact on the need for therapy.  (Significantly impacts the rate of recovery and is associated with a primary condition.)     Treatment/Activity Tolerance:  [x] Patient tolerated treatment well [] Patient limited by fatique  [] Patient limited by pain  [] Patient limited by other medical complications  [] Other:     Return to Play: NA    Prognosis for POC: [x] Good [] Fair  [] Poor    Patient requires continued skilled intervention: [x] Yes  [] No      GOALS     Patient stated goal: get back to his normal self, be able to do ADLs without pain  Status:  [x] Progressing: [] Met: [] Not Met: [] Adjusted    Therapist goals for Patient:   Short Term Goals: To be achieved in: 2 weeks  Independent in HEP and progression per patient tolerance, in order to progress toward full function and prevent re-injury.    Status: [] Progressing: [x] Met: [] Not Met: [] Adjusted  Patient will have a decrease in pain to 2/10 to help facilitate improvement in movement, function, and ADLs as indicated by functional deficits.   Status: [] Progressing: [] Met: [x] Not Met: [] Adjusted    Long Term Goals: To be achieved in: 4 weeks  Disability index score of 30% or less for the LEFS to assist with return top prior level of function.               Status: [x] Progressing: [] Met: [] Not Met: [] Adjusted  Pt will demo ability to lay on both sides comfortably to progress towards sleeping 4 hour or more each night to help with healing process.   Status: [x] Progressin/2 hour at a time  [] Met: [] Not Met: [] Adjusted  Pt to improve strength to 5/5 in proximal hips and rotator cuffs to allow for proper muscle and joint use in functional mobility, ADLs and prior level of function   Status: [x] Progressing: [] Met: [] Not Met: [] Adjusted  Patient will return to  getting in/out of a car  without increased symptoms or restriction to work towards return to prior

## 2024-02-08 ENCOUNTER — HOSPITAL ENCOUNTER (OUTPATIENT)
Dept: PHYSICAL THERAPY | Age: 59
Setting detail: THERAPIES SERIES
Discharge: HOME | End: 2024-02-08
Payer: COMMERCIAL

## 2024-02-08 PROCEDURE — 97110 THERAPEUTIC EXERCISES: CPT

## 2024-02-08 PROCEDURE — 97112 NEUROMUSCULAR REEDUCATION: CPT

## 2024-02-08 NOTE — FLOWSHEET NOTE
[facility] - Outpatient Rehabilitation and Therapy [address], [place] [address] office: [phone] fax: [phone]      Physical Therapy: TREATMENT/PROGRESS NOTE   Patient: Arnoldo Krishnamurthy (58 y.o. male)   Treatment Date: 2024   :  1965 MRN: [id number]   Visit #: 10 / 14  Insurance Allowable Auth Needed   24  24-24 [x]Yes    []No    Insurance: Payor: [employer] / Plan: [employer] [place] MEDICAID / Product Type: *No Product type* /   Insurance ID: [id number] - (Medicaid Managed)  Secondary Insurance (if applicable):    Treatment Diagnosis:     ICD-10-CM    1. Decreased strength, endurance, and mobility  R53.1     Z74.09     R68.89       2. Bilateral arm weakness  R29.898       3. Bilateral leg weakness  R29.898          Medical Diagnosis:    Other malaise [R53.81]   Referring Physician: Rodrigo Reyes DO  PCP: Pan Coe MD                             Plan of care signed (Y/N): Y    Date of Patient follow up with Physician: ?     Progress Report/POC: NO  POC update due: (10 visits /OR AUTH LIMITS, whichever is less) visit 14 or  3/1/2024     Abdominal aortic aneurysm repair Dec 2023, CAD, COPD, HTN, anxiety, depression    Preferred Language for Healthcare:   [x]English       []other:    SUBJECTIVE EXAMINATION     Patient Report/Comments:  Pt reports that he felt sore after last session especially around the hips. Despite soreness, patient felt he got a good work out in.      Test used Initial score  2024   Pain Summary VAS 6/10 7/10   Functional questionnaire LEFS 21/80, 73%    Other:                OBJECTIVE EXAMINATION       Observation:   : Scar tissue appears pink and demonstrates improved mobility  : Patient is still apprehensive about viewing his abdominal scar despite performing scar mobilization independently. Patient demos improved trunk rotation with gait when ambulating into clinic.   : incision appears closed  Him/He

## 2024-02-13 ENCOUNTER — HOSPITAL ENCOUNTER (OUTPATIENT)
Dept: PHYSICAL THERAPY | Age: 59
Setting detail: THERAPIES SERIES
Discharge: HOME | End: 2024-02-13
Payer: COMMERCIAL

## 2024-02-13 PROCEDURE — 97110 THERAPEUTIC EXERCISES: CPT

## 2024-02-13 NOTE — FLOWSHEET NOTE
[facility] - Outpatient Rehabilitation and Therapy [address], [place] [address] office: [phone] fax: [phone]      Physical Therapy: TREATMENT/PROGRESS NOTE   Patient: Arnoldo Krishnamurthy (58 y.o. male)   Treatment Date: 2024   :  1965 MRN: [id number]   Visit #:   Insurance Allowable Auth Needed   24  24-24 [x]Yes    []No    Insurance: Payor: [employer] / Plan: [employer] [place] MEDICAID / Product Type: *No Product type* /   Insurance ID: [id number] - (Medicaid Managed)  Secondary Insurance (if applicable):    Treatment Diagnosis:     ICD-10-CM    1. Decreased strength, endurance, and mobility  R53.1     Z74.09     R68.89       2. Bilateral arm weakness  R29.898       3. Bilateral leg weakness  R29.898          Medical Diagnosis:    Other malaise [R53.81]   Referring Physician: Rodrigo Reyes DO  PCP: Pan Coe MD                             Plan of care signed (Y/N): Y    Date of Patient follow up with Physician: ?     Progress Report/POC: NO  POC update due: (10 visits /OR AUTH LIMITS, whichever is less) visit 14 or  3/1/2024     Abdominal aortic aneurysm repair Dec 2023, CAD, COPD, HTN, anxiety, depression    Preferred Language for Healthcare:   [x]English       []other:    SUBJECTIVE EXAMINATION     Patient Report/Comments:  Pt has an appointment with pain management tomorrow. States that he was sore for multiple days following adduction machine. Pt also states that he has a fear of falling despite never falling before.       Test used Initial score  2024   Pain Summary VAS 6/10 7/10   Functional questionnaire LEFS 21/80, 73%    Other:                OBJECTIVE EXAMINATION       Observation:   : Scar tissue appears pink and demonstrates improved mobility  : Patient is still apprehensive about viewing his abdominal scar despite performing scar mobilization independently. Patient demos improved trunk rotation with

## 2024-02-14 ENCOUNTER — TRANSCRIBE ORDERS (OUTPATIENT)
Dept: ADMINISTRATIVE | Age: 59
End: 2024-02-14

## 2024-02-14 DIAGNOSIS — M51.37 DEGENERATION OF LUMBAR OR LUMBOSACRAL INTERVERTEBRAL DISC: Primary | ICD-10-CM

## 2024-02-15 ENCOUNTER — HOSPITAL ENCOUNTER (OUTPATIENT)
Dept: PHYSICAL THERAPY | Age: 59
Setting detail: THERAPIES SERIES
Discharge: HOME | End: 2024-02-15
Payer: COMMERCIAL

## 2024-02-15 PROCEDURE — 97112 NEUROMUSCULAR REEDUCATION: CPT

## 2024-02-15 PROCEDURE — 97110 THERAPEUTIC EXERCISES: CPT

## 2024-02-15 NOTE — FLOWSHEET NOTE
Code Tx Minutes 42 3       Total Treatment Minutes 42        Charge Justification:  (34733) THERAPEUTIC EXERCISE - Provided verbal/tactile cueing for activities related to strengthening, flexibility, endurance, ROM performed to prevent loss of range of motion, maintain or improve muscular strength or increase flexibility, following either an injury or surgery.   (11874) NEUROMUSCULAR RE-EDUCATION - Therapeutic procedure, 1 or more areas, each 15 minutes; neuromuscular reeducation of movement, balance, coordination, kinesthetic sense, posture, and/or proprioception for sitting and/or standing activities    TREATMENT PLAN   Plan: Cont POC- Continue emphasis/focus on exercise progression, improving proper muscle recruitment and activation/motor control patterns, modulating pain, promoting relaxation, improving soft tissue extensibility, static and dynamic balance, and kinesthetic sense and proprioception. Next visit plan to add new exercises and progress balance     Electronically Signed by Magalis Amin PT , DPT            Date: 02/15/2024  Teresa Lopez SPT    Therapist was present, directed the patient's care, made skilled judgement, and was responsible for assessment and treatment of the patient.       Note: If patient does not return for scheduled/recommended follow up visits, this note will serve as a discharge from care along with the most recent update on progress.

## 2024-02-20 ENCOUNTER — HOSPITAL ENCOUNTER (OUTPATIENT)
Dept: PHYSICAL THERAPY | Age: 59
Setting detail: THERAPIES SERIES
Discharge: HOME | End: 2024-02-20
Payer: COMMERCIAL

## 2024-02-20 ENCOUNTER — HOSPITAL ENCOUNTER (OUTPATIENT)
Dept: CT IMAGING | Age: 59
Discharge: HOME | End: 2024-02-20
Attending: ANESTHESIOLOGY
Payer: COMMERCIAL

## 2024-02-20 DIAGNOSIS — M51.37 DEGENERATION OF LUMBAR OR LUMBOSACRAL INTERVERTEBRAL DISC: ICD-10-CM

## 2024-02-20 PROCEDURE — 97110 THERAPEUTIC EXERCISES: CPT

## 2024-02-20 PROCEDURE — 97112 NEUROMUSCULAR REEDUCATION: CPT

## 2024-02-20 PROCEDURE — 72131 CT LUMBAR SPINE W/O DYE: CPT

## 2024-02-20 NOTE — FLOWSHEET NOTE
[facility] - [facility] [address], [place] [address] office: [phone] fax: [phone]      Physical Therapy: TREATMENT/PROGRESS NOTE   Patient: Arnoldo Krishnamurthy (58 y.o. male)   Treatment Date: 2024   :  1965 MRN: [id number]   Visit #:   Insurance Allowable Auth Needed   24  24-24 [x]Yes    []No    Insurance: Payor: [employer] / Plan: [employer] [place] MEDICAID / Product Type: *No Product type* /   Insurance ID: [id number] - (Medicaid Managed)  Secondary Insurance (if applicable):    Treatment Diagnosis:     ICD-10-CM    1. Decreased strength, endurance, and mobility  R53.1     Z74.09     R68.89       2. Bilateral arm weakness  R29.898       3. Bilateral leg weakness  R29.898          Medical Diagnosis:    Other malaise [R53.81]   Referring Physician: Rodrigo Reyes DO  PCP: Pan Coe MD                             Plan of care signed (Y/N): Y    Date of Patient follow up with Physician: ?     Progress Report/POC: NO  POC update due: (10 visits /OR AUTH LIMITS, whichever is less) visit 14 or  3/1/2024     Abdominal aortic aneurysm repair Dec 2023, CAD, COPD, HTN, anxiety, depression    Preferred Language for Healthcare:   [x]English       []other:    SUBJECTIVE EXAMINATION     Patient Report/Comments:  Pt reports he is getting his CT tonight. Worried because he has to lay on his stomach thinks its going to be painful. Feeling sore today.      Test used Initial score  2024   Pain Summary VAS 6/10 7/10 7-8/10   Functional questionnaire LEFS 21/80, 73% 29/80, 63%     Other:                  OBJECTIVE EXAMINATION       Observation:   : Scar tissue appears pink and demonstrates improved mobility  : Patient is still apprehensive about viewing his abdominal scar despite performing scar mobilization independently. Patient demos improved trunk rotation with gait when ambulating into clinic.

## 2024-02-22 ENCOUNTER — HOSPITAL ENCOUNTER (OUTPATIENT)
Dept: PHYSICAL THERAPY | Age: 59
Setting detail: THERAPIES SERIES
Discharge: HOME | End: 2024-02-22
Payer: COMMERCIAL

## 2024-02-22 PROCEDURE — 97110 THERAPEUTIC EXERCISES: CPT

## 2024-02-22 PROCEDURE — 97750 PHYSICAL PERFORMANCE TEST: CPT

## 2024-04-08 RX ORDER — ASPIRIN 81 MG/1
81 TABLET, COATED ORAL DAILY
Qty: 90 TABLET | Refills: 1 | Status: SHIPPED | OUTPATIENT
Start: 2024-04-08

## 2024-05-08 NOTE — PROGRESS NOTES
factor modification.    4. Follow up in October 2024    Leyla's attestation:  This note was scribed in the presence of Dr.Vanshipal Michaels by Elenita Maldonado RN     I, Dr. Herman Michaels, personally performed the services described in this documentation, as scribed by the above signed scribe in my presence. It is both accurate and complete to my knowledge. I agree with the details independently gathered by the clinical support staff, while the remaining scribed note accurately describes my personal service to the patient.    Medical Decision Making:  The following items were considered in medical decision making:  Independent review of images  Review / order clinical lab tests  Review / order radiology tests  Decision to obtain old records  Review and summation of old records as accessed through [employer] (a summary of my findings in these old records)        I, Dr. Herman Michaels, personally performed the services described in this documentation, as scribed by the above signed scribe in my presence. It is both accurate and complete to my knowledge. I agree with the details independently gathered by the clinical support staff, while the remaining scribed note accurately describes my personal service to the patient.    Medical Decision Making:  The following items were considered in medical decision making:  Independent review of images  Review / order clinical lab tests  Review / order radiology tests  Decision to obtain old records  Review and summation of old records as accessed through [employer] (a summary of my findings in these old records)      Time Based Itemization  A total of 40 minutes was spent on today's patient encounter.  If applicable, non-patient-facing activities:  (X)Preparing to see the patient and reviewing records  (X) Individual interpretation of results  () Discussion or coordination of care with other health care professionals  () Ordering of unique tests, medications, or

## 2024-05-09 ENCOUNTER — OFFICE VISIT (OUTPATIENT)
Dept: CARDIOLOGY CLINIC | Age: 59
End: 2024-05-09
Payer: COMMERCIAL

## 2024-05-09 VITALS
BODY MASS INDEX: 25.55 KG/M2 | HEIGHT: 69 IN | HEART RATE: 78 BPM | WEIGHT: 172.5 LBS | DIASTOLIC BLOOD PRESSURE: 66 MMHG | OXYGEN SATURATION: 97 % | SYSTOLIC BLOOD PRESSURE: 118 MMHG

## 2024-05-09 DIAGNOSIS — E78.2 MIXED HYPERLIPIDEMIA: ICD-10-CM

## 2024-05-09 DIAGNOSIS — Z98.890 STATUS POST ABDOMINAL AORTIC ANEURYSM (AAA) REPAIR: ICD-10-CM

## 2024-05-09 DIAGNOSIS — Z86.79 STATUS POST ABDOMINAL AORTIC ANEURYSM (AAA) REPAIR: ICD-10-CM

## 2024-05-09 DIAGNOSIS — I25.10 CORONARY ARTERY DISEASE INVOLVING NATIVE CORONARY ARTERY OF NATIVE HEART WITHOUT ANGINA PECTORIS: Primary | ICD-10-CM

## 2024-05-09 DIAGNOSIS — D64.9 ANEMIA, UNSPECIFIED TYPE: ICD-10-CM

## 2024-05-09 PROCEDURE — 3017F COLORECTAL CA SCREEN DOC REV: CPT | Performed by: INTERNAL MEDICINE

## 2024-05-09 PROCEDURE — G8417 CALC BMI ABV UP PARAM F/U: HCPCS | Performed by: INTERNAL MEDICINE

## 2024-05-09 PROCEDURE — 1036F TOBACCO NON-USER: CPT | Performed by: INTERNAL MEDICINE

## 2024-05-09 PROCEDURE — 3074F SYST BP LT 130 MM HG: CPT | Performed by: INTERNAL MEDICINE

## 2024-05-09 PROCEDURE — G8427 DOCREV CUR MEDS BY ELIG CLIN: HCPCS | Performed by: INTERNAL MEDICINE

## 2024-05-09 PROCEDURE — 3078F DIAST BP <80 MM HG: CPT | Performed by: INTERNAL MEDICINE

## 2024-05-09 PROCEDURE — 99215 OFFICE O/P EST HI 40 MIN: CPT | Performed by: INTERNAL MEDICINE

## 2024-05-09 NOTE — PATIENT INSTRUCTIONS
Plan:  1. Order CBC ~ cold and anemic  2. I recommend that the patient continue their currently prescribed medications. Their drug modifiable risk factors appear to be well controlled. I will continue to address the need/dosing of medications in future visits.   3. Patient given detailed instructions on addressing diet, regular exercise, weight control, smoking abstention, medication compliance, and stress minimization. The patient was provided written and verbal instructions regarding risk factor modification.    4. Follow up in October 2024

## 2024-09-06 ENCOUNTER — HOSPITAL ENCOUNTER (OUTPATIENT)
Dept: ULTRASOUND IMAGING | Age: 59
Discharge: HOME | End: 2024-09-06
Payer: COMMERCIAL

## 2024-09-06 DIAGNOSIS — N28.89 URETERAL FISTULA: ICD-10-CM

## 2024-09-06 DIAGNOSIS — N20.0 CALCULUS OF KIDNEY: ICD-10-CM

## 2024-09-06 PROCEDURE — 76770 US EXAM ABDO BACK WALL COMP: CPT

## 2024-10-04 ENCOUNTER — HOSPITAL ENCOUNTER (OUTPATIENT)
Dept: MRI IMAGING | Age: 59
Discharge: HOME | End: 2024-10-04
Attending: INTERNAL MEDICINE
Payer: COMMERCIAL

## 2024-10-04 DIAGNOSIS — D30.02 BENIGN NEOPLASM OF LEFT KIDNEY: ICD-10-CM

## 2024-10-04 PROCEDURE — 74181 MRI ABDOMEN W/O CONTRAST: CPT

## 2024-10-25 NOTE — PROGRESS NOTES
[facility]   Cardiovascular Evaluation    PATIENT: Arnoldo Krishnamurthy  DATE: 10/29/2024  MRN: [id number]  CSN: [id number]  : 1965    Primary Care Doctor/Referring provider: Pan Coe MD, No admitting provider for patient encounter.     Reason for evaluation/Chief complaint:   Follow-up, Hypertension, Hyperlipidemia, and Coronary Artery Disease      Subjective:    History of present illness on initial date of evaluation:   Arnoldo Krishnamurthy is a 58 y.o. male patient who presents for cardiology follow up. He has a past medical history significant for hypertension, carotid artery stenosis, hyperlipidemia, tobacco abuse, and AAA. He was initially referred by  for cardiac risk assessment for abdominal aorta aneurysm open repair. The patient had a abdominal CTA measuring 5.1 cm on 23. He had a Lexiscan stress myoview 23 fixed defect consistent with scar/prior infarction, EF 46%.    An echocardiogram 23 EF 55%, mild LVH. Left heart catheterization 10/18/23 with PCI to RCA with TAWANDA x 2. He was admitted -23 for abdominal aortic aneurysm repair with .    At last office visit CBC and urgent referral to Gastroenterology recommended. Today he reports he was told a cyst was found on his left kidney. He states he is following with specialist for this. Patient currently denies any weight gain, edema, palpitations, chest pain, shortness of breath, dizziness, and syncope. He is taking medications as prescribed. He reports last year was not taking medications consistently. He states since AAA repair has been sore. He is following with PCP regularly.       Patient Active Problem List   Diagnosis    DDD (degenerative disc disease), cervical    Displacement of cervical intervertebral disc without myelopathy    Aneurysm of infrarenal abdominal aorta (HCC)    Anxiety disorder    Benign essential hypertension    Benign prostatic hyperplasia with lower  Lidocaine-epinephrine 1-1:712276 % injection   1mL once for one use, starting 8/18/2023 ending 8/18/2023,  2mL disp, R-0, injection  Injected by Talya Bautista LPN

## 2024-10-29 ENCOUNTER — OFFICE VISIT (OUTPATIENT)
Dept: CARDIOLOGY CLINIC | Age: 59
End: 2024-10-29
Payer: COMMERCIAL

## 2024-10-29 VITALS
WEIGHT: 181.2 LBS | HEIGHT: 69 IN | BODY MASS INDEX: 26.84 KG/M2 | OXYGEN SATURATION: 97 % | DIASTOLIC BLOOD PRESSURE: 80 MMHG | HEART RATE: 88 BPM | SYSTOLIC BLOOD PRESSURE: 134 MMHG

## 2024-10-29 DIAGNOSIS — N28.89 RENAL MASS, LEFT: ICD-10-CM

## 2024-10-29 DIAGNOSIS — Z86.79 STATUS POST ABDOMINAL AORTIC ANEURYSM (AAA) REPAIR: ICD-10-CM

## 2024-10-29 DIAGNOSIS — I25.10 CORONARY ARTERY DISEASE INVOLVING NATIVE CORONARY ARTERY OF NATIVE HEART WITHOUT ANGINA PECTORIS: Primary | ICD-10-CM

## 2024-10-29 DIAGNOSIS — Z98.890 STATUS POST ABDOMINAL AORTIC ANEURYSM (AAA) REPAIR: ICD-10-CM

## 2024-10-29 DIAGNOSIS — I10 BENIGN ESSENTIAL HYPERTENSION: ICD-10-CM

## 2024-10-29 DIAGNOSIS — D64.9 ANEMIA, UNSPECIFIED TYPE: ICD-10-CM

## 2024-10-29 DIAGNOSIS — E78.2 MIXED HYPERLIPIDEMIA: ICD-10-CM

## 2024-10-29 PROCEDURE — 3075F SYST BP GE 130 - 139MM HG: CPT | Performed by: INTERNAL MEDICINE

## 2024-10-29 PROCEDURE — 99214 OFFICE O/P EST MOD 30 MIN: CPT | Performed by: INTERNAL MEDICINE

## 2024-10-29 PROCEDURE — G8417 CALC BMI ABV UP PARAM F/U: HCPCS | Performed by: INTERNAL MEDICINE

## 2024-10-29 PROCEDURE — G8484 FLU IMMUNIZE NO ADMIN: HCPCS | Performed by: INTERNAL MEDICINE

## 2024-10-29 PROCEDURE — 3017F COLORECTAL CA SCREEN DOC REV: CPT | Performed by: INTERNAL MEDICINE

## 2024-10-29 PROCEDURE — 1036F TOBACCO NON-USER: CPT | Performed by: INTERNAL MEDICINE

## 2024-10-29 PROCEDURE — G8427 DOCREV CUR MEDS BY ELIG CLIN: HCPCS | Performed by: INTERNAL MEDICINE

## 2024-10-29 PROCEDURE — 3079F DIAST BP 80-89 MM HG: CPT | Performed by: INTERNAL MEDICINE

## 2024-10-29 RX ORDER — CARVEDILOL 3.12 MG/1
3.12 TABLET ORAL 2 TIMES DAILY
Qty: 180 TABLET | Refills: 3 | Status: SHIPPED | OUTPATIENT
Start: 2024-10-29

## 2024-10-29 RX ORDER — ATORVASTATIN CALCIUM 40 MG/1
40 TABLET, FILM COATED ORAL DAILY
Qty: 90 TABLET | Refills: 3 | Status: SHIPPED | OUTPATIENT
Start: 2024-10-29

## 2024-10-29 RX ORDER — CLOPIDOGREL BISULFATE 75 MG/1
75 TABLET ORAL DAILY
Qty: 90 TABLET | Refills: 3 | Status: SHIPPED | OUTPATIENT
Start: 2024-10-29

## 2024-10-29 RX ORDER — TRAMADOL HYDROCHLORIDE 50 MG/1
50 TABLET ORAL 2 TIMES DAILY
COMMUNITY
Start: 2024-10-04

## 2024-10-29 NOTE — PATIENT INSTRUCTIONS
Plan:  Reviewed renal ultrasound findings ~ recommend follow up with Urology given mass and further evaluation   Order echocardiogram ~ coronary artery disease  Recommend following with PCP Pan Coe MD and ordering fasting lipids, liver function testing, and CBC    I recommend that the patient continue their currently prescribed medications. Their drug modifiable risk factors appear to be well controlled. I will continue to address the need/dosing of medications in future visits.   Follow up with me in 6 months

## 2024-11-08 ENCOUNTER — TELEPHONE (OUTPATIENT)
Dept: CARDIOLOGY CLINIC | Age: 59
End: 2024-11-08

## 2024-11-08 NOTE — TELEPHONE ENCOUNTER
New request for echo has been submitted.     Tracking #: [id number]     # to call with further questions: [phone]

## 2024-11-08 NOTE — TELEPHONE ENCOUNTER
Patient has been denied echo request per insurance. Please contact patient to reschedule and advise of what to do in the meantime.     Excerpt from denial letter:     Tracking #: [id number]   # to call for peer to peer: [phone]

## 2024-11-15 NOTE — TELEPHONE ENCOUNTER
Please contact pt to inform that test was originally denied, new request was sent, and that it is now pending and that will call him back next week to inform of any changes/new information.

## 2024-11-15 NOTE — TELEPHONE ENCOUNTER
Called pt at [phone], no answer, left voicemail informing pt that his test was originally denied, a new request was sent to insurance, authorization is still pending and will contact pt no later than Monday 11/18/24 before end of day to see if test has changed from pending.     If test remains in pending on 11/18/24 before EOD, pt's will need to be informed that test date will need to be cancelled and we will call pt back once auth is received.

## 2024-11-18 NOTE — TELEPHONE ENCOUNTER
New request for echo has been denied again per insurance. Please contact patient to reschedule and advise of what to do in the meantime. The following documents were submitted: most recent EKG, cath report, 2023 echo, 2023 nuc med stress test, and most recent office visit.     Excerpt from denial letter:   Reason for Denial: Your doctor’s request for a(n) Transthoracic Echocardiogram (pictures of inside your heart) cannot be approved. We made this decision based on [employer] Clinical Guideline 067 for Transthoracic Echocardiogram.    Information Relied Upon:   Based on what was given, you have a history of heart blood vessel disease (blocked arteries), your doctor’s request cannot be approved.   A person might need a(n) Transthoracic Echocardiogram if these notes have/has been given: details of extra heart sounds (murmur) on exam (physical examination); an abnormal heart rhythm on a heart monitor, or signs and complaints of a heart muscle problem. The information we got did not include these notes.     Tracking #: [id number]   # to call for peer to peer: [phone]      Thanks

## 2024-11-18 NOTE — TELEPHONE ENCOUNTER
Called pt at [phone], no answer, left detailed vm & [facility]/19/24 echo test is cancelled due to denial and that our office will call him back with further information after P's review of this denial.

## 2025-01-06 RX ORDER — ASPIRIN 81 MG/1
81 TABLET, COATED ORAL DAILY
Qty: 90 TABLET | Refills: 3 | Status: SHIPPED | OUTPATIENT
Start: 2025-01-06

## 2025-04-13 ENCOUNTER — APPOINTMENT (OUTPATIENT)
Dept: GENERAL RADIOLOGY | Age: 60
End: 2025-04-13
Payer: MEDICARE

## 2025-04-13 ENCOUNTER — HOSPITAL ENCOUNTER (EMERGENCY)
Age: 60
Discharge: HOME | End: 2025-04-14
Attending: EMERGENCY MEDICINE
Payer: MEDICARE

## 2025-04-13 ENCOUNTER — APPOINTMENT (OUTPATIENT)
Dept: CT IMAGING | Age: 60
End: 2025-04-13
Payer: MEDICARE

## 2025-04-13 DIAGNOSIS — F10.920 ACUTE ALCOHOLIC INTOXICATION WITHOUT COMPLICATION: Primary | ICD-10-CM

## 2025-04-13 LAB
ALBUMIN SERPL-MCNC: 4.1 G/DL (ref 3.4–5)
ALBUMIN/GLOB SERPL: 1.3 {RATIO} (ref 1.1–2.2)
ALP SERPL-CCNC: 81 U/L (ref 40–129)
ALT SERPL-CCNC: 52 U/L (ref 10–40)
ANION GAP SERPL CALCULATED.3IONS-SCNC: 15 MMOL/L (ref 3–16)
APAP SERPL-MCNC: <5 UG/ML (ref 10–30)
AST SERPL-CCNC: 77 U/L (ref 15–37)
BASOPHILS # BLD: 0.1 K/UL (ref 0–0.2)
BASOPHILS NFR BLD: 1 %
BILIRUB SERPL-MCNC: 0.5 MG/DL (ref 0–1)
BUN SERPL-MCNC: 19 MG/DL (ref 7–20)
CALCIUM SERPL-MCNC: 8.4 MG/DL (ref 8.3–10.6)
CHLORIDE SERPL-SCNC: 104 MMOL/L (ref 99–110)
CO2 SERPL-SCNC: 25 MMOL/L (ref 21–32)
CREAT SERPL-MCNC: 1 MG/DL (ref 0.8–1.3)
DEPRECATED RDW RBC AUTO: 15.4 % (ref 12.4–15.4)
EOSINOPHIL # BLD: 0.1 K/UL (ref 0–0.6)
EOSINOPHIL NFR BLD: 1.1 %
ETHANOLAMINE SERPL-MCNC: 283 MG/DL (ref 0–0.08)
GFR SERPLBLD CREATININE-BSD FMLA CKD-EPI: 85 ML/MIN/{1.73_M2}
GLUCOSE SERPL-MCNC: 116 MG/DL (ref 70–99)
HCT VFR BLD AUTO: 45.5 % (ref 40.5–52.5)
HGB BLD-MCNC: 15.6 G/DL (ref 13.5–17.5)
LYMPHOCYTES # BLD: 1.9 K/UL (ref 1–5.1)
LYMPHOCYTES NFR BLD: 27.8 %
MCH RBC QN AUTO: 33.7 PG (ref 26–34)
MCHC RBC AUTO-ENTMCNC: 34.3 G/DL (ref 31–36)
MCV RBC AUTO: 98.3 FL (ref 80–100)
MONOCYTES # BLD: 0.6 K/UL (ref 0–1.3)
MONOCYTES NFR BLD: 8.4 %
NEUTROPHILS # BLD: 4.1 K/UL (ref 1.7–7.7)
NEUTROPHILS NFR BLD: 61.7 %
PLATELET # BLD AUTO: 261 K/UL (ref 135–450)
PMV BLD AUTO: 6.8 FL (ref 5–10.5)
POTASSIUM SERPL-SCNC: 4.1 MMOL/L (ref 3.5–5.1)
PROT SERPL-MCNC: 7.3 G/DL (ref 6.4–8.2)
RBC # BLD AUTO: 4.63 M/UL (ref 4.2–5.9)
SALICYLATES SERPL-MCNC: <0.5 MG/DL (ref 15–30)
SODIUM SERPL-SCNC: 144 MMOL/L (ref 136–145)
WBC # BLD AUTO: 6.7 K/UL (ref 4–11)

## 2025-04-13 PROCEDURE — 80179 DRUG ASSAY SALICYLATE: CPT

## 2025-04-13 PROCEDURE — 81003 URINALYSIS AUTO W/O SCOPE: CPT

## 2025-04-13 PROCEDURE — 6360000002 HC RX W HCPCS: Performed by: PHYSICIAN ASSISTANT

## 2025-04-13 PROCEDURE — 85025 COMPLETE CBC W/AUTO DIFF WBC: CPT

## 2025-04-13 PROCEDURE — 82077 ASSAY SPEC XCP UR&BREATH IA: CPT

## 2025-04-13 PROCEDURE — 99285 EMERGENCY DEPT VISIT HI MDM: CPT

## 2025-04-13 PROCEDURE — 96372 THER/PROPH/DIAG INJ SC/IM: CPT

## 2025-04-13 PROCEDURE — 2500000003 HC RX 250 WO HCPCS: Performed by: EMERGENCY MEDICINE

## 2025-04-13 PROCEDURE — 80307 DRUG TEST PRSMV CHEM ANLYZR: CPT

## 2025-04-13 PROCEDURE — 93005 ELECTROCARDIOGRAM TRACING: CPT | Performed by: EMERGENCY MEDICINE

## 2025-04-13 PROCEDURE — 80053 COMPREHEN METABOLIC PANEL: CPT

## 2025-04-13 PROCEDURE — 2580000003 HC RX 258: Performed by: EMERGENCY MEDICINE

## 2025-04-13 PROCEDURE — 80143 DRUG ASSAY ACETAMINOPHEN: CPT

## 2025-04-13 PROCEDURE — 6360000002 HC RX W HCPCS: Performed by: EMERGENCY MEDICINE

## 2025-04-13 PROCEDURE — 71045 X-RAY EXAM CHEST 1 VIEW: CPT

## 2025-04-13 PROCEDURE — 70450 CT HEAD/BRAIN W/O DYE: CPT

## 2025-04-13 PROCEDURE — 2580000003 HC RX 258: Performed by: PHYSICIAN ASSISTANT

## 2025-04-13 PROCEDURE — 96375 TX/PRO/DX INJ NEW DRUG ADDON: CPT

## 2025-04-13 PROCEDURE — 96365 THER/PROPH/DIAG IV INF INIT: CPT

## 2025-04-13 RX ORDER — ONDANSETRON 2 MG/ML
4 INJECTION INTRAMUSCULAR; INTRAVENOUS ONCE
Status: COMPLETED | OUTPATIENT
Start: 2025-04-14 | End: 2025-04-13

## 2025-04-13 RX ORDER — DIPHENHYDRAMINE HYDROCHLORIDE 50 MG/ML
25 INJECTION, SOLUTION INTRAMUSCULAR; INTRAVENOUS ONCE
Status: COMPLETED | OUTPATIENT
Start: 2025-04-13 | End: 2025-04-13

## 2025-04-13 RX ORDER — HALOPERIDOL 5 MG/ML
2 INJECTION INTRAMUSCULAR ONCE
Status: COMPLETED | OUTPATIENT
Start: 2025-04-13 | End: 2025-04-13

## 2025-04-13 RX ADMIN — HALOPERIDOL LACTATE 2 MG: 5 INJECTION, SOLUTION INTRAMUSCULAR at 23:10

## 2025-04-13 RX ADMIN — THIAMINE HYDROCHLORIDE: 100 INJECTION, SOLUTION INTRAMUSCULAR; INTRAVENOUS at 23:53

## 2025-04-13 RX ADMIN — SODIUM CHLORIDE, PRESERVATIVE FREE 1 MG: 5 INJECTION INTRAVENOUS at 22:07

## 2025-04-13 RX ADMIN — ONDANSETRON 4 MG: 2 INJECTION, SOLUTION INTRAMUSCULAR; INTRAVENOUS at 23:58

## 2025-04-13 RX ADMIN — DIPHENHYDRAMINE HYDROCHLORIDE 25 MG: 50 INJECTION INTRAMUSCULAR; INTRAVENOUS at 22:05

## 2025-04-13 ASSESSMENT — PAIN DESCRIPTION - PAIN TYPE: TYPE: ACUTE PAIN

## 2025-04-13 ASSESSMENT — PAIN - FUNCTIONAL ASSESSMENT: PAIN_FUNCTIONAL_ASSESSMENT: 0-10

## 2025-04-13 ASSESSMENT — LIFESTYLE VARIABLES
HOW MANY STANDARD DRINKS CONTAINING ALCOHOL DO YOU HAVE ON A TYPICAL DAY: PATIENT DECLINED
HOW OFTEN DO YOU HAVE A DRINK CONTAINING ALCOHOL: 4 OR MORE TIMES A WEEK

## 2025-04-13 ASSESSMENT — PAIN DESCRIPTION - LOCATION: LOCATION: GENERALIZED

## 2025-04-14 VITALS
HEIGHT: 69 IN | SYSTOLIC BLOOD PRESSURE: 112 MMHG | HEART RATE: 118 BPM | RESPIRATION RATE: 21 BRPM | TEMPERATURE: 98.5 F | OXYGEN SATURATION: 90 % | BODY MASS INDEX: 25.92 KG/M2 | DIASTOLIC BLOOD PRESSURE: 89 MMHG | WEIGHT: 175 LBS

## 2025-04-14 LAB
AMPHETAMINES UR QL SCN>1000 NG/ML: ABNORMAL
BARBITURATES UR QL SCN>200 NG/ML: ABNORMAL
BENZODIAZ UR QL SCN>200 NG/ML: POSITIVE
BILIRUB UR QL STRIP.AUTO: NEGATIVE
CANNABINOIDS UR QL SCN>50 NG/ML: ABNORMAL
CLARITY UR: CLEAR
COCAINE UR QL SCN: ABNORMAL
COLOR UR: YELLOW
DRUG SCREEN COMMENT UR-IMP: ABNORMAL
EKG ATRIAL RATE: 117 BPM
EKG DIAGNOSIS: NORMAL
EKG P AXIS: 69 DEGREES
EKG P-R INTERVAL: 126 MS
EKG Q-T INTERVAL: 330 MS
EKG QRS DURATION: 94 MS
EKG QTC CALCULATION (BAZETT): 460 MS
EKG R AXIS: 22 DEGREES
EKG T AXIS: 10 DEGREES
EKG VENTRICULAR RATE: 117 BPM
EPI CELLS #/AREA URNS HPF: ABNORMAL /HPF (ref 0–5)
FENTANYL SCREEN, URINE: ABNORMAL
GLUCOSE UR STRIP.AUTO-MCNC: NEGATIVE MG/DL
HGB UR QL STRIP.AUTO: ABNORMAL
HYALINE CASTS #/AREA URNS LPF: ABNORMAL /LPF (ref 0–2)
KETONES UR STRIP.AUTO-MCNC: NEGATIVE MG/DL
LEUKOCYTE ESTERASE UR QL STRIP.AUTO: NEGATIVE
METHADONE UR QL SCN>300 NG/ML: ABNORMAL
MUCOUS THREADS #/AREA URNS LPF: ABNORMAL /LPF
NITRITE UR QL STRIP.AUTO: NEGATIVE
OPIATES UR QL SCN>300 NG/ML: ABNORMAL
OXYCODONE UR QL SCN: ABNORMAL
PCP UR QL SCN>25 NG/ML: ABNORMAL
PH UR STRIP.AUTO: 6 [PH] (ref 5–8)
PH UR STRIP: 6 [PH]
PROT UR STRIP.AUTO-MCNC: >=300 MG/DL
RBC #/AREA URNS HPF: ABNORMAL /HPF (ref 0–4)
SP GR UR STRIP.AUTO: 1.02 (ref 1–1.03)
UA COMPLETE W REFLEX CULTURE PNL UR: ABNORMAL
UA DIPSTICK W REFLEX MICRO PNL UR: YES
URN SPEC COLLECT METH UR: ABNORMAL
UROBILINOGEN UR STRIP-ACNC: 0.2 E.U./DL
WBC #/AREA URNS HPF: ABNORMAL /HPF (ref 0–5)

## 2025-04-14 PROCEDURE — 6360000002 HC RX W HCPCS: Performed by: EMERGENCY MEDICINE

## 2025-04-14 PROCEDURE — 2580000003 HC RX 258: Performed by: EMERGENCY MEDICINE

## 2025-04-14 PROCEDURE — 93010 ELECTROCARDIOGRAM REPORT: CPT | Performed by: INTERNAL MEDICINE

## 2025-04-14 PROCEDURE — 96366 THER/PROPH/DIAG IV INF ADDON: CPT

## 2025-04-14 PROCEDURE — 6370000000 HC RX 637 (ALT 250 FOR IP): Performed by: EMERGENCY MEDICINE

## 2025-04-14 PROCEDURE — 96376 TX/PRO/DX INJ SAME DRUG ADON: CPT

## 2025-04-14 PROCEDURE — 96375 TX/PRO/DX INJ NEW DRUG ADDON: CPT

## 2025-04-14 RX ORDER — METOCLOPRAMIDE HYDROCHLORIDE 5 MG/ML
10 INJECTION INTRAMUSCULAR; INTRAVENOUS ONCE
Status: COMPLETED | OUTPATIENT
Start: 2025-04-14 | End: 2025-04-14

## 2025-04-14 RX ADMIN — SODIUM CHLORIDE, PRESERVATIVE FREE 2 MG: 5 INJECTION INTRAVENOUS at 01:29

## 2025-04-14 RX ADMIN — LIDOCAINE HYDROCHLORIDE: 20 SOLUTION ORAL at 00:34

## 2025-04-14 RX ADMIN — METOCLOPRAMIDE 10 MG: 5 INJECTION, SOLUTION INTRAMUSCULAR; INTRAVENOUS at 00:34

## 2025-04-14 ASSESSMENT — ENCOUNTER SYMPTOMS
NAUSEA: 0
COUGH: 0
ABDOMINAL PAIN: 0
RESPIRATORY NEGATIVE: 1
VOMITING: 0
SHORTNESS OF BREATH: 0
BACK PAIN: 0
CONSTIPATION: 0
PHOTOPHOBIA: 0
COLOR CHANGE: 0
DIARRHEA: 0
CHEST TIGHTNESS: 0
ABDOMINAL DISTENTION: 0

## 2025-04-14 NOTE — ED PROVIDER NOTES
[facility] EMERGENCY DEPARTMENT  EMERGENCY DEPARTMENT ENCOUNTER        Pt Name: Arnoldo Krishnamurthy  MRN: [id number]  Birthdate 1965  Date of evaluation: 4/13/2025  Provider: JUNIOR Lovelace  PCP: Pan Coe MD  Note Started: 12:56 AM EDT 4/14/25       I have seen and evaluated this patient with my supervising physician Jimbo      CHIEF COMPLAINT       Chief Complaint   Patient presents with    Altered Mental Status     Pt's sister stated AMS fro 2 days. Pt w alcohol issues       HISTORY OF PRESENT ILLNESS: 1 or more Elements     History From: Patient  Limitations to history : None    Arnoldo Krishnamurthy is a 59 y.o. male with past medical history of CAD, COPD, hyperlipidemia, hypertension who presents ED with complaint of altered mental status.  Family brought patient to the ED for evaluation for altered mental status.  Apparently has had some change in his mental status sister reports for the past 2 days but other family member reports has been ongoing for the past 2 months.  He does admit to some frequent alcohol use.  He states he drinks bourbon.  States he did drink bourbon today.  Cannot tell me how much alcohol he drank.  Denies any drug usage.  He denies any chest pain, shortness of breath, cough, abdominal pain, nausea/vomiting, urinary symptoms or changes in bowel movements.  No falls or injuries.  No headache.  No lightheadedness or dizziness.  No visual changes or speech disturbances.  No numbness or tingling.  Family reports has not taken any of his medicines today.  They state he has been unreliable in taking his medications at home.  Brought to the ED for further evaluation and treatment    Nursing Notes were all reviewed and agreed with or any disagreements were addressed in the HPI.    REVIEW OF SYSTEMS :      Review of Systems   Constitutional:  Positive for activity change. Negative for appetite change, chills, diaphoresis, fatigue and fever.   Eyes:  Negative for

## 2025-04-14 NOTE — ED PROVIDER NOTES
Emergency Department Provider Note     Location: [facility] EMERGENCY DEPARTMENT  4/13/2025    I independently performed a history and physical on Arnoldo Krishnamurthy.   All diagnostic, treatment, and disposition decisions were made by myself in conjunction with the mid-level provider.     Arnoldo Krishnamurthy is a 59 y.o. male here for AMS. Patient has alcoholism but sister is concerned that he is not acting himself. Earlier today, patient does not know the date which is unusual for him. Sister thought he may have a UTI.  Another family member later showed up. He said he noticed slow decline in patient's function for 2 months. Patient said he drinks \"way too much bourbon\" everyday but cannot quantify for me how much he drinks. He has epigastric abdominal pain and nausea. No vomiting. Denies bloody stool.     No other complaints, modifying factors or associated symptoms.    ED Triage Vitals [04/13/25 2124]   BP Systolic BP Percentile Diastolic BP Percentile Temp Temp Source Pulse Respirations SpO2   (!) 142/58 -- -- 98.5 °F (36.9 °C) Oral (!) 112 22 93 %      Height Weight - Scale         1.753 m (5' 9\") 79.4 kg (175 lb)              Exam showed WDWN male awake, alert, yelling/screaming but staff who knows him said this is his baseline. Disheveled. No facial droop, no slurred speech, heart tachycardic but regular, lungs clear, abdomen soft, NDNT, trace edema around the ankles noted, motor grossly intact with movement of all 4 extremities.      ED course/MDM  Patient seen and examined in room [place] 4 and reassessed in room 16    EKG  The Ekg interpreted by me in the absence of a cardiologist shows.  sinus tachycardia, voiq=736     Axis is   Normal  QTc is  normal  Intervals and Durations are unremarkable.      No specific ST-T wave changes appreciated.  No evidence of acute ischemia.   No significant change from prior EKG dated 12/6/23      Radiology  CT HEAD WO CONTRAST  Result Date: 4/13/2025  EXAMINATION: CT OF THE

## 2025-04-14 NOTE — DISCHARGE INSTRUCTIONS
If you are interested in alcohol rehab, consider [facility] [phone]. For outpatient rehab, consider [facility] [phone].

## 2025-04-15 DIAGNOSIS — I25.10 CORONARY ARTERY DISEASE INVOLVING NATIVE CORONARY ARTERY OF NATIVE HEART WITHOUT ANGINA PECTORIS: ICD-10-CM

## 2025-04-15 RX ORDER — CLOPIDOGREL BISULFATE 75 MG/1
75 TABLET ORAL DAILY
Qty: 90 TABLET | Refills: 3 | Status: SHIPPED | OUTPATIENT
Start: 2025-04-15

## 2025-04-15 NOTE — TELEPHONE ENCOUNTER
Lab Results   Component Value Date    WBC 6.7 04/13/2025    HGB 15.6 04/13/2025    HCT 45.5 04/13/2025    MCV 98.3 04/13/2025     04/13/2025     Patient last seen on 10/29/24. Advised to follow up 6 months. Next appointment 5/5/25.

## 2025-04-15 NOTE — TELEPHONE ENCOUNTER
Refill    clopidogrel (PLAVIX) 75 MG tablet     [facility]/pharmacy #[id number] - [place], [place] - [address] - P [phone] - F [phone]     Lov 10/29/24  Next 5/5/2025

## 2025-04-21 NOTE — PROGRESS NOTES
[facility]   Cardiovascular Evaluation    PATIENT: Arnoldo Krishnamurthy  DATE: 2025  MRN: [id number]  CSN: [id number]  : 1965    Primary Care Doctor/Referring provider: Pan Coe MD, No admitting provider for patient encounter.     Reason for evaluation/Chief complaint:   6 Month Follow-Up, Hyperlipidemia, and Coronary Artery Disease      Subjective:    History of present illness on initial date of evaluation:   Arnoldo Krishnamurthy is a 59 y.o. male patient who presents for cardiology follow up. He has a past medical history significant for hypertension, carotid artery stenosis, hyperlipidemia, tobacco abuse, and AAA. He was initially referred by  for cardiac risk assessment for abdominal aorta aneurysm open repair. The patient had a abdominal CTA measuring 5.1 cm on 23. He had a Lexiscan stress myoview 23 fixed defect consistent with scar/prior infarction, EF 46%.    An echocardiogram 23 EF 55%, mild LVH. Left heart catheterization 10/18/23 with PCI to RCA with TAWANDA x 2. He was admitted -23 for abdominal aortic aneurysm repair with .   At last office visit 10/29/24 and echo was recommended this was not obtained. Today he reports he is doing well. He has not followed with vascular team since AAA repair.       Patient Active Problem List   Diagnosis    DDD (degenerative disc disease), cervical    Displacement of cervical intervertebral disc without myelopathy    Aneurysm of infrarenal abdominal aorta    Anxiety disorder    Benign essential hypertension    Benign prostatic hyperplasia with lower urinary tract symptoms    Carotid artery stenosis    Chronic obstructive pulmonary disease (HCC)    Current smoker    Degeneration of lumbar or lumbosacral intervertebral disc    Depression    Diverticular disease    Esophageal reflux    Male erectile disorder    Neck sprain    Nicotine dependence    Other and unspecified hyperlipidemia

## 2025-05-05 ENCOUNTER — OFFICE VISIT (OUTPATIENT)
Dept: CARDIOLOGY CLINIC | Age: 60
End: 2025-05-05
Payer: MEDICARE

## 2025-05-05 VITALS
HEART RATE: 86 BPM | BODY MASS INDEX: 28.08 KG/M2 | HEIGHT: 69 IN | DIASTOLIC BLOOD PRESSURE: 82 MMHG | OXYGEN SATURATION: 98 % | WEIGHT: 189.6 LBS | SYSTOLIC BLOOD PRESSURE: 128 MMHG

## 2025-05-05 DIAGNOSIS — Z98.890 STATUS POST ABDOMINAL AORTIC ANEURYSM (AAA) REPAIR: ICD-10-CM

## 2025-05-05 DIAGNOSIS — Z95.5 HISTORY OF CORONARY ARTERY STENT PLACEMENT: ICD-10-CM

## 2025-05-05 DIAGNOSIS — Z72.0 TOBACCO USE: ICD-10-CM

## 2025-05-05 DIAGNOSIS — Z86.79 STATUS POST ABDOMINAL AORTIC ANEURYSM (AAA) REPAIR: ICD-10-CM

## 2025-05-05 DIAGNOSIS — I25.10 CORONARY ARTERY DISEASE INVOLVING NATIVE CORONARY ARTERY OF NATIVE HEART WITHOUT ANGINA PECTORIS: ICD-10-CM

## 2025-05-05 DIAGNOSIS — E78.2 MIXED HYPERLIPIDEMIA: Primary | ICD-10-CM

## 2025-05-05 PROCEDURE — 4004F PT TOBACCO SCREEN RCVD TLK: CPT | Performed by: INTERNAL MEDICINE

## 2025-05-05 PROCEDURE — 3074F SYST BP LT 130 MM HG: CPT | Performed by: INTERNAL MEDICINE

## 2025-05-05 PROCEDURE — G2211 COMPLEX E/M VISIT ADD ON: HCPCS | Performed by: INTERNAL MEDICINE

## 2025-05-05 PROCEDURE — G8427 DOCREV CUR MEDS BY ELIG CLIN: HCPCS | Performed by: INTERNAL MEDICINE

## 2025-05-05 PROCEDURE — 3017F COLORECTAL CA SCREEN DOC REV: CPT | Performed by: INTERNAL MEDICINE

## 2025-05-05 PROCEDURE — G8417 CALC BMI ABV UP PARAM F/U: HCPCS | Performed by: INTERNAL MEDICINE

## 2025-05-05 PROCEDURE — 99214 OFFICE O/P EST MOD 30 MIN: CPT | Performed by: INTERNAL MEDICINE

## 2025-05-05 PROCEDURE — 3079F DIAST BP 80-89 MM HG: CPT | Performed by: INTERNAL MEDICINE

## 2025-05-05 NOTE — PATIENT INSTRUCTIONS
Your provider has ordered testing for further evaluation.  An order/prescription has been included in your paper work.   To schedule outpatient testing, contact Central Scheduling by calling [facility] ([phone]).       Plan:  Proceed with echocardiogram as ordered ~ coronary artery disease with history of stent placement  Order abdominal ultrasound ~ history of AAA repair  I recommend that the patient continue their currently prescribed medications. Their drug modifiable risk factors appear to be well controlled. I will continue to address the need/dosing of medications in future visits.   Discussed upon results discontinuing clopidogrel (Plavix) therapy ~ await results   Please bring copy of labs to next office visit for review from PCP Pan Coe MD   Smoking Cessation encouraged  ~    Advised patient to quit and offered support.  Educational material provided to patient.  Follow up in 1 year

## (undated) DEVICE — TUBING, SUCTION, 3/16" X 6', STRAIGHT: Brand: MEDLINE

## (undated) DEVICE — RETAINER VISCERA GLASSMAN FISH DISP ST

## (undated) DEVICE — Y-TYPE TUR/BLADDER IRRIGATION SET, REGULATING CLAMP

## (undated) DEVICE — SUTURE ABSORBABLE MONOFILAMENT 0 CTX 60 IN VIO PDS + PDP990G

## (undated) DEVICE — CANNULA,OXY,ADULT,SUPERSOFT,W/7'TUB,SC: Brand: MEDLINE INDUSTRIES, INC.

## (undated) DEVICE — SOLUTION IRRIG 1000ML 0.9% SOD CHL USP POUR PLAS BTL

## (undated) DEVICE — SUTURE PROL SZ 3-0 L48IN NONABSORBABLE BLU SH L26MM 1/2 CIR 8534H

## (undated) DEVICE — SPONGE LAP W18XL18IN WHT COT 4 PLY FLD STRUNG RADPQ DISP ST 2 PER PACK

## (undated) DEVICE — APPLIER LIG CLP M L11IN TI STR RNG HNDL FOR 20 CLP DISP

## (undated) DEVICE — FOGARTY - HYDRAGRIP SURGICAL - CLAMP INSERTS: Brand: FOGARTY SOFTJAW

## (undated) DEVICE — SUTURE VCRL + SZ 3-0 L27IN ABSRB UD L26MM SH 1/2 CIR VCP416H

## (undated) DEVICE — DRESSING FOAM W4XL10IN AG SIL ADH ANTIMIC POSTOP OPTIFOAM

## (undated) DEVICE — COVER,TABLE,HEAVY DUTY,50"X90",STRL: Brand: MEDLINE

## (undated) DEVICE — SINGLE USE SUCTION VALVE MAJ-209: Brand: SINGLE USE SUCTION VALVE (STERILE)

## (undated) DEVICE — SIZER GRAFT VASCULAR VASCUTEK DISP

## (undated) DEVICE — FRAME EYEWR PROTCT ASST CLR DISP SAFEVIEW

## (undated) DEVICE — FOGARTY OCCLUSION CATHETER 5F 40CM: Brand: FOGARTY

## (undated) DEVICE — CATHETER THOR 28FR SIL 6 EYE STR HI TEAR STRENGTH

## (undated) DEVICE — ELECTRODE BLDE L4IN NONINSULATED EDGE

## (undated) DEVICE — 3M™ IOBAN™ 2 ANTIMICROBIAL INCISE DRAPE 6651EZ: Brand: IOBAN™ 2

## (undated) DEVICE — YANKAUER,BULB TIP,W/O VENT,RIGID,STERILE: Brand: MEDLINE

## (undated) DEVICE — SUTURE PERMA-HAND SZ 0 L18IN NONABSORBABLE BLK L30MM FSL 678G

## (undated) DEVICE — Device

## (undated) DEVICE — PLEDGET SURG W0.375XL0.062IN THK1.5MM WHT SFT PTFE RECT

## (undated) DEVICE — SUTURE NONABSORBABLE MONOFILAMENT 6-0 C-1 1X30 IN PROLENE 8706H

## (undated) DEVICE — LINER SUCTION

## (undated) DEVICE — SUTURE PROL SZ 2-0 L42IN NONABSORBABLE BLU CT-1 L36MM 3/8 D9693

## (undated) DEVICE — CAUTERY ES FN TIP 2IN FT2 HI TEMP FNGR TIP CTRL SFTY CAP AA

## (undated) DEVICE — DRAIN SURG SGL COLL PT TB FOR ATS BG OASIS

## (undated) DEVICE — FOGARTY OCCLUSION CATHETER 4F 40CM: Brand: FOGARTY

## (undated) DEVICE — AGENT HEMSTAT W4XL8IN OXIDIZED REGENERATED CELOS ABSRB

## (undated) DEVICE — STAPLER EXT SKIN 35 WIDE S STL STPL SQUEEZE HNDL VISISTAT

## (undated) DEVICE — TTL1LYR 16FR10ML 100%SIL TMPST TR: Brand: MEDLINE

## (undated) DEVICE — GOWN,REINFORCED,POLY,AURORA,XLARGE,STRL: Brand: MEDLINE

## (undated) DEVICE — SYRINGE IRRIG 60ML SFT PLIABLE BLB EZ TO GRP 1 HND USE W/

## (undated) DEVICE — CONMED SCOPE SAVER BITE BLOCK, 20X27 MM: Brand: SCOPE SAVER

## (undated) DEVICE — TOWEL,OR,DSP,ST,BLUE,STD,6/PK,12PK/CS: Brand: MEDLINE

## (undated) DEVICE — ELECTRODE,RADIOTRANSLUCENT,FOAM,3PK: Brand: MEDLINE

## (undated) DEVICE — SUTURE NONABSORBABLE MONOFILAMENT 4-0 RB-1 36 IN BLU PROLENE 8557H

## (undated) DEVICE — SYRINGE MED 50ML LUERSLIP TIP

## (undated) DEVICE — LINER,SOFT,SUCTION CANISTER,1500CC: Brand: MEDLINE

## (undated) DEVICE — SYRINGE MARK SCALE W/ LL TIP 3ML 200 S/C

## (undated) DEVICE — BOWL MED M 16OZ PLAS CAP GRAD

## (undated) DEVICE — BLOOD TRANSFUSION FILTER: Brand: HAEMONETICS

## (undated) DEVICE — SOLUTION IRRIG 2000ML 0.9% SOD CHL USP UROMATIC PLAS CONT

## (undated) DEVICE — SINGLE USE BIOPSY VALVE MAJ-210: Brand: SINGLE USE BIOPSY VALVE (STERILE)

## (undated) DEVICE — GLOVE SURG SZ 8 L11.77IN FNGR THK9.8MIL STRW LTX POLYMER

## (undated) DEVICE — TUBING, SUCTION, 3/16" X 12', STRAIGHT: Brand: MEDLINE

## (undated) DEVICE — DRAPE,LAP,CHOLE,W/TROUGHS,STERILE: Brand: MEDLINE

## (undated) DEVICE — TRAP,MUCUS SPECIMEN, 80CC: Brand: MEDLINE

## (undated) DEVICE — GOWN SIRUS NONREIN XL W/TWL: Brand: MEDLINE INDUSTRIES, INC.

## (undated) DEVICE — LINE GAS SAMPLING VENT

## (undated) DEVICE — SUTURE VCRL + SZ 2-0 L36IN ABSRB UD L36MM CT-1 1/2 CIR VCP945H

## (undated) DEVICE — SYRINGE MED 10ML SLIP TIP BLNT FILL AND LUERLOCK DISP